# Patient Record
Sex: FEMALE | Race: WHITE | HISPANIC OR LATINO
[De-identification: names, ages, dates, MRNs, and addresses within clinical notes are randomized per-mention and may not be internally consistent; named-entity substitution may affect disease eponyms.]

---

## 2017-09-19 ENCOUNTER — APPOINTMENT (OUTPATIENT)
Dept: BARIATRICS | Facility: CLINIC | Age: 62
End: 2017-09-19
Payer: COMMERCIAL

## 2017-09-19 ENCOUNTER — APPOINTMENT (OUTPATIENT)
Dept: BARIATRICS/WEIGHT MGMT | Facility: CLINIC | Age: 62
End: 2017-09-19
Payer: COMMERCIAL

## 2017-09-19 ENCOUNTER — OUTPATIENT (OUTPATIENT)
Dept: OUTPATIENT SERVICES | Facility: HOSPITAL | Age: 62
LOS: 1 days | End: 2017-09-19
Payer: COMMERCIAL

## 2017-09-19 VITALS
SYSTOLIC BLOOD PRESSURE: 110 MMHG | HEIGHT: 64 IN | WEIGHT: 277 LBS | DIASTOLIC BLOOD PRESSURE: 60 MMHG | BODY MASS INDEX: 47.29 KG/M2

## 2017-09-19 VITALS — BODY MASS INDEX: 47.29 KG/M2 | WEIGHT: 277 LBS | HEIGHT: 64 IN

## 2017-09-19 DIAGNOSIS — Z98.84 BARIATRIC SURGERY STATUS: ICD-10-CM

## 2017-09-19 DIAGNOSIS — R13.10 DYSPHAGIA, UNSPECIFIED: ICD-10-CM

## 2017-09-19 DIAGNOSIS — E66.9 OBESITY, UNSPECIFIED: ICD-10-CM

## 2017-09-19 DIAGNOSIS — M25.562 PAIN IN RIGHT KNEE: ICD-10-CM

## 2017-09-19 DIAGNOSIS — M25.561 PAIN IN RIGHT KNEE: ICD-10-CM

## 2017-09-19 PROCEDURE — 74220 X-RAY XM ESOPHAGUS 1CNTRST: CPT

## 2017-09-19 PROCEDURE — 74220 X-RAY XM ESOPHAGUS 1CNTRST: CPT | Mod: 26

## 2017-09-19 PROCEDURE — 99214 OFFICE O/P EST MOD 30 MIN: CPT

## 2017-09-19 PROCEDURE — 97802 MEDICAL NUTRITION INDIV IN: CPT

## 2017-10-31 ENCOUNTER — APPOINTMENT (OUTPATIENT)
Dept: BARIATRICS/WEIGHT MGMT | Facility: CLINIC | Age: 62
End: 2017-10-31
Payer: COMMERCIAL

## 2017-10-31 VITALS — HEIGHT: 64 IN | WEIGHT: 270 LBS | BODY MASS INDEX: 46.1 KG/M2

## 2017-10-31 PROCEDURE — 97803 MED NUTRITION INDIV SUBSEQ: CPT

## 2018-01-25 ENCOUNTER — APPOINTMENT (OUTPATIENT)
Dept: BARIATRICS/WEIGHT MGMT | Facility: CLINIC | Age: 63
End: 2018-01-25
Payer: COMMERCIAL

## 2018-01-25 DIAGNOSIS — Z98.84 BARIATRIC SURGERY STATUS: ICD-10-CM

## 2018-01-25 PROCEDURE — 97803 MED NUTRITION INDIV SUBSEQ: CPT

## 2018-01-29 VITALS — HEIGHT: 64 IN | WEIGHT: 270 LBS | BODY MASS INDEX: 46.1 KG/M2

## 2018-03-22 ENCOUNTER — APPOINTMENT (OUTPATIENT)
Dept: BARIATRICS/WEIGHT MGMT | Facility: CLINIC | Age: 63
End: 2018-03-22

## 2020-05-27 ENCOUNTER — EMERGENCY (EMERGENCY)
Facility: HOSPITAL | Age: 65
LOS: 0 days | Discharge: HOME | End: 2020-05-27
Attending: EMERGENCY MEDICINE
Payer: COMMERCIAL

## 2020-05-27 VITALS
HEART RATE: 95 BPM | OXYGEN SATURATION: 98 % | DIASTOLIC BLOOD PRESSURE: 93 MMHG | SYSTOLIC BLOOD PRESSURE: 155 MMHG | TEMPERATURE: 99 F | RESPIRATION RATE: 18 BRPM

## 2020-05-27 VITALS — HEIGHT: 64 IN | WEIGHT: 283.96 LBS

## 2020-05-27 DIAGNOSIS — E66.9 OBESITY, UNSPECIFIED: ICD-10-CM

## 2020-05-27 DIAGNOSIS — I10 ESSENTIAL (PRIMARY) HYPERTENSION: ICD-10-CM

## 2020-05-27 DIAGNOSIS — I73.9 PERIPHERAL VASCULAR DISEASE, UNSPECIFIED: ICD-10-CM

## 2020-05-27 DIAGNOSIS — L53.9 ERYTHEMATOUS CONDITION, UNSPECIFIED: ICD-10-CM

## 2020-05-27 DIAGNOSIS — Z98.890 OTHER SPECIFIED POSTPROCEDURAL STATES: ICD-10-CM

## 2020-05-27 DIAGNOSIS — R60.0 LOCALIZED EDEMA: ICD-10-CM

## 2020-05-27 DIAGNOSIS — Z88.6 ALLERGY STATUS TO ANALGESIC AGENT: ICD-10-CM

## 2020-05-27 DIAGNOSIS — J45.909 UNSPECIFIED ASTHMA, UNCOMPLICATED: ICD-10-CM

## 2020-05-27 DIAGNOSIS — M79.661 PAIN IN RIGHT LOWER LEG: ICD-10-CM

## 2020-05-27 DIAGNOSIS — Z90.49 ACQUIRED ABSENCE OF OTHER SPECIFIED PARTS OF DIGESTIVE TRACT: ICD-10-CM

## 2020-05-27 DIAGNOSIS — L03.115 CELLULITIS OF RIGHT LOWER LIMB: ICD-10-CM

## 2020-05-27 DIAGNOSIS — G47.33 OBSTRUCTIVE SLEEP APNEA (ADULT) (PEDIATRIC): ICD-10-CM

## 2020-05-27 LAB
ANION GAP SERPL CALC-SCNC: 3 MMOL/L — LOW (ref 5–17)
APTT BLD: 35.7 SEC — SIGNIFICANT CHANGE UP (ref 27.5–36.3)
BASOPHILS # BLD AUTO: 0.06 K/UL — SIGNIFICANT CHANGE UP (ref 0–0.2)
BASOPHILS NFR BLD AUTO: 0.6 % — SIGNIFICANT CHANGE UP (ref 0–2)
BUN SERPL-MCNC: 10 MG/DL — SIGNIFICANT CHANGE UP (ref 7–23)
CALCIUM SERPL-MCNC: 9.3 MG/DL — SIGNIFICANT CHANGE UP (ref 8.5–10.1)
CHLORIDE SERPL-SCNC: 108 MMOL/L — SIGNIFICANT CHANGE UP (ref 96–108)
CO2 SERPL-SCNC: 29 MMOL/L — SIGNIFICANT CHANGE UP (ref 22–31)
CREAT SERPL-MCNC: 0.57 MG/DL — SIGNIFICANT CHANGE UP (ref 0.5–1.3)
EOSINOPHIL # BLD AUTO: 0.16 K/UL — SIGNIFICANT CHANGE UP (ref 0–0.5)
EOSINOPHIL NFR BLD AUTO: 1.6 % — SIGNIFICANT CHANGE UP (ref 0–6)
GLUCOSE SERPL-MCNC: 89 MG/DL — SIGNIFICANT CHANGE UP (ref 70–99)
HCT VFR BLD CALC: 42.2 % — SIGNIFICANT CHANGE UP (ref 34.5–45)
HGB BLD-MCNC: 13.6 G/DL — SIGNIFICANT CHANGE UP (ref 11.5–15.5)
IMM GRANULOCYTES NFR BLD AUTO: 0.3 % — SIGNIFICANT CHANGE UP (ref 0–1.5)
INR BLD: 1.01 RATIO — SIGNIFICANT CHANGE UP (ref 0.88–1.16)
LYMPHOCYTES # BLD AUTO: 2.58 K/UL — SIGNIFICANT CHANGE UP (ref 1–3.3)
LYMPHOCYTES # BLD AUTO: 25.3 % — SIGNIFICANT CHANGE UP (ref 13–44)
MCHC RBC-ENTMCNC: 30 PG — SIGNIFICANT CHANGE UP (ref 27–34)
MCHC RBC-ENTMCNC: 32.2 GM/DL — SIGNIFICANT CHANGE UP (ref 32–36)
MCV RBC AUTO: 93.2 FL — SIGNIFICANT CHANGE UP (ref 80–100)
MONOCYTES # BLD AUTO: 0.97 K/UL — HIGH (ref 0–0.9)
MONOCYTES NFR BLD AUTO: 9.5 % — SIGNIFICANT CHANGE UP (ref 2–14)
NEUTROPHILS # BLD AUTO: 6.4 K/UL — SIGNIFICANT CHANGE UP (ref 1.8–7.4)
NEUTROPHILS NFR BLD AUTO: 62.7 % — SIGNIFICANT CHANGE UP (ref 43–77)
PLATELET # BLD AUTO: 290 K/UL — SIGNIFICANT CHANGE UP (ref 150–400)
POTASSIUM SERPL-MCNC: 4.1 MMOL/L — SIGNIFICANT CHANGE UP (ref 3.5–5.3)
POTASSIUM SERPL-SCNC: 4.1 MMOL/L — SIGNIFICANT CHANGE UP (ref 3.5–5.3)
PROTHROM AB SERPL-ACNC: 11.2 SEC — SIGNIFICANT CHANGE UP (ref 10–12.9)
RBC # BLD: 4.53 M/UL — SIGNIFICANT CHANGE UP (ref 3.8–5.2)
RBC # FLD: 13.6 % — SIGNIFICANT CHANGE UP (ref 10.3–14.5)
SODIUM SERPL-SCNC: 140 MMOL/L — SIGNIFICANT CHANGE UP (ref 135–145)
WBC # BLD: 10.2 K/UL — SIGNIFICANT CHANGE UP (ref 3.8–10.5)
WBC # FLD AUTO: 10.2 K/UL — SIGNIFICANT CHANGE UP (ref 3.8–10.5)

## 2020-05-27 PROCEDURE — 36415 COLL VENOUS BLD VENIPUNCTURE: CPT

## 2020-05-27 PROCEDURE — 85730 THROMBOPLASTIN TIME PARTIAL: CPT

## 2020-05-27 PROCEDURE — 93970 EXTREMITY STUDY: CPT

## 2020-05-27 PROCEDURE — 99284 EMERGENCY DEPT VISIT MOD MDM: CPT | Mod: 25

## 2020-05-27 PROCEDURE — 85610 PROTHROMBIN TIME: CPT

## 2020-05-27 PROCEDURE — 85025 COMPLETE CBC W/AUTO DIFF WBC: CPT

## 2020-05-27 PROCEDURE — 80048 BASIC METABOLIC PNL TOTAL CA: CPT

## 2020-05-27 PROCEDURE — 93970 EXTREMITY STUDY: CPT | Mod: 26

## 2020-05-27 PROCEDURE — 99284 EMERGENCY DEPT VISIT MOD MDM: CPT

## 2020-05-27 RX ORDER — CEPHALEXIN 500 MG
1 CAPSULE ORAL
Qty: 40 | Refills: 0
Start: 2020-05-27 | End: 2020-06-05

## 2020-05-27 NOTE — ED STATDOCS - CLINICAL SUMMARY MEDICAL DECISION MAKING FREE TEXT BOX
66 y/o female with a PMHx of JOSE, HTN,  asthma, presents to the ED for right leg swelling, redness, and pain. Exam with area of erythema to right posterior leg, +mild TTP, no fluctuance, no crepitus, no streaking, compartments soft. Will obtain US to r/o DVT. If negative will teat for cellulitis. No signs of necrotizing cellulitis.

## 2020-05-27 NOTE — ED STATDOCS - OBJECTIVE STATEMENT
66 y/o female with a PMHx of JOSE, HTN, asthma, presents to the ED for right leg swelling, redness, and pain. Pt states she had these types of symptoms in the past and treated by PMD and it improves. Pt seen by PMD today and sent in to r/o DVT. Denies CP, SOB, fevers, spreading redness. No other complaints at this time. Allergies: Aspirin.

## 2020-05-27 NOTE — ED STATDOCS - PATIENT PORTAL LINK FT
You can access the FollowMyHealth Patient Portal offered by [facility] by registering at the following website: [email or web address]. By joining [employer]’s FollowMyHealth portal, you will also be able to view your health information using other applications (apps) compatible with our system.

## 2020-05-27 NOTE — ED STATDOCS - PMH
Asthma    Back pain    Circulation problem  discoloration bilateral lower legs   peripheral vascular disease  Obesity    Sleep apnea    Umbilical hernia  resolved 5/2012

## 2020-05-27 NOTE — ED ADULT TRIAGE NOTE - CHIEF COMPLAINT QUOTE
Pt. to the ED sent by PCP C/O Bilateral Leg Swelling and Inflammation ( R/O Cellulitis)- Hx. of HTN and Asthma- Pt. denies Fevers/Respiratory Symptoms and or Sick Contacts

## 2020-05-27 NOTE — ED STATDOCS - PROGRESS NOTE DETAILS
66 y/o F with PMh of venous insufficiency, JOSE, HTN, asthma presents with right LE pain & swelling. Evaluated by PCP today, advised to come to ED for evaluate for DVT vs. cellulitis. As per PCP, patient has had multiple instances of cellulitis over the past year, managed with PO antibiotics. Pt states symptoms are now worse than prior instances. Denies trauma, fever, chills, vomiting, CP, SOb, palpitations. PE: Well appearing, Obese. Cardiac: s1s2, RRR> Lungs: CTAB. Abdomen: NBS x4, soft, nontender. Skin: +erythema posterior right thigh which is TTP. No fluctuance, crepitus. MSK: Full ROM in LE joints, patient ambulatory. A/P; Concern for DVT vs. cellulitis. Plan for labs, US, reassess. - Giuliano Andino PA-C Labs and imaging unremarkable. Plan for dc home with keflex. PCP follow up advised. - Giuliano Andino PA-C

## 2020-05-27 NOTE — ED STATDOCS - NS ED ROS FT
Constitutional: No fever or chills  Eyes: No visual changes  HEENT: No throat pain  CV: No chest pain  Resp: No SOB no cough  GI: No abd pain, nausea or vomiting  : No dysuria  MSK: +right leg swelling, redness, and pain  Skin: No rash  Neuro: No headache

## 2020-05-27 NOTE — ED STATDOCS - NSFOLLOWUPINSTRUCTIONS_ED_ALL_ED_FT
TAKE ANTIBIOTICS TO COMPLETION. FOLLOW UP WITH PCP. RETURN TO ED IF SYMPTOMS WORSEN.     Cellulitis    WHAT YOU NEED TO KNOW:    Cellulitis is a skin infection caused by bacteria. Cellulitis may go away on its own or you may need treatment. Your healthcare provider may draw a [place] around the outside edges of your cellulitis. If your cellulitis spreads, your healthcare provider will see it outside of the [place]. Cellulitis          DISCHARGE INSTRUCTIONS:    Call 911 if:     You have sudden trouble breathing or chest pain.        Return to the emergency department if:     Your wound gets larger and more painful.       You feel a crackling under your skin when you touch it.      You have purple dots or bumps on your skin, or you see bleeding under your skin.      You have new swelling and pain in your legs.      The red, warm, swollen area gets larger.      You see red streaks coming from the infected area.    Contact your healthcare provider if:     You have a fever.      Your fever or pain does not go away or gets worse.      The area does not get smaller after 2 days of antibiotics.      Your skin is flaking or peeling off.      You have questions or concerns about your condition or care.    Medicines:     Antibiotics help treat the bacterial infection.       NSAIDs, such as ibuprofen, help decrease swelling, pain, and fever. NSAIDs can cause stomach bleeding or kidney problems in certain people. If you take blood thinner medicine, always ask if NSAIDs are safe for you. Always read the medicine label and follow directions. Do not give these medicines to children under 6 months of age without direction from your child's healthcare provider.      Acetaminophen decreases pain and fever. It is available without a doctor's order. Ask how much to take and how often to take it. Follow directions. Read the labels of all other medicines you are using to see if they also contain acetaminophen, or ask your doctor or pharmacist. Acetaminophen can cause liver damage if not taken correctly. Do not use more than 4 grams (4,000 milligrams) total of acetaminophen in one day.       Take your medicine as directed. Contact your healthcare provider if you think your medicine is not helping or if you have side effects. Tell him or her if you are allergic to any medicine. Keep a list of the medicines, vitamins, and herbs you take. Include the amounts, and when and why you take them. Bring the list or the pill bottles to follow-up visits. Carry your medicine list with you in case of an emergency.    Self-care:     Elevate the area above the level of your heart as often as you can. This will help decrease swelling and pain. Prop the area on pillows or blankets to keep it elevated comfortably.       Clean the area daily until the wound scabs over. Gently wash the area with soap and water. Pat dry. Use dressings as directed.       Place cool or warm, wet cloths on the area as directed. Use clean cloths and clean water. Leave it on the area until the cloth is room temperature. Pat the area dry with a clean, dry cloth. The cloths may help decrease pain.     Prevent cellulitis:     Do not scratch bug bites or areas of injury. You increase your risk for cellulitis by scratching these areas.       Do not share personal items, such as towels, clothing, and razors.       Clean exercise equipment with germ-killing  before and after you use it.      Wash your hands often. Use soap and water. Wash your hands after you use the bathroom, change a child's diapers, or sneeze. Wash your hands before you prepare or eat food. Use lotion to prevent dry, cracked skin. Handwashing           Wear pressure stockings as directed. You may be told to wear the stockings if you have peripheral edema. The stockings improve blood flow and decrease swelling.      Treat athlete’s foot. This can help prevent the spread of a bacterial skin infection.    Follow up with your healthcare provider within 3 days, or as directed: Your healthcare provider will check if your cellulitis is getting better. You may need different medicine. Write down your questions so you remember to ask them during your visits.

## 2020-05-27 NOTE — ED STATDOCS - PHYSICAL EXAMINATION
Constitutional: NAD AAOx3  Eyes: PERRLA EOMI  Head: Normocephalic atraumatic  Mouth: MMM  Cardiac: regular rate   Resp: Lungs CTAB  GI: Abd s/nt/nd  Neuro: CN2-12 intact  Skin: +area of erythema to right posterior leg, +mild TTP, no fluctuance, no crepitus, no streaking, compartments soft.

## 2020-05-27 NOTE — ED STATDOCS - NS_ ATTENDINGSCRIBEDETAILS _ED_A_ED_FT
I, Steven Ramirez MD,  performed the initial face to face bedside interview with this patient regarding history of present illness, review of symptoms and relevant past medical, social and family history.  I completed an independent physical examination.  I was the initial provider who evaluated this patient.  The history, relevant review of systems, past medical and surgical history, medical decision making, and physical examination was documented by the scribe in my presence and I attest to the accuracy of the documentation.

## 2020-05-27 NOTE — ED STATDOCS - PSH
Hernia  umbilical hernia repair 2012  S/P appendectomy  teenager  S/P   ,  S/P tonsillectomy  childhood  Vein symptom  right leg laser vein cleanse and bypass

## 2020-09-29 ENCOUNTER — RESULT REVIEW (OUTPATIENT)
Age: 65
End: 2020-09-29

## 2020-12-23 ENCOUNTER — OUTPATIENT (OUTPATIENT)
Dept: OUTPATIENT SERVICES | Facility: HOSPITAL | Age: 65
LOS: 1 days | End: 2020-12-23
Payer: COMMERCIAL

## 2020-12-23 DIAGNOSIS — Z20.828 CONTACT WITH AND (SUSPECTED) EXPOSURE TO OTHER VIRAL COMMUNICABLE DISEASES: ICD-10-CM

## 2020-12-23 LAB — SARS-COV-2 RNA SPEC QL NAA+PROBE: SIGNIFICANT CHANGE UP

## 2020-12-23 PROCEDURE — C9803: CPT

## 2020-12-23 PROCEDURE — U0003: CPT

## 2020-12-24 DIAGNOSIS — Z20.828 CONTACT WITH AND (SUSPECTED) EXPOSURE TO OTHER VIRAL COMMUNICABLE DISEASES: ICD-10-CM

## 2021-06-25 NOTE — ED ADULT NURSE NOTE - CCCP TRG CHIEF CMPLNT
Miguel Angel would like a call regarding Samantha. He states he is going back on the medication. He states pharmacy does not have it listed in the system. He states he will be admitting to the nursing home on Monday 6/28/2021. Patient is aware that  is out of the office today.   lower leg pain/injury

## 2021-11-03 ENCOUNTER — APPOINTMENT (OUTPATIENT)
Dept: FAMILY MEDICINE | Facility: CLINIC | Age: 66
End: 2021-11-03
Payer: COMMERCIAL

## 2021-11-03 ENCOUNTER — NON-APPOINTMENT (OUTPATIENT)
Age: 66
End: 2021-11-03

## 2021-11-03 VITALS
DIASTOLIC BLOOD PRESSURE: 81 MMHG | HEIGHT: 64 IN | BODY MASS INDEX: 48.49 KG/M2 | SYSTOLIC BLOOD PRESSURE: 147 MMHG | WEIGHT: 284 LBS | TEMPERATURE: 98.4 F | HEART RATE: 99 BPM | OXYGEN SATURATION: 98 %

## 2021-11-03 DIAGNOSIS — Z87.891 PERSONAL HISTORY OF NICOTINE DEPENDENCE: ICD-10-CM

## 2021-11-03 DIAGNOSIS — Z78.9 OTHER SPECIFIED HEALTH STATUS: ICD-10-CM

## 2021-11-03 DIAGNOSIS — Z82.49 FAMILY HISTORY OF ISCHEMIC HEART DISEASE AND OTHER DISEASES OF THE CIRCULATORY SYSTEM: ICD-10-CM

## 2021-11-03 DIAGNOSIS — Z23 ENCOUNTER FOR IMMUNIZATION: ICD-10-CM

## 2021-11-03 DIAGNOSIS — Z01.419 ENCOUNTER FOR GYNECOLOGICAL EXAMINATION (GENERAL) (ROUTINE) W/OUT ABNORMAL FINDINGS: ICD-10-CM

## 2021-11-03 DIAGNOSIS — B37.2 CANDIDIASIS OF SKIN AND NAIL: ICD-10-CM

## 2021-11-03 DIAGNOSIS — G47.33 OBSTRUCTIVE SLEEP APNEA (ADULT) (PEDIATRIC): ICD-10-CM

## 2021-11-03 PROCEDURE — G0008: CPT

## 2021-11-03 PROCEDURE — 90662 IIV NO PRSV INCREASED AG IM: CPT

## 2021-11-03 PROCEDURE — 99205 OFFICE O/P NEW HI 60 MIN: CPT | Mod: 25

## 2021-11-03 RX ORDER — KETOCONAZOLE 20 MG/G
2 CREAM TOPICAL TWICE DAILY
Qty: 1 | Refills: 2 | Status: ACTIVE | COMMUNITY
Start: 2021-11-03 | End: 1900-01-01

## 2021-11-03 RX ORDER — HYDROCHLOROTHIAZIDE 25 MG/1
25 TABLET ORAL
Refills: 0 | Status: ACTIVE | COMMUNITY

## 2021-11-03 RX ORDER — MELOXICAM 15 MG/1
15 TABLET ORAL
Refills: 0 | Status: ACTIVE | COMMUNITY

## 2021-11-03 RX ORDER — LOSARTAN POTASSIUM 100 MG/1
TABLET, FILM COATED ORAL
Refills: 0 | Status: DISCONTINUED | COMMUNITY
End: 2021-11-03

## 2021-11-03 NOTE — PHYSICAL EXAM
[Normal Appearance] : normal in appearance [No Masses] : no palpable masses [No Nipple Discharge] : no nipple discharge [No Axillary Lymphadenopathy] : no axillary lymphadenopathy [Soft] : abdomen soft [Non Tender] : non-tender [No HSM] : no HSM [Urethral Meatus] : normal urethra [Urinary Bladder Findings] : the bladder was normal on palpation [External Female Genitalia] : normal external genitalia [Vagina] : normal vaginal exam [Cervix] : normal cervix [Uterus] : uterus was normal size, without masses or tenderness [Uterine Adnexae] : normal adnexa [FreeTextEntry1] : Pt obese, making bimanual exam very difficult [de-identified] : macular intertriginous rash

## 2021-12-29 ENCOUNTER — APPOINTMENT (OUTPATIENT)
Dept: FAMILY MEDICINE | Facility: CLINIC | Age: 66
End: 2021-12-29

## 2022-01-01 ENCOUNTER — INPATIENT (INPATIENT)
Facility: HOSPITAL | Age: 67
LOS: 2 days | Discharge: HOME | DRG: 603 | End: 2022-01-04
Attending: FAMILY MEDICINE | Admitting: INTERNAL MEDICINE
Payer: COMMERCIAL

## 2022-01-01 VITALS — HEIGHT: 64 IN | WEIGHT: 285.06 LBS

## 2022-01-01 DIAGNOSIS — L03.116 CELLULITIS OF LEFT LOWER LIMB: ICD-10-CM

## 2022-01-01 DIAGNOSIS — Z98.84 BARIATRIC SURGERY STATUS: Chronic | ICD-10-CM

## 2022-01-01 LAB
ALBUMIN SERPL ELPH-MCNC: 3.5 G/DL — SIGNIFICANT CHANGE UP (ref 3.3–5)
ALP SERPL-CCNC: 111 U/L — SIGNIFICANT CHANGE UP (ref 40–120)
ALT FLD-CCNC: 21 U/L — SIGNIFICANT CHANGE UP (ref 12–78)
ANION GAP SERPL CALC-SCNC: 4 MMOL/L — LOW (ref 5–17)
AST SERPL-CCNC: 12 U/L — LOW (ref 15–37)
BASOPHILS # BLD AUTO: 0.05 K/UL — SIGNIFICANT CHANGE UP (ref 0–0.2)
BASOPHILS NFR BLD AUTO: 0.4 % — SIGNIFICANT CHANGE UP (ref 0–2)
BILIRUB SERPL-MCNC: 0.4 MG/DL — SIGNIFICANT CHANGE UP (ref 0.2–1.2)
BUN SERPL-MCNC: 8 MG/DL — SIGNIFICANT CHANGE UP (ref 7–23)
CALCIUM SERPL-MCNC: 9.1 MG/DL — SIGNIFICANT CHANGE UP (ref 8.5–10.1)
CHLORIDE SERPL-SCNC: 106 MMOL/L — SIGNIFICANT CHANGE UP (ref 96–108)
CO2 SERPL-SCNC: 28 MMOL/L — SIGNIFICANT CHANGE UP (ref 22–31)
CREAT SERPL-MCNC: 0.48 MG/DL — LOW (ref 0.5–1.3)
EOSINOPHIL # BLD AUTO: 0.23 K/UL — SIGNIFICANT CHANGE UP (ref 0–0.5)
EOSINOPHIL NFR BLD AUTO: 1.9 % — SIGNIFICANT CHANGE UP (ref 0–6)
GLUCOSE SERPL-MCNC: 89 MG/DL — SIGNIFICANT CHANGE UP (ref 70–99)
HCT VFR BLD CALC: 42.3 % — SIGNIFICANT CHANGE UP (ref 34.5–45)
HGB BLD-MCNC: 13.5 G/DL — SIGNIFICANT CHANGE UP (ref 11.5–15.5)
IMM GRANULOCYTES NFR BLD AUTO: 0.4 % — SIGNIFICANT CHANGE UP (ref 0–1.5)
LYMPHOCYTES # BLD AUTO: 25.1 % — SIGNIFICANT CHANGE UP (ref 13–44)
LYMPHOCYTES # BLD AUTO: 3.02 K/UL — SIGNIFICANT CHANGE UP (ref 1–3.3)
MCHC RBC-ENTMCNC: 29.9 PG — SIGNIFICANT CHANGE UP (ref 27–34)
MCHC RBC-ENTMCNC: 31.9 GM/DL — LOW (ref 32–36)
MCV RBC AUTO: 93.6 FL — SIGNIFICANT CHANGE UP (ref 80–100)
MONOCYTES # BLD AUTO: 0.94 K/UL — HIGH (ref 0–0.9)
MONOCYTES NFR BLD AUTO: 7.8 % — SIGNIFICANT CHANGE UP (ref 2–14)
NEUTROPHILS # BLD AUTO: 7.76 K/UL — HIGH (ref 1.8–7.4)
NEUTROPHILS NFR BLD AUTO: 64.4 % — SIGNIFICANT CHANGE UP (ref 43–77)
NT-PROBNP SERPL-SCNC: 61 PG/ML — SIGNIFICANT CHANGE UP (ref 0–125)
PLATELET # BLD AUTO: 291 K/UL — SIGNIFICANT CHANGE UP (ref 150–400)
POTASSIUM SERPL-MCNC: 3.9 MMOL/L — SIGNIFICANT CHANGE UP (ref 3.5–5.3)
POTASSIUM SERPL-SCNC: 3.9 MMOL/L — SIGNIFICANT CHANGE UP (ref 3.5–5.3)
PROT SERPL-MCNC: 8 GM/DL — SIGNIFICANT CHANGE UP (ref 6–8.3)
RAPID RVP RESULT: SIGNIFICANT CHANGE UP
RBC # BLD: 4.52 M/UL — SIGNIFICANT CHANGE UP (ref 3.8–5.2)
RBC # FLD: 13.4 % — SIGNIFICANT CHANGE UP (ref 10.3–14.5)
SARS-COV-2 RNA SPEC QL NAA+PROBE: SIGNIFICANT CHANGE UP
SODIUM SERPL-SCNC: 138 MMOL/L — SIGNIFICANT CHANGE UP (ref 135–145)
TROPONIN I, HIGH SENSITIVITY RESULT: 7.92 NG/L — SIGNIFICANT CHANGE UP
WBC # BLD: 12.05 K/UL — HIGH (ref 3.8–10.5)
WBC # FLD AUTO: 12.05 K/UL — HIGH (ref 3.8–10.5)

## 2022-01-01 PROCEDURE — 97116 GAIT TRAINING THERAPY: CPT | Mod: GP

## 2022-01-01 PROCEDURE — 83735 ASSAY OF MAGNESIUM: CPT

## 2022-01-01 PROCEDURE — 84100 ASSAY OF PHOSPHORUS: CPT

## 2022-01-01 PROCEDURE — 85652 RBC SED RATE AUTOMATED: CPT

## 2022-01-01 PROCEDURE — 0225U NFCT DS DNA&RNA 21 SARSCOV2: CPT

## 2022-01-01 PROCEDURE — 97163 PT EVAL HIGH COMPLEX 45 MIN: CPT | Mod: GP

## 2022-01-01 PROCEDURE — 93926 LOWER EXTREMITY STUDY: CPT | Mod: 26,LT

## 2022-01-01 PROCEDURE — 99223 1ST HOSP IP/OBS HIGH 75: CPT

## 2022-01-01 PROCEDURE — 85027 COMPLETE CBC AUTOMATED: CPT

## 2022-01-01 PROCEDURE — 80048 BASIC METABOLIC PNL TOTAL CA: CPT

## 2022-01-01 PROCEDURE — 87040 BLOOD CULTURE FOR BACTERIA: CPT

## 2022-01-01 PROCEDURE — 82306 VITAMIN D 25 HYDROXY: CPT

## 2022-01-01 PROCEDURE — 71045 X-RAY EXAM CHEST 1 VIEW: CPT | Mod: 26

## 2022-01-01 PROCEDURE — 86803 HEPATITIS C AB TEST: CPT

## 2022-01-01 PROCEDURE — 99285 EMERGENCY DEPT VISIT HI MDM: CPT

## 2022-01-01 PROCEDURE — 86140 C-REACTIVE PROTEIN: CPT

## 2022-01-01 PROCEDURE — 80061 LIPID PANEL: CPT

## 2022-01-01 PROCEDURE — 36415 COLL VENOUS BLD VENIPUNCTURE: CPT

## 2022-01-01 PROCEDURE — 93010 ELECTROCARDIOGRAM REPORT: CPT

## 2022-01-01 PROCEDURE — 93970 EXTREMITY STUDY: CPT | Mod: 26

## 2022-01-01 PROCEDURE — 93926 LOWER EXTREMITY STUDY: CPT | Mod: LT

## 2022-01-01 RX ORDER — VANCOMYCIN HCL 1 G
1000 VIAL (EA) INTRAVENOUS ONCE
Refills: 0 | Status: DISCONTINUED | OUTPATIENT
Start: 2022-01-01 | End: 2022-01-01

## 2022-01-01 RX ORDER — CEFAZOLIN SODIUM 1 G
1000 VIAL (EA) INJECTION ONCE
Refills: 0 | Status: DISCONTINUED | OUTPATIENT
Start: 2022-01-01 | End: 2022-01-01

## 2022-01-01 RX ORDER — VANCOMYCIN HCL 1 G
2000 VIAL (EA) INTRAVENOUS ONCE
Refills: 0 | Status: COMPLETED | OUTPATIENT
Start: 2022-01-01 | End: 2022-01-01

## 2022-01-01 RX ORDER — CEFAZOLIN SODIUM 1 G
1000 VIAL (EA) INJECTION ONCE
Refills: 0 | Status: COMPLETED | OUTPATIENT
Start: 2022-01-01 | End: 2022-01-01

## 2022-01-01 RX ORDER — ACETAMINOPHEN 500 MG
650 TABLET ORAL ONCE
Refills: 0 | Status: COMPLETED | OUTPATIENT
Start: 2022-01-01 | End: 2022-01-01

## 2022-01-01 RX ADMIN — Medication 650 MILLIGRAM(S): at 19:20

## 2022-01-01 RX ADMIN — Medication 250 MILLIGRAM(S): at 21:00

## 2022-01-01 RX ADMIN — Medication 650 MILLIGRAM(S): at 18:32

## 2022-01-01 RX ADMIN — Medication 1000 MILLIGRAM(S): at 20:02

## 2022-01-01 NOTE — PATIENT PROFILE ADULT - FALL HARM RISK - HARM RISK INTERVENTIONS

## 2022-01-01 NOTE — H&P ADULT - ASSESSMENT
Pt is admitted w/    left leg pain  Cellulitis of left leg  Obesity  - s/p 2gm Vanco in the ED and Ancef,  cont  - ID and podiatry consults  - f/u cx  - DVT proph: lovenox

## 2022-01-01 NOTE — ED PROVIDER NOTE - CLINICAL SUMMARY MEDICAL DECISION MAKING FREE TEXT BOX
pt with hx of sleep apnea, 3 day of changes of extremities, recurrent L leg cellulitis, here with swelling and pain to left leg for 3 weeks. Pt has been treated with unna but is not feeling any better, will rule out DVT, will rule out cellulitis. pt with hx of sleep apnea, 3 day of changes of extremities, recurrent L leg cellulitis, here with swelling and pain to left leg for 3 weeks. Pt has been treated with unna but is not feeling any better, will rule out DVT, will rule out cellulitis. labs, doppler, pt is also c/o sob which is also chronic.

## 2022-01-01 NOTE — H&P ADULT - NSICDXFAMILYHX_GEN_ALL_CORE_FT
FAMILY HISTORY:  Father  Still living? Unknown  FH: type 2 diabetes, Age at diagnosis: Age Unknown    Mother  Still living? Unknown  Family history of hypertension, Age at diagnosis: Age Unknown    Sibling  Still living? Unknown  Family history of hypertension, Age at diagnosis: Age Unknown  FH: type 2 diabetes, Age at diagnosis: Age Unknown

## 2022-01-01 NOTE — PATIENT PROFILE ADULT - VISION (WITH CORRECTIVE LENSES IF THE PATIENT USUALLY WEARS THEM):
wears all time/Normal vision: sees adequately in most situations; can see medication labels, newsprint

## 2022-01-01 NOTE — ED PROVIDER NOTE - OBJECTIVE STATEMENT
65 y/o F with a pmhx of asthma, back pain, chronic venous insuffiencey of LE, obesity, sleep apnea, umbilical hernia presents to the ED c/o LE pain x 3 weeks. Denies fever. Pt reports worsening pain with movement. Pt went to her PMD who dx pt with tendonitis. Pt reports SOB x 1 year and was put on a CPAP. non smoker.   PCP: Dr. Robert  Vascular: Dr. Obando

## 2022-01-01 NOTE — ED PROVIDER NOTE - CONSTITUTIONAL, MLM
Well appearing, obese, white female, awake, alert, oriented to person, place, time/situation and in no apparent distress. normal...

## 2022-01-01 NOTE — ED ADULT NURSE NOTE - NSIMPLEMENTINTERV_GEN_ALL_ED
Implemented All Universal Safety Interventions:  [place] to call system. Call bell, personal items and telephone within reach. Instruct patient to call for assistance. Room bathroom lighting operational. Non-slip footwear when patient is off stretcher. Physically safe environment: no spills, clutter or unnecessary equipment. Stretcher in lowest position, wheels locked, appropriate side rails in place.

## 2022-01-01 NOTE — ED STATDOCS - NSICDXPASTMEDICALHX_GEN_ALL_CORE_FT
PAST MEDICAL HISTORY:  Asthma     Back pain     Circulation problem discoloration bilateral lower legs   peripheral vascular disease    Obesity     Sleep apnea     Umbilical hernia resolved 5/2012

## 2022-01-01 NOTE — ED STATDOCS - NSICDXPASTSURGICALHX_GEN_ALL_CORE_FT
PAST SURGICAL HISTORY:  Hernia umbilical hernia repair 2012    S/P appendectomy teenager    S/P  ,    S/P tonsillectomy childhood    Vein symptom right leg laser vein cleanse and bypass

## 2022-01-01 NOTE — H&P ADULT - NSHPPHYSICALEXAM_GEN_ALL_CORE
ICU Vital Signs Last 24 Hrs  T(C): 36.9 (01 Jan 2022 15:00), Max: 36.9 (01 Jan 2022 15:00)  T(F): 98.4 (01 Jan 2022 15:00), Max: 98.4 (01 Jan 2022 15:00)  HR: 80 (01 Jan 2022 15:00) (80 - 80)  BP: 135/80 (01 Jan 2022 15:00) (135/80 - 135/80)    RR: 20 (01 Jan 2022 15:00) (20 - 20)  SpO2: 100% (01 Jan 2022 15:00) (100% - 100%) ICU Vital Signs Last 24 Hrs  T(C): 36.9 (01 Jan 2022 15:00), Max: 36.9 (01 Jan 2022 15:00)  T(F): 98.4 (01 Jan 2022 15:00), Max: 98.4 (01 Jan 2022 15:00)  HR: 80 (01 Jan 2022 15:00) (80 - 80)  BP: 135/80 (01 Jan 2022 15:00) (135/80 - 135/80)    RR: 20 (01 Jan 2022 15:00) (20 - 20)  SpO2: 100% (01 Jan 2022 15:00) (100% - 100%)  . Consitutional: Obese, well groomed female  · Pharynx	no redness  · Neck	detailed exam   · Neck Details	supple; no JVD  · Breasts	not examined  · Back	No deformity or limitation of movement   · Respiratory	detailed exam  · Respiratory Details	respirations non-labored; clear to auscultation bilaterally  · Cardiovascular	detailed exam  · Cardiovascular Details	no murmur   · Cardiovascular Details	positive S1; positive S2  · Gastrointestinal	detailed exam  · GI Normal	soft; nontender; bowel sounds normal  · Genitourinary	not examined   · Rectal	not examined   · Extremities	detailed exam   · Extremities Details	+1 pedal edema, venous stasis changes with hemosidderin depisition  · Neurological	detailed exam  · Neurological Details	alert and oriented x 3; responds to verbal commands; normal strength  · Skin	detailed exam  · Skin Comments	no acute rash  · Musculoskeletal	detailed exam  · Musculoskeletal Details	R : ROM intact; no calf tenderness; normal strength;  L pain in calf 8/10, erythema, mildly increased warmth compared to L leg  · Psychiatric	normal affect

## 2022-01-01 NOTE — H&P ADULT - NSHPSOCIALHISTORY_GEN_ALL_CORE
Pt denies tob/ETOH/drug use Pt lives with her .  She works as a  in an office.  She quit smoking at age 48 after 4 pack years.  She denies tob/ETOH/drug use

## 2022-01-01 NOTE — H&P ADULT - HISTORY OF PRESENT ILLNESS
Pt is a pleasant  65 y/o Female with a Pmhx of Asthma, Obesity, Sleep apnea on CPAP, Chronic Back pain, Chronic venous insuffiencey of LE,  Umbilical hernia who presented to [place] ED c/o LE pain x 3 weeks.     Denies fever. Pt reports worsening pain with movement. Pt went to her PMD who dx pt with tendonitis.    Pt is a pleasant  65 y/o Female with a Pmhx of Asthma, Obesity Lap Band Surgery 2013, Sleep apnea on CPAP, Chronic Back pain, Chronic venous insuffiencey of LE,  Umbilical hernia who presented to [place] ED c/o LE pain x 3 weeks.     Denies fever. Pt reports worsening pain with movement. Pt went to her PMD who dx pt with tendonitis.    Pt is a pleasant  67 y/o Female with a Pmhx of Asthma, Obesity Lap Band Surgery 2013, Sleep apnea on CPAP, Chronic Back pain, low Vit D,  Chronic venous insuffiencey of LE,  Umbilical hernia who presented to [place] ED c/o increasing  left LE pain x 3 weeks.  Pt reports at worst it was 10/10.    Pt went to her PMD who dx pt with tendonitis.   Her leg was wrapped by her podiatrist Dr. Reyes 2 weeks ago.  She took it off 12/30/21 due to pain.       She denies recent plane travel or hx of blood clots.    Pt denies fever/chills/n/v.  No cp/SOB ,  no resp or urinary complaints,  no abd complints.  Pt reports worsening pain with movement.

## 2022-01-01 NOTE — ED STATDOCS - PROGRESS NOTE DETAILS
Eden Reyna for attending Dr. Izaguirre: 65 y/o female with a PMHx of asthma, back pain, chronic venous insuffiency of LE, obesity, sleep apnea, umbilical hernia presents to the ED c/o LE pain and swelling x21 days. +SOB. No other complaints at this time. Will send pt to main ED for further evaluation.

## 2022-01-01 NOTE — ED PROVIDER NOTE - MUSCULOSKELETAL, MLM
Spine appears normal, range of motion is not limited. +L>R swelling, erythema L>R of LE and tenderness of LLE, pulses and swelling L>R, sensation normal

## 2022-01-02 LAB
ANION GAP SERPL CALC-SCNC: 2 MMOL/L — LOW (ref 5–17)
BUN SERPL-MCNC: 9 MG/DL — SIGNIFICANT CHANGE UP (ref 7–23)
CALCIUM SERPL-MCNC: 9 MG/DL — SIGNIFICANT CHANGE UP (ref 8.5–10.1)
CHLORIDE SERPL-SCNC: 108 MMOL/L — SIGNIFICANT CHANGE UP (ref 96–108)
CHOLEST SERPL-MCNC: 157 MG/DL — SIGNIFICANT CHANGE UP
CO2 SERPL-SCNC: 29 MMOL/L — SIGNIFICANT CHANGE UP (ref 22–31)
CREAT SERPL-MCNC: 0.52 MG/DL — SIGNIFICANT CHANGE UP (ref 0.5–1.3)
ERYTHROCYTE [SEDIMENTATION RATE] IN BLOOD: 23 MM/HR — HIGH (ref 0–20)
GLUCOSE SERPL-MCNC: 91 MG/DL — SIGNIFICANT CHANGE UP (ref 70–99)
HCT VFR BLD CALC: 40.7 % — SIGNIFICANT CHANGE UP (ref 34.5–45)
HCV AB S/CO SERPL IA: 0.18 S/CO — SIGNIFICANT CHANGE UP (ref 0–0.99)
HCV AB SERPL-IMP: SIGNIFICANT CHANGE UP
HDLC SERPL-MCNC: 67 MG/DL — SIGNIFICANT CHANGE UP
HGB BLD-MCNC: 12.9 G/DL — SIGNIFICANT CHANGE UP (ref 11.5–15.5)
LIPID PNL WITH DIRECT LDL SERPL: 76 MG/DL — SIGNIFICANT CHANGE UP
MCHC RBC-ENTMCNC: 29.7 PG — SIGNIFICANT CHANGE UP (ref 27–34)
MCHC RBC-ENTMCNC: 31.7 GM/DL — LOW (ref 32–36)
MCV RBC AUTO: 93.8 FL — SIGNIFICANT CHANGE UP (ref 80–100)
NON HDL CHOLESTEROL: 90 MG/DL — SIGNIFICANT CHANGE UP
PLATELET # BLD AUTO: 266 K/UL — SIGNIFICANT CHANGE UP (ref 150–400)
POTASSIUM SERPL-MCNC: 4.1 MMOL/L — SIGNIFICANT CHANGE UP (ref 3.5–5.3)
POTASSIUM SERPL-SCNC: 4.1 MMOL/L — SIGNIFICANT CHANGE UP (ref 3.5–5.3)
RBC # BLD: 4.34 M/UL — SIGNIFICANT CHANGE UP (ref 3.8–5.2)
RBC # FLD: 13.4 % — SIGNIFICANT CHANGE UP (ref 10.3–14.5)
SODIUM SERPL-SCNC: 139 MMOL/L — SIGNIFICANT CHANGE UP (ref 135–145)
TRIGL SERPL-MCNC: 70 MG/DL — SIGNIFICANT CHANGE UP
WBC # BLD: 11.25 K/UL — HIGH (ref 3.8–10.5)
WBC # FLD AUTO: 11.25 K/UL — HIGH (ref 3.8–10.5)

## 2022-01-02 PROCEDURE — 99497 ADVNCD CARE PLAN 30 MIN: CPT | Mod: 25

## 2022-01-02 PROCEDURE — 99232 SBSQ HOSP IP/OBS MODERATE 35: CPT

## 2022-01-02 RX ORDER — CEFAZOLIN SODIUM 1 G
2000 VIAL (EA) INJECTION EVERY 8 HOURS
Refills: 0 | Status: DISCONTINUED | OUTPATIENT
Start: 2022-01-02 | End: 2022-01-04

## 2022-01-02 RX ORDER — HYDROCHLOROTHIAZIDE 25 MG
25 TABLET ORAL DAILY
Refills: 0 | Status: DISCONTINUED | OUTPATIENT
Start: 2022-01-02 | End: 2022-01-04

## 2022-01-02 RX ORDER — CEFAZOLIN SODIUM 1 G
1000 VIAL (EA) INJECTION EVERY 8 HOURS
Refills: 0 | Status: DISCONTINUED | OUTPATIENT
Start: 2022-01-02 | End: 2022-01-02

## 2022-01-02 RX ORDER — ENOXAPARIN SODIUM 100 MG/ML
40 INJECTION SUBCUTANEOUS EVERY 12 HOURS
Refills: 0 | Status: DISCONTINUED | OUTPATIENT
Start: 2022-01-02 | End: 2022-01-04

## 2022-01-02 RX ORDER — CHOLECALCIFEROL (VITAMIN D3) 125 MCG
6000 CAPSULE ORAL DAILY
Refills: 0 | Status: DISCONTINUED | OUTPATIENT
Start: 2022-01-02 | End: 2022-01-04

## 2022-01-02 RX ORDER — CEFAZOLIN SODIUM 1 G
2000 VIAL (EA) INJECTION EVERY 8 HOURS
Refills: 0 | Status: DISCONTINUED | OUTPATIENT
Start: 2022-01-02 | End: 2022-01-02

## 2022-01-02 RX ORDER — CELECOXIB 200 MG/1
200 CAPSULE ORAL DAILY
Refills: 0 | Status: DISCONTINUED | OUTPATIENT
Start: 2022-01-02 | End: 2022-01-04

## 2022-01-02 RX ORDER — VANCOMYCIN HCL 1 G
2000 VIAL (EA) INTRAVENOUS EVERY 12 HOURS
Refills: 0 | Status: DISCONTINUED | OUTPATIENT
Start: 2022-01-02 | End: 2022-01-02

## 2022-01-02 RX ADMIN — Medication 100 MILLIGRAM(S): at 22:37

## 2022-01-02 RX ADMIN — CELECOXIB 200 MILLIGRAM(S): 200 CAPSULE ORAL at 09:24

## 2022-01-02 RX ADMIN — ENOXAPARIN SODIUM 40 MILLIGRAM(S): 100 INJECTION SUBCUTANEOUS at 22:37

## 2022-01-02 RX ADMIN — Medication 6000 UNIT(S): at 09:25

## 2022-01-02 RX ADMIN — Medication 1 TABLET(S): at 09:24

## 2022-01-02 RX ADMIN — Medication 25 MILLIGRAM(S): at 09:25

## 2022-01-02 RX ADMIN — Medication 100 MILLIGRAM(S): at 14:29

## 2022-01-02 RX ADMIN — Medication 1000 MILLIGRAM(S): at 06:36

## 2022-01-02 RX ADMIN — ENOXAPARIN SODIUM 40 MILLIGRAM(S): 100 INJECTION SUBCUTANEOUS at 08:37

## 2022-01-02 RX ADMIN — CELECOXIB 200 MILLIGRAM(S): 200 CAPSULE ORAL at 10:15

## 2022-01-02 NOTE — CONSULT NOTE ADULT - ASSESSMENT
65 y/o Female with a Pmhx of Asthma, Obesity Lap Band Surgery 2013, Sleep apnea on CPAP, Chronic Back pain, low Vit D,  Chronic venous insuffiencey of LE,  Umbilical hernia who presented to [place] ED c/o increasing  left LE pain x 3 weeks. Pt reports at worst it was 10/10. Pt went to her PMD who dx pt with tendonitis.   Her leg was wrapped by her podiatrist Dr. Reyes 2 weeks ago.  She took it off 12/30/21 due to pain. She denies recent plane travel or hx of blood clots. Pt denies fever/chills/n/v.  No cp/SOB, no resp or urinary complaints, no abd complaints. Pt reports worsening pain with movement. Here wbc ct 12, was given IV vancomycin/ancef.     1. LLE cellulitis. venous stasis. morbid obesity  - imaging reviewed  - stop vancomycin  - improving  - increase ancef 2gmq8h  - continue with abx coverage  - f/u cultures  - if improved by am, dc with po keflex [id number] 7 day course  - fu cbc  - supportive care    2. other issues - care per medicine

## 2022-01-03 LAB
HCT VFR BLD CALC: 43.7 % — SIGNIFICANT CHANGE UP (ref 34.5–45)
HGB BLD-MCNC: 13.9 G/DL — SIGNIFICANT CHANGE UP (ref 11.5–15.5)
MCHC RBC-ENTMCNC: 29.9 PG — SIGNIFICANT CHANGE UP (ref 27–34)
MCHC RBC-ENTMCNC: 31.8 GM/DL — LOW (ref 32–36)
MCV RBC AUTO: 94 FL — SIGNIFICANT CHANGE UP (ref 80–100)
PLATELET # BLD AUTO: 289 K/UL — SIGNIFICANT CHANGE UP (ref 150–400)
RBC # BLD: 4.65 M/UL — SIGNIFICANT CHANGE UP (ref 3.8–5.2)
RBC # FLD: 13.5 % — SIGNIFICANT CHANGE UP (ref 10.3–14.5)
WBC # BLD: 10.48 K/UL — SIGNIFICANT CHANGE UP (ref 3.8–10.5)
WBC # FLD AUTO: 10.48 K/UL — SIGNIFICANT CHANGE UP (ref 3.8–10.5)

## 2022-01-03 PROCEDURE — 99232 SBSQ HOSP IP/OBS MODERATE 35: CPT

## 2022-01-03 RX ORDER — ACETAMINOPHEN 500 MG
650 TABLET ORAL EVERY 6 HOURS
Refills: 0 | Status: DISCONTINUED | OUTPATIENT
Start: 2022-01-03 | End: 2022-01-04

## 2022-01-03 RX ORDER — FUROSEMIDE 40 MG
20 TABLET ORAL ONCE
Refills: 0 | Status: COMPLETED | OUTPATIENT
Start: 2022-01-03 | End: 2022-01-03

## 2022-01-03 RX ADMIN — Medication 20 MILLIGRAM(S): at 13:59

## 2022-01-03 RX ADMIN — ENOXAPARIN SODIUM 40 MILLIGRAM(S): 100 INJECTION SUBCUTANEOUS at 22:59

## 2022-01-03 RX ADMIN — CELECOXIB 200 MILLIGRAM(S): 200 CAPSULE ORAL at 09:03

## 2022-01-03 RX ADMIN — Medication 100 MILLIGRAM(S): at 14:00

## 2022-01-03 RX ADMIN — ENOXAPARIN SODIUM 40 MILLIGRAM(S): 100 INJECTION SUBCUTANEOUS at 09:04

## 2022-01-03 RX ADMIN — Medication 25 MILLIGRAM(S): at 09:03

## 2022-01-03 RX ADMIN — Medication 100 MILLIGRAM(S): at 06:13

## 2022-01-03 RX ADMIN — Medication 650 MILLIGRAM(S): at 12:52

## 2022-01-03 RX ADMIN — Medication 100 MILLIGRAM(S): at 22:58

## 2022-01-03 RX ADMIN — Medication 1 TABLET(S): at 09:03

## 2022-01-03 RX ADMIN — Medication 650 MILLIGRAM(S): at 12:22

## 2022-01-03 RX ADMIN — CELECOXIB 200 MILLIGRAM(S): 200 CAPSULE ORAL at 09:33

## 2022-01-03 RX ADMIN — Medication 6000 UNIT(S): at 09:04

## 2022-01-03 NOTE — DIETITIAN INITIAL EVALUATION ADULT. - PERTINENT MEDS FT
MEDICATIONS  (STANDING):  ceFAZolin   IVPB 2000 milliGRAM(s) IV Intermittent every 8 hours  celecoxib 200 milliGRAM(s) Oral daily  cholecalciferol 6000 Unit(s) Oral daily  enoxaparin Injectable 40 milliGRAM(s) SubCutaneous every 12 hours  hydrochlorothiazide 25 milliGRAM(s) Oral daily  multivitamin 1 Tablet(s) Oral daily    MEDICATIONS  (PRN):

## 2022-01-03 NOTE — PHYSICAL THERAPY INITIAL EVALUATION ADULT - LIVES WITH, PROFILE
Pt lives at home with  and son at home with [address] ranch home; bedroom is on ground floor; Pt also has 9 steps down to the basement

## 2022-01-03 NOTE — DIETITIAN INITIAL EVALUATION ADULT. - PERTINENT LABORATORY DATA
01-02    139  |  108  |  9   ----------------------------<  91  4.1   |  29  |  0.52    Ca    9.0      02 Jan 2022 06:30    TPro  8.0  /  Alb  3.5  /  TBili  0.4  /  DBili  x   /  AST  12<L>  /  ALT  21  /  AlkPhos  111  01-01

## 2022-01-03 NOTE — CONSULT NOTE ADULT - SUBJECTIVE AND OBJECTIVE BOX
Patient is a 66y old  Female who presents with a chief complaint of left leg pain  Cellulitis of left leg  Obesity (2022 12:33)    ________________________________  ATika SOLIMAN is a 66y year old Female with a past medical history of  Asthma, Obesity Lap Band Surgery , Sleep apnea on CPAP, Chronic Back pain, low Vit D,  Chronic venous insuffiencey of LE,  Umbilical hernia who presented to [place] ED c/o increasing  left LE pain x 3 weeks.  Pt reports at worst it was 10/10.    Pt went to her PMD who dx pt with tendonitis.   Her leg was wrapped by her podiatrist Dr. Reyes 2 weeks ago.  She took it off 21 due to pain.       She denies recent plane travel or hx of blood clots.    Pt denies fever/chills/n/v.  No cp/SOB ,  no resp or urinary complaints,  no abd complints.  Pt reports worsening pain with movement.      ________________________________  Review of systems: A 10 point review of system has been performed, and is negative except for what has been mentioned in the above history of present illness.     PAST MEDICAL & SURGICAL HISTORY:  Asthma    Circulation problem  discoloration bilateral lower legs   peripheral vascular disease    Umbilical hernia  resolved 2012    Obesity    Back pain    Sleep apnea    Hernia  umbilical hernia repair 2012    S/P   ,    S/P tonsillectomy  childhood    S/P appendectomy  teenager    Vein symptom  right leg laser vein cleanse and bypass     LAP-BAND surgery status      FAMILY HISTORY:  FH: type 2 diabetes (Father, Sibling)    Family history of hypertension (Sibling, Mother)       The patient denies any history of premature CAD or sudden cardiac death.    SOCIAL HISTORY: The patient denies any history of tobacco abuse, alcohol abuse or illicit drug use.    ALLERGIES:  aspirin (Unknown)    Home Medications:  Aqueous Vitamin D: 1 application orally once a week (2022 00:27)  hydroCHLOROthiazide 25 mg oral tablet: 1 tab(s) orally once a day (2022 00:27)  meloxicam 15 mg oral tablet: 1 tab(s) orally once a day (2022 00:27)    MEDICATIONS  (STANDING):  ceFAZolin   IVPB 2000 milliGRAM(s) IV Intermittent every 8 hours  celecoxib 200 milliGRAM(s) Oral daily  cholecalciferol 6000 Unit(s) Oral daily  enoxaparin Injectable 40 milliGRAM(s) SubCutaneous every 12 hours  hydrochlorothiazide 25 milliGRAM(s) Oral daily  multivitamin 1 Tablet(s) Oral daily    MEDICATIONS  (PRN):  acetaminophen     Tablet .. 650 milliGRAM(s) Oral every 6 hours PRN Temp greater or equal to 38C (100.4F), Mild Pain (1 - 3)    Vital Signs Last 24 Hrs  T(C): 37.2 (2022 17:10), Max: 37.3 (2022 08:20)  T(F): 99 (2022 17:10), Max: 99.1 (2022 08:20)  HR: 71 (2022 17:10) (71 - 87)  BP: 123/69 (2022 17:10) (123/69 - 147/82)  BP(mean): --  RR: 18 (2022 17:10) (17 - 19)  SpO2: 100% (2022 17:10) (98% - 100%)  I&O's Summary    2022 07:01  -  2022 07:00  --------------------------------------------------------  IN: 600 mL / OUT: 0 mL / NET: 600 mL    2022 07:01  -  2022 17:32  --------------------------------------------------------  IN: 410 mL / OUT: 875 mL / NET: -465 mL      ________________________________  GENERAL APPEARANCE:  No acute distress  HEAD: normocephalic, atraumatic  NECK: supple, no jugular venous distention, no carotid bruit    HEART: Regular rate and rhythm, S1, S2 normal, 1/6 murmur    CHEST:  No anterior chest wall tenderness    LUNGS:  Clear to auscultation, without any wheezing, rhonchi or rales    ABDOMEN: soft, nontender, nondistended, with positive bowel sounds appreciated  EXTREMITIES: BL LE edema  NEURO: Alert and oriented x3  PSYC:  Normal affect  SKIN:  Dry  ________________________________   ECG: Sinus Rhythm     LABS:                        13.9   10.48 )-----------( 289      ( 2022 10:18 )             43.7                 139  |  108  |  9   ----------------------------<  91  4.1   |  29  |  0.52    Ca    9.0      2022 06:30    TPro  8.0  /  Alb  3.5  /  TBili  0.4  /  DBili  x   /  AST  12<L>  /  ALT  21  /  AlkPhos  111        Lipid Panel  Chl 157  HDL 67  LDL --  Trg 70  LIVER FUNCTIONS - ( 2022 18:06 )  Alb: 3.5 g/dL / Pro: 8.0 gm/dL / ALK PHOS: 111 U/L / ALT: 21 U/L / AST: 12 U/L / GGT: x             Pro BNP  61  @ 18:06  D Dimer  --  @ 18:06               ________________________________    RADIOLOGY & ADDITIONAL STUDIES:   ________________________________    ASSESSMENT:  Cellulitis  Peripheral arterial disease-no evidence of limb ischemia  Obesity  Sleep apnea    Plan: Antibiotics per primary.  Regarding PAD, outpatient follow-up.  Full note to follow.    __________________________________________________________________________  Thank you for allowing me to participate in the care of your patient. Please contact me should any questions arise.    AVNI Tariq DO, [facility]  [phone]
Patient is a 66y old  Female who presents with a chief complaint of left leg pain  Cellulitis of left leg  Obesity (01 Jan 2022 20:32)    HPI:  Pt is a pleasant  65 y/o Female with a Pmhx of Asthma, Obesity Lap Band Surgery 2013, Sleep apnea on CPAP, Chronic Back pain, low Vit D,  Chronic venous insuffiencey of LE,  Umbilical hernia who presented to [place] ED c/o increasing  left LE pain x 3 weeks. Pt reports at worst it was 10/10. Pt went to her PMD who dx pt with tendonitis.   Her leg was wrapped by her podiatrist Dr. Reyes 2 weeks ago.  She took it off 12/30/21 due to pain. She denies recent plane travel or hx of blood clots. Pt denies fever/chills/n/v.  No cp/SOB, no resp or urinary complaints, no abd complaints. Pt reports worsening pain with movement. Here wbc ct 12, was given IV vancomycin/ancef.       PMH: as above  PSH: as above  Meds: per reconciliation sheet, noted below  MEDICATIONS  (STANDING):  ceFAZolin  Injectable. 2000 milliGRAM(s) IV Push every 8 hours  celecoxib 200 milliGRAM(s) Oral daily  cholecalciferol 6000 Unit(s) Oral daily  enoxaparin Injectable 40 milliGRAM(s) SubCutaneous every 12 hours  hydrochlorothiazide 25 milliGRAM(s) Oral daily  multivitamin 1 Tablet(s) Oral daily    MEDICATIONS  (PRN):    Allergies    aspirin (Unknown)    Intolerances      Social: no smoking, no alcohol, no illegal drugs; no recent travel, no exposure to TB  FAMILY HISTORY:  FH: type 2 diabetes (Father, Sibling)    Family history of hypertension (Sibling, Mother)       no history of premature cardiovascular disease in first degree relatives    ROS: the patient denies fever, no chills, no HA, no dizziness, no sore throat, no blurry vision, no CP, no palpitations, no SOB, no cough, no abdominal pain, no diarrhea, no N/V, no dysuria, no leg pain, no claudication, no rash, no joint aches, no rectal pain or bleeding, no night sweats    All other systems reviewed and are negative    Vital Signs Last 24 Hrs  T(C): 36.8 (02 Jan 2022 09:18), Max: 37.1 (02 Jan 2022 01:13)  T(F): 98.3 (02 Jan 2022 09:18), Max: 98.8 (02 Jan 2022 01:13)  HR: 90 (02 Jan 2022 09:18) (68 - 90)  BP: 136/80 (02 Jan 2022 09:18) (135/80 - 148/82)  BP(mean): --  RR: 20 (02 Jan 2022 09:18) (20 - 20)  SpO2: 99% (02 Jan 2022 09:18) (99% - 100%)  Daily Height in cm: 162.56 (01 Jan 2022 14:58)    Daily     PE:  Constitutional: NAD   HEENT: NC/AT, EOMI, PERRLA, conjunctivae clear; ears and nose atraumatic; pharynx benign  Neck: supple; thyroid not palpable  Back: no tenderness  Respiratory: respiratory effort normal; clear to auscultation  Cardiovascular: S1S2 regular, no murmurs  Abdomen: soft, not tender, not distended, positive BS; liver and spleen WNL  Genitourinary: no suprapubic tenderness  Lymphatic: no LN palpable  Musculoskeletal: no muscle tenderness, no joint swelling or tenderness  Extremities: no pedal edema, LLE resolving erythema, warmth   Neurological/ Psychiatric: AxOx3, Judgement and insight normal;  moving all extremities  Skin: no rashes; no palpable lesions    Labs: all available labs reviewed                        12.9 11.25 )-----------( 266      ( 02 Jan 2022 06:30 )             40.7     01-02    139  |  108  |  9   ----------------------------<  91  4.1   |  29  |  0.52    Ca    9.0      02 Jan 2022 06:30    TPro  8.0  /  Alb  3.5  /  TBili  0.4  /  DBili  x   /  AST  12<L>  /  ALT  21  /  AlkPhos  111  01-01     LIVER FUNCTIONS - ( 01 Jan 2022 18:06 )  Alb: 3.5 g/dL / Pro: 8.0 gm/dL / ALK PHOS: 111 U/L / ALT: 21 U/L / AST: 12 U/L / GGT: x               Radiology: all available radiological tests reviewed  ACC: [id number] EXAM:  US DPLX LWR EXT ARTS LTD LT                          PROCEDURE DATE:  01/01/2022          INTERPRETATION:  EXAM:US LOWER EXTREMITY ARTERIAL DUPLEX LIMITED LEFT.     CLINICAL INDICATION: Left leg pain.     TECHNIQUE: Scanning ofthe left lower extremity arteries with duplex   Doppler.     PRIOR EXAM: None.     FINDINGS:     CFA: Triphasic flow. Peak systolic velocity 141 cm/s.  Proximal SFA: Triphasic flow. Peak systolic velocity 129 cm/s.  Mid SFA: Triphasic flow. Peak systolic velocity 106 cm/s.  Distal SFA: Triphasic flow. Peak systolic velocity 117 cm/s.  Popliteal artery: Monophasic flow. Peak systolic velocity 77 cm/s.  Mid PTA: Monophasic flow. Peak systolic velocity 45 cm/s.  Peroneal artery: Monophasic flow. Peak systolic velocity 51 cm/s.  NICOLE: Monophasic flow. Peak systolic velocity 86 cm/s.  DPA: Monophasic flow. Peak systolic velocity 42 cm/s.      IMPRESSION:    Moderate degree of peripheral arterial disease involving the popliteal   artery and the calfarteries. No sonographic evidence of a significant   stenosis. If needed CT angiography can be obtained for further evaluation.      Advanced directives addressed: full resuscitation

## 2022-01-03 NOTE — PHYSICAL THERAPY INITIAL EVALUATION ADULT - ADDITIONAL COMMENTS
Pt was ambulating Independently with SAC in the community PTA; Pt admits to have fallen 2 times this past year due to impaired balance

## 2022-01-03 NOTE — DIETITIAN INITIAL EVALUATION ADULT. - OTHER INFO
Pt is a pleasant  65 y/o Female with a Pmhx of Asthma, Obesity Lap Band Surgery 2013, Sleep apnea on CPAP, Chronic Back pain, low Vit D,  Chronic venous insuffiencey of LE,  Umbilical hernia who presented to [place] ED c/o increasing  left LE pain x 3 weeks.  Pt reports at worst it was 10/10.    Pt went to her PMD who dx pt with tendonitis.   Her leg was wrapped by her podiatrist Dr. Reyes 2 weeks ago.  She took it off 12/30/21 due to pain.     Pt seen for assessment 2/2 to h/x of bariatric surgery. Pt had bariatric surgery back in 2013 and pt lost about 30lbs, but gained back some of weight. Pt was discussing with s/x about possible sleeve or bypass, but hasn't been able to receive surgery due to CPAP (per patient). Pt at this time is very unsure and RD provided support and encouraged pt to discuss with Md when she feels comfortable. Pt denies c/s difficulty and pt denies n/v/d/c. Pt overall reports no changes in intake or diet and doesn't feel she is a very big eater. RD discussed diet and weight loss and provided pt with contact information and resources. RD will continue to monitor and support patient as needed.

## 2022-01-04 ENCOUNTER — TRANSCRIPTION ENCOUNTER (OUTPATIENT)
Age: 67
End: 2022-01-04

## 2022-01-04 VITALS
DIASTOLIC BLOOD PRESSURE: 68 MMHG | HEART RATE: 98 BPM | RESPIRATION RATE: 20 BRPM | TEMPERATURE: 98 F | SYSTOLIC BLOOD PRESSURE: 120 MMHG | OXYGEN SATURATION: 100 %

## 2022-01-04 LAB
24R-OH-CALCIDIOL SERPL-MCNC: 12.4 NG/ML — LOW (ref 30–80)
ANION GAP SERPL CALC-SCNC: 5 MMOL/L — SIGNIFICANT CHANGE UP (ref 5–17)
BUN SERPL-MCNC: 13 MG/DL — SIGNIFICANT CHANGE UP (ref 7–23)
CALCIUM SERPL-MCNC: 9.6 MG/DL — SIGNIFICANT CHANGE UP (ref 8.5–10.1)
CHLORIDE SERPL-SCNC: 101 MMOL/L — SIGNIFICANT CHANGE UP (ref 96–108)
CO2 SERPL-SCNC: 31 MMOL/L — SIGNIFICANT CHANGE UP (ref 22–31)
CREAT SERPL-MCNC: 0.8 MG/DL — SIGNIFICANT CHANGE UP (ref 0.5–1.3)
GLUCOSE SERPL-MCNC: 122 MG/DL — HIGH (ref 70–99)
HCT VFR BLD CALC: 47.8 % — HIGH (ref 34.5–45)
HGB BLD-MCNC: 15.2 G/DL — SIGNIFICANT CHANGE UP (ref 11.5–15.5)
MAGNESIUM SERPL-MCNC: 2.1 MG/DL — SIGNIFICANT CHANGE UP (ref 1.6–2.6)
MCHC RBC-ENTMCNC: 29.8 PG — SIGNIFICANT CHANGE UP (ref 27–34)
MCHC RBC-ENTMCNC: 31.8 GM/DL — LOW (ref 32–36)
MCV RBC AUTO: 93.7 FL — SIGNIFICANT CHANGE UP (ref 80–100)
PHOSPHATE SERPL-MCNC: 3.4 MG/DL — SIGNIFICANT CHANGE UP (ref 2.5–4.5)
PLATELET # BLD AUTO: 323 K/UL — SIGNIFICANT CHANGE UP (ref 150–400)
POTASSIUM SERPL-MCNC: 3.4 MMOL/L — LOW (ref 3.5–5.3)
POTASSIUM SERPL-SCNC: 3.4 MMOL/L — LOW (ref 3.5–5.3)
RBC # BLD: 5.1 M/UL — SIGNIFICANT CHANGE UP (ref 3.8–5.2)
RBC # FLD: 13.3 % — SIGNIFICANT CHANGE UP (ref 10.3–14.5)
SODIUM SERPL-SCNC: 137 MMOL/L — SIGNIFICANT CHANGE UP (ref 135–145)
WBC # BLD: 10.34 K/UL — SIGNIFICANT CHANGE UP (ref 3.8–10.5)
WBC # FLD AUTO: 10.34 K/UL — SIGNIFICANT CHANGE UP (ref 3.8–10.5)

## 2022-01-04 PROCEDURE — 99239 HOSP IP/OBS DSCHRG MGMT >30: CPT

## 2022-01-04 RX ORDER — POTASSIUM CHLORIDE 20 MEQ
40 PACKET (EA) ORAL ONCE
Refills: 0 | Status: COMPLETED | OUTPATIENT
Start: 2022-01-04 | End: 2022-01-04

## 2022-01-04 RX ORDER — CEPHALEXIN 500 MG
1 CAPSULE ORAL
Qty: 16 | Refills: 0
Start: 2022-01-04 | End: 2022-01-07

## 2022-01-04 RX ORDER — LACTOBACILLUS ACIDOPHILUS 100MM CELL
1 CAPSULE ORAL
Qty: 14 | Refills: 0
Start: 2022-01-04 | End: 2022-01-10

## 2022-01-04 RX ADMIN — Medication 100 MILLIGRAM(S): at 06:29

## 2022-01-04 RX ADMIN — Medication 1 TABLET(S): at 09:11

## 2022-01-04 RX ADMIN — Medication 40 MILLIEQUIVALENT(S): at 12:18

## 2022-01-04 RX ADMIN — ENOXAPARIN SODIUM 40 MILLIGRAM(S): 100 INJECTION SUBCUTANEOUS at 09:12

## 2022-01-04 RX ADMIN — CELECOXIB 200 MILLIGRAM(S): 200 CAPSULE ORAL at 09:11

## 2022-01-04 RX ADMIN — Medication 6000 UNIT(S): at 09:12

## 2022-01-04 RX ADMIN — Medication 100 MILLIGRAM(S): at 13:11

## 2022-01-04 RX ADMIN — CELECOXIB 200 MILLIGRAM(S): 200 CAPSULE ORAL at 09:41

## 2022-01-04 RX ADMIN — Medication 25 MILLIGRAM(S): at 09:11

## 2022-01-04 NOTE — PROGRESS NOTE ADULT - REASON FOR ADMISSION
left leg pain  Cellulitis of left leg  Obesity

## 2022-01-04 NOTE — DISCHARGE NOTE NURSING/CASE MANAGEMENT/SOCIAL WORK - NSDCPEFALRISK_GEN_ALL_CORE
For information on Fall & Injury Prevention, visit: [email or web address] OR  [email or web address] OR  https://www.cdc.gov/steadi/patient.html

## 2022-01-04 NOTE — PROGRESS NOTE ADULT - SUBJECTIVE AND OBJECTIVE BOX
1/2: left leg cellulitis improving  no new complaints      PHYSICAL EXAM:    Daily Height in cm: 162.56 (01 Jan 2022 14:58)    Daily     Vital Signs Last 24 Hrs  T(C): 36.8 (02 Jan 2022 09:18), Max: 37.1 (02 Jan 2022 01:13)  T(F): 98.3 (02 Jan 2022 09:18), Max: 98.8 (02 Jan 2022 01:13)  HR: 90 (02 Jan 2022 09:18) (68 - 90)  BP: 136/80 (02 Jan 2022 09:18) (135/80 - 148/82)  BP(mean): --  RR: 20 (02 Jan 2022 09:18) (20 - 20)  SpO2: 99% (02 Jan 2022 09:18) (99% - 100%)    Constitutional: Weak and ill appearing  HEENT: Atraumatic, GUNNER,   Respiratory: Breath Sounds normal, no rhonchi/wheeze  Cardiovascular: N S1S2;   Gastrointestinal: Abdomen soft, non tender, Bowel Sounds present  Extremities: No edema, peripheral pulses present  Neurological: AAO x 3, no gross focal motor deficits  Skin: left leg cellulitic, b/l leg chronic venous statsis  Lymph Nodes: No lymphadenopathy noted  Back: No CVA tenderness   Musculoskeletal: non tender  Breasts: Deferred  Genitourinary: deferred  Rectal: Deferred    All Labs/EKG/Radiology/Meds reviewed by me                          12.9   11.25 )-----------( 266      ( 02 Jan 2022 06:30 )             40.7       CBC Full  -  ( 02 Jan 2022 06:30 )  WBC Count : 11.25 K/uL  RBC Count : 4.34 M/uL  Hemoglobin : 12.9 g/dL  Hematocrit : 40.7 %  Platelet Count - Automated : 266 K/uL  Mean Cell Volume : 93.8 fl  Mean Cell Hemoglobin : 29.7 pg  Mean Cell Hemoglobin Concentration : 31.7 gm/dL  Auto Neutrophil # : x  Auto Lymphocyte # : x  Auto Monocyte # : x  Auto Eosinophil # : x  Auto Basophil # : x  Auto Neutrophil % : x  Auto Lymphocyte % : x  Auto Monocyte % : x  Auto Eosinophil % : x  Auto Basophil % : x      01-02    139  |  108  |  9   ----------------------------<  91  4.1   |  29  |  0.52    Ca    9.0      02 Jan 2022 06:30    TPro  8.0  /  Alb  3.5  /  TBili  0.4  /  DBili  x   /  AST  12<L>  /  ALT  21  /  AlkPhos  111  01-01      LIVER FUNCTIONS - ( 01 Jan 2022 18:06 )  Alb: 3.5 g/dL / Pro: 8.0 gm/dL / ALK PHOS: 111 U/L / ALT: 21 U/L / AST: 12 U/L / GGT: x               < from: US Duplex Venous Lower Ext Complete, Bilateral (01.01.22 @ 17:00) >  No evidence of deep venous thrombosis in either lower extremity.    < end of copied text >  < from: US Duplex Arterial Lower Ext Ltd, Left (01.01.22 @ 19:06) >  Moderate degree of peripheral arterial disease involving the popliteal   artery and the calfarteries. No sonographic evidence of a significant   stenosis. If needed CT angiography can be obtained for further evaluation.    < end of copied text >                  MEDICATIONS  (STANDING):  ceFAZolin   IVPB 2000 milliGRAM(s) IV Intermittent every 8 hours  celecoxib 200 milliGRAM(s) Oral daily  cholecalciferol 6000 Unit(s) Oral daily  enoxaparin Injectable 40 milliGRAM(s) SubCutaneous every 12 hours  hydrochlorothiazide 25 milliGRAM(s) Oral daily  multivitamin 1 Tablet(s) Oral daily    MEDICATIONS  (PRN):    
HOSPITALIST PROGRESS NOTE:  SUBJECTIVE:  PCP:  Chief Complaint: Patient is a 66y old  Female who presents with a chief complaint of left leg pain  Cellulitis of left leg  Obesity (03 Jan 2022 10:59)      HPI:  Pt is a pleasant  67 y/o Female with a Pmhx of Asthma, Obesity Lap Band Surgery 2013, Sleep apnea on CPAP, Chronic Back pain, low Vit D,  Chronic venous insuffiencey of LE,  Umbilical hernia who presented to [place] ED c/o increasing  left LE pain x 3 weeks.  Pt reports at worst it was 10/10.    Pt went to her PMD who dx pt with tendonitis.   Her leg was wrapped by her podiatrist Dr. Reyes 2 weeks ago.  She took it off 12/30/21 due to pain.   She denies recent plane travel or hx of blood clots.    Pt denies fever/chills/n/v.  No cp/SOB ,  no resp or urinary complaints,  no abd complints.  Pt reports worsening pain with movement. (01 Jan 2022 20:32)    1/3: Less redness but does have swelling in legs B/L; +Pain in LLE 4/10      Allergies:  aspirin (Unknown)    REVIEW OF SYSTEMS:  See HPI. All other review of systems is negative unless indicated above.     OBJECTIVE  Physical Exam:  Vital Signs:    Vital Signs Last 24 Hrs  T(C): 37.3 (03 Jan 2022 08:20), Max: 37.3 (03 Jan 2022 08:20)  T(F): 99.1 (03 Jan 2022 08:20), Max: 99.1 (03 Jan 2022 08:20)  HR: 87 (03 Jan 2022 08:20) (73 - 87)  BP: 143/78 (03 Jan 2022 08:20) (130/70 - 147/82)  BP(mean): 86 (02 Jan 2022 16:33) (86 - 86)  RR: 17 (03 Jan 2022 08:20) (17 - 20)  SpO2: 98% (03 Jan 2022 08:20) (98% - 100%)  I&O's Summary    02 Jan 2022 07:01  -  03 Jan 2022 07:00  --------------------------------------------------------  IN: 600 mL / OUT: 0 mL / NET: 600 mL    03 Jan 2022 07:01  -  03 Jan 2022 12:34  --------------------------------------------------------  IN: 240 mL / OUT: 0 mL / NET: 240 mL      Constitutional: NAD, awake   Neurological: AAO x 3, no focal deficits  HEENT: PERRLA, EOMI, MMM  Neck: Soft and supple, No LAD, No JVD  Respiratory: Breath sounds are clear bilaterally, No wheezing, rales or rhonchi  Cardiovascular: S1 and S2, regular rate and rhythm; no Murmurs, gallops or rubs  Gastrointestinal: Bowel Sounds present, soft, nontender, nondistended, no guarding, no rebound tenderness  Back: No CVA tenderness   Extremities: + peripheral edema B/: Left le less redness   Vascular: 2+ peripheral pulses  Musculoskeletal: 5/5 strength b/l upper and lower extremities  Skin: No rashes  Breast: Deferred  Rectal: Deferred    MEDICATIONS  (STANDING):  ceFAZolin   IVPB 2000 milliGRAM(s) IV Intermittent every 8 hours  celecoxib 200 milliGRAM(s) Oral daily  cholecalciferol 6000 Unit(s) Oral daily  enoxaparin Injectable 40 milliGRAM(s) SubCutaneous every 12 hours  furosemide   Injectable 20 milliGRAM(s) IV Push once  hydrochlorothiazide 25 milliGRAM(s) Oral daily  multivitamin 1 Tablet(s) Oral daily      LABS: All Labs Reviewed:                        13.9   10.48 )-----------( 289      ( 03 Jan 2022 10:18 )             43.7     01-02    139  |  108  |  9   ----------------------------<  91  4.1   |  29  |  0.52    Ca    9.0      02 Jan 2022 06:30    TPro  8.0  /  Alb  3.5  /  TBili  0.4  /  DBili  x   /  AST  12<L>  /  ALT  21  /  AlkPhos  111  01-01      Blood Culture:   01-02 @ 06:30  Organism --  Gram Stain Blood -- Gram Stain --  Specimen Source .Blood None  Culture-Blood --        RADIOLOGY/EKG:    < from: Xray Chest 1 View- PORTABLE-Urgent (Xray Chest 1 View- PORTABLE-Urgent .) (01.01.22 @ 16:20) >    IMPRESSION:  Mild cardiomegaly and mild pulmonary venous congestion.    Recommend correlation with physical findings at the left base for further   evaluation of possible infiltrate/atelectasis/effusion    < end of copied text >  < from: US Duplex Arterial Lower Ext Ltd, Left (01.01.22 @ 19:06) >      IMPRESSION:    Moderate degree of peripheral arterial disease involving the popliteal   artery and the calfarteries. No sonographic evidence of a significant   stenosis. If needed CT angiography can be obtained for further evaluation.    < end of copied text >  < from: US Duplex Venous Lower Ext Complete, Bilateral (01.01.22 @ 17:00) >    IMPRESSION:  No evidence of deep venous thrombosis in either lower extremity.      < end of copied text >            
HOSPITALIST PROGRESS NOTE:  SUBJECTIVE:  PCP:  Chief Complaint: Patient is a 66y old  Female who presents with a chief complaint of left leg pain  Cellulitis of left leg  Obesity (03 Jan 2022 10:59)      HPI:  Pt is a pleasant  67 y/o Female with a Pmhx of Asthma, Obesity Lap Band Surgery 2013, Sleep apnea on CPAP, Chronic Back pain, low Vit D,  Chronic venous insuffiencey of LE,  Umbilical hernia who presented to [place] ED c/o increasing  left LE pain x 3 weeks.  Pt reports at worst it was 10/10.    Pt went to her PMD who dx pt with tendonitis.   Her leg was wrapped by her podiatrist Dr. Reyes 2 weeks ago.  She took it off 12/30/21 due to pain.   She denies recent plane travel or hx of blood clots.    Pt denies fever/chills/n/v.  No cp/SOB ,  no resp or urinary complaints,  no abd complints.  Pt reports worsening pain with movement. (01 Jan 2022 20:32)    1/3: Less redness but does have swelling in legs B/L; +Pain in LLE 4/10  1/4:  No overnight events; seent by PT; Less pain and redness of LLE      Allergies:  aspirin (Unknown)    REVIEW OF SYSTEMS:  See HPI. All other review of systems is negative unless indicated above.     OBJECTIVE  Physical Exam:  Vital Signs Last 24 Hrs  T(C): 37.6 (04 Jan 2022 08:28), Max: 37.6 (04 Jan 2022 08:28)  T(F): 99.6 (04 Jan 2022 08:28), Max: 99.6 (04 Jan 2022 08:28)  HR: 87 (04 Jan 2022 08:28) (71 - 88)  BP: 127/76 (04 Jan 2022 08:28) (123/69 - 134/75)  BP(mean): --  RR: 20 (04 Jan 2022 08:28) (18 - 20)  SpO2: 98% (04 Jan 2022 08:28) (97% - 100%)      Constitutional: NAD, awake   Neurological: AAO x 3, no focal deficits  HEENT: PERRLA, EOMI, MMM  Neck: Soft and supple, No LAD, No JVD  Respiratory: Breath sounds are clear bilaterally, No wheezing, rales or rhonchi  Cardiovascular: S1 and S2, regular rate and rhythm; no Murmurs, gallops or rubs  Gastrointestinal: Bowel Sounds present, soft, nontender, nondistended, no guarding, no rebound tenderness  Back: No CVA tenderness   Extremities: + peripheral edema B/L Left le less redness   Vascular: 2+ peripheral pulses  Musculoskeletal: 5/5 strength b/l upper and lower extremities  Skin: No rashes  Breast: Deferred  Rectal: Deferred    MEDICATIONS  (STANDING):  ceFAZolin   IVPB 2000 milliGRAM(s) IV Intermittent every 8 hours  celecoxib 200 milliGRAM(s) Oral daily  cholecalciferol 6000 Unit(s) Oral daily  enoxaparin Injectable 40 milliGRAM(s) SubCutaneous every 12 hours  furosemide   Injectable 20 milliGRAM(s) IV Push once  hydrochlorothiazide 25 milliGRAM(s) Oral daily  multivitamin 1 Tablet(s) Oral daily      LABS: All Labs Reviewed:                        13.9   10.48 )-----------( 289      ( 03 Jan 2022 10:18 )             43.7     01-02    139  |  108  |  9   ----------------------------<  91  4.1   |  29  |  0.52    Ca    9.0      02 Jan 2022 06:30    TPro  8.0  /  Alb  3.5  /  TBili  0.4  /  DBili  x   /  AST  12<L>  /  ALT  21  /  AlkPhos  111  01-01      Blood Culture:   01-02 @ 06:30  Organism --  Gram Stain Blood -- Gram Stain --  Specimen Source .Blood None  Culture-Blood --        RADIOLOGY/EKG:    < from: Xray Chest 1 View- PORTABLE-Urgent (Xray Chest 1 View- PORTABLE-Urgent .) (01.01.22 @ 16:20) >    IMPRESSION:  Mild cardiomegaly and mild pulmonary venous congestion.    Recommend correlation with physical findings at the left base for further   evaluation of possible infiltrate/atelectasis/effusion    < end of copied text >  < from: US Duplex Arterial Lower Ext Ltd, Left (01.01.22 @ 19:06) >      IMPRESSION:    Moderate degree of peripheral arterial disease involving the popliteal   artery and the calfarteries. No sonographic evidence of a significant   stenosis. If needed CT angiography can be obtained for further evaluation.    < end of copied text >  < from: US Duplex Venous Lower Ext Complete, Bilateral (01.01.22 @ 17:00) >    IMPRESSION:  No evidence of deep venous thrombosis in either lower extremity.      < end of copied text >

## 2022-01-04 NOTE — PROGRESS NOTE ADULT - ASSESSMENT
left leg pain and swelling 2ndry to Cellulitis - improving   PAD  Obesity  - Venous Doppler Neg for DVT  - Arterial Doppler Moderate degree of peripheral arterial disease involving the popliteal artery and the calfarteries. No sonographic evidence of a significant stenosis.  - s/p 2gm Vanco in the ED   -continue ancef  2 G q 8hrs and dc with po keflex [id number] X 4 more days   -ID consult appreciated   -cardio vascular consult with Dr. Silva requested for PAD  - f/u cx  -Tylenol   -PT evaluation  -IV lasix 20mg IV X 1 today    #DVT ppx: lovenox    Code status FULL CODE. 
left leg pain and swelling 2ndry to Cellulitis - improving   PAD  Obesity  - Venous Doppler Neg for DVT  - Arterial Doppler Moderate degree of peripheral arterial disease involving the popliteal artery and the calfarteries. No sonographic evidence of a significant stenosis.  - s/p 2gm Vanco in the ED   -continue ancef  2 G q 8hrs and dc with po keflex [id number] 7 day course  -ID consult appreciated   -cardio vascular consult with Dr. Silva requested for PAD  - f/u cx  -Tylenol   -PT evaluation  -IV lasix 20mg IV X 1 today    #DVT ppx: lovenox    Code status FULL CODE. 
Pt is admitted w/    left leg pain  Cellulitis of left leg  PAD  Obesity  - s/p 2gm Vanco in the ED and Ancef,    vanco stopped as per ID  ancef increased to 2 G q 8hrs  - ID and podiatry consults  cardio vascular consult with Dr. Silva requested for PAD  - f/u cx  - DVT proph: lovenox    poc discussed with pt, team, Dr. Chandni GALVAN and advance care planning meeting done with the patient. She wishes to be fully resuscitated and mechanically ventilated if need arises. There are no limitations of any sort in her medical care and management. She is FULL CODE. Additional time spent 17 min.

## 2022-01-04 NOTE — DISCHARGE NOTE NURSING/CASE MANAGEMENT/SOCIAL WORK - PATIENT PORTAL LINK FT
You can access the FollowMyHealth Patient Portal offered by [facility] by registering at the following website: [email or web address]. By joining [facility]’s FollowMyHealth portal, you will also be able to view your health information using other applications (apps) compatible with our system.

## 2022-01-04 NOTE — DISCHARGE NOTE PROVIDER - NSDCCPCAREPLAN_GEN_ALL_CORE_FT
PRINCIPAL DISCHARGE DIAGNOSIS  Diagnosis: Left leg cellulitis  Assessment and Plan of Treatment: left leg pain and swelling 2ndry to Cellulitis - improving   PAD  Obesity  - Venous Doppler Neg for DVT  - Arterial Doppler Moderate degree of peripheral arterial disease involving the popliteal artery and the calfarteries. No sonographic evidence of a significant stenosis.  -S/p ancef  2 G q 8hrs and dc with po keflex [id number] X 4 more days   -FU with PCP  -FUwith cardio vascular Dr. Silva

## 2022-01-04 NOTE — DISCHARGE NOTE PROVIDER - HOSPITAL COURSE
HOSPITALIST PROGRESS NOTE:  SUBJECTIVE:  PCP:  Chief Complaint: Patient is a 66y old  Female who presents with a chief complaint of left leg pain  Cellulitis of left leg  Obesity (03 Jan 2022 10:59)      HPI:  Pt is a pleasant  65 y/o Female with a Pmhx of Asthma, Obesity Lap Band Surgery 2013, Sleep apnea on CPAP, Chronic Back pain, low Vit D,  Chronic venous insuffiencey of LE,  Umbilical hernia who presented to [place] ED c/o increasing  left LE pain x 3 weeks.  Pt reports at worst it was 10/10.    Pt went to her PMD who dx pt with tendonitis.   Her leg was wrapped by her podiatrist Dr. Reyes 2 weeks ago.  She took it off 12/30/21 due to pain.   She denies recent plane travel or hx of blood clots.    Pt denies fever/chills/n/v.  No cp/SOB ,  no resp or urinary complaints,  no abd complints.  Pt reports worsening pain with movement. (01 Jan 2022 20:32)    1/3: Less redness but does have swelling in legs B/L; +Pain in LLE 4/10  1/4:  No overnight events; seent by PT; Less pain and redness of LLE      left leg pain and swelling 2ndry to Cellulitis - improving   PAD  Obesity  - Venous Doppler Neg for DVT  - Arterial Doppler Moderate degree of peripheral arterial disease involving the popliteal artery and the calfarteries. No sonographic evidence of a significant stenosis.  - s/p 2gm Vanco in the ED   -continue ancef  2 G q 8hrs and dc with po keflex [id number] X 4 more days   -ID consult appreciated   -cardio vascular consult with Dr. Silva requested for PAD  - f/u cx  -Tylenol   -PT evaluation  -IV lasix 20mg IV X 1 today    #DVT ppx: lovenox    Code status FULL CODE.

## 2022-01-04 NOTE — DISCHARGE NOTE PROVIDER - NSDCMRMEDTOKEN_GEN_ALL_CORE_FT
Aqueous Vitamin D: 1 application orally once a week  cephalexin 500 mg oral capsule: 1 cap(s) orally every 6 hours   hydroCHLOROthiazide 25 mg oral tablet: 1 tab(s) orally once a day  lactobacillus acidophilus oral capsule: 1 cap(s) orally 2 times a day   meloxicam 15 mg oral tablet: 1 tab(s) orally once a day

## 2022-01-04 NOTE — DISCHARGE NOTE PROVIDER - CARE PROVIDER_API CALL
Lizbeth Silva)  Cardiology; Interventional Cardiology  [address] [place] [address]  Phone: [phone]  Fax: [phone]  Follow Up Time:     Dar Robert)  Family Medicine  [address] [address]  Phone: [phone]  Fax: [phone]  Follow Up Time:

## 2022-01-05 ENCOUNTER — NON-APPOINTMENT (OUTPATIENT)
Age: 67
End: 2022-01-05

## 2022-01-05 NOTE — INPATIENT CERTIFICATION FOR MEDICARE PATIENTS - PHYSICIAN CONCUR
I concur with the Admission Order and I certify that services are provided in accordance with Section 42 CFR § 412.3
ADMIT

## 2022-01-10 DIAGNOSIS — I87.2 VENOUS INSUFFICIENCY (CHRONIC) (PERIPHERAL): ICD-10-CM

## 2022-01-10 DIAGNOSIS — M54.50 LOW BACK PAIN, UNSPECIFIED: ICD-10-CM

## 2022-01-10 DIAGNOSIS — Z87.891 PERSONAL HISTORY OF NICOTINE DEPENDENCE: ICD-10-CM

## 2022-01-10 DIAGNOSIS — G47.30 SLEEP APNEA, UNSPECIFIED: ICD-10-CM

## 2022-01-10 DIAGNOSIS — Z98.84 BARIATRIC SURGERY STATUS: ICD-10-CM

## 2022-01-10 DIAGNOSIS — E66.01 MORBID (SEVERE) OBESITY DUE TO EXCESS CALORIES: ICD-10-CM

## 2022-01-10 DIAGNOSIS — Z88.8 ALLERGY STATUS TO OTHER DRUGS, MEDICAMENTS AND BIOLOGICAL SUBSTANCES STATUS: ICD-10-CM

## 2022-01-10 DIAGNOSIS — J45.909 UNSPECIFIED ASTHMA, UNCOMPLICATED: ICD-10-CM

## 2022-01-10 DIAGNOSIS — I73.9 PERIPHERAL VASCULAR DISEASE, UNSPECIFIED: ICD-10-CM

## 2022-01-10 DIAGNOSIS — L03.116 CELLULITIS OF LEFT LOWER LIMB: ICD-10-CM

## 2022-01-10 DIAGNOSIS — G89.29 OTHER CHRONIC PAIN: ICD-10-CM

## 2022-01-11 ENCOUNTER — APPOINTMENT (OUTPATIENT)
Dept: FAMILY MEDICINE | Facility: CLINIC | Age: 67
End: 2022-01-11
Payer: COMMERCIAL

## 2022-01-11 VITALS
OXYGEN SATURATION: 99 % | WEIGHT: 275 LBS | HEIGHT: 64 IN | BODY MASS INDEX: 46.95 KG/M2 | HEART RATE: 87 BPM | SYSTOLIC BLOOD PRESSURE: 147 MMHG | DIASTOLIC BLOOD PRESSURE: 81 MMHG | TEMPERATURE: 98.7 F

## 2022-01-11 DIAGNOSIS — I87.2 VENOUS INSUFFICIENCY (CHRONIC) (PERIPHERAL): ICD-10-CM

## 2022-01-11 DIAGNOSIS — I73.9 PERIPHERAL VASCULAR DISEASE, UNSPECIFIED: ICD-10-CM

## 2022-01-11 DIAGNOSIS — J45.909 UNSPECIFIED ASTHMA, UNCOMPLICATED: ICD-10-CM

## 2022-01-11 DIAGNOSIS — E66.9 OBESITY, UNSPECIFIED: ICD-10-CM

## 2022-01-11 DIAGNOSIS — I10 ESSENTIAL (PRIMARY) HYPERTENSION: ICD-10-CM

## 2022-01-11 PROCEDURE — 99496 TRANSJ CARE MGMT HIGH F2F 7D: CPT

## 2022-01-11 NOTE — PLAN
[FreeTextEntry1] : Rx given for Jobst Stockings\par Leg elevation\par Follow up with vascular surgery vs possible lymphedema specialist\par \par F/u 1 month

## 2022-01-11 NOTE — PHYSICAL EXAM
[No Varicosities] : no varicosities [No Extremity Clubbing/Cyanosis] : no extremity clubbing/cyanosis [Normal] : no CVA or spinal tenderness [de-identified] : minimal edema [de-identified] : stasis dermatitis to both legs [59008 - High Complexity requires an extensive number of possible diagnoses and/or the management options, extensive complexity of the medical data (tests, etc.) to be reviewed, and a high risk of significant complications, morbidity, and/or mortality as w] : High Complexity

## 2022-01-11 NOTE — HISTORY OF PRESENT ILLNESS
[Post-hospitalization from ___ Hospital] : Post-hospitalization from [unfilled] Hospital [Admitted on: ___] : The patient was admitted on [unfilled] [Discharged on ___] : discharged on [unfilled] [Discharge Summary] : discharge summary [Discharge Med List] : discharge medication list [FreeTextEntry2] : Admitted for pain, redness and swelling to the leg. Diagnosed with Cellulitis. Given IV antibiotics. Then discharded to home on Keflex.  Since discharge has improved. Now with no more heat and redness, now it is more brownish discoloration

## 2022-02-08 ENCOUNTER — APPOINTMENT (OUTPATIENT)
Dept: FAMILY MEDICINE | Facility: CLINIC | Age: 67
End: 2022-02-08

## 2022-03-07 NOTE — ED STATDOCS - TEMPLATE, MLM
Anesthesia Post Evaluation    Patient: Jeff Guardado    Procedure(s) Performed: Procedure(s) (LRB):  REPAIR, HERNIA, PARASTOMAL, LAPAROSCOPIC, ERAS low (N/A)  LYSIS, ADHESIONS, LAPAROSCOPIC    Final Anesthesia Type: general      Patient location during evaluation: PACU  Patient participation: Yes- Able to Participate  Level of consciousness: awake and alert  Post-procedure vital signs: reviewed and stable  Pain management: adequate  Airway patency: patent    PONV status at discharge: No PONV  Anesthetic complications: no      Cardiovascular status: hemodynamically stable  Respiratory status: spontaneous ventilation  Follow-up not needed.          Vitals Value Taken Time   /91 03/07/22 1357   Temp 36.3 °C (97.3 °F) 03/07/22 1305   Pulse 90 03/07/22 1357   Resp 11 03/07/22 1357   SpO2 93 % 03/07/22 1357   Vitals shown include unvalidated device data.      No case tracking events are documented in the log.      Pain/Heriberto Score: Pain Rating Prior to Med Admin: 10 (3/7/2022  1:49 PM)  Heriberto Score: 10 (3/7/2022  1:15 PM)        
Abdominal pain

## 2022-10-13 ENCOUNTER — EMERGENCY (EMERGENCY)
Facility: HOSPITAL | Age: 67
LOS: 0 days | Discharge: HOME | End: 2022-10-14
Attending: EMERGENCY MEDICINE
Payer: COMMERCIAL

## 2022-10-13 VITALS — HEIGHT: 64 IN | WEIGHT: 283.07 LBS

## 2022-10-13 DIAGNOSIS — K80.50 CALCULUS OF BILE DUCT WITHOUT CHOLANGITIS OR CHOLECYSTITIS WITHOUT OBSTRUCTION: ICD-10-CM

## 2022-10-13 DIAGNOSIS — G47.30 SLEEP APNEA, UNSPECIFIED: ICD-10-CM

## 2022-10-13 DIAGNOSIS — Z88.6 ALLERGY STATUS TO ANALGESIC AGENT: ICD-10-CM

## 2022-10-13 DIAGNOSIS — R35.0 FREQUENCY OF MICTURITION: ICD-10-CM

## 2022-10-13 DIAGNOSIS — I87.2 VENOUS INSUFFICIENCY (CHRONIC) (PERIPHERAL): ICD-10-CM

## 2022-10-13 DIAGNOSIS — M47.9 SPONDYLOSIS, UNSPECIFIED: ICD-10-CM

## 2022-10-13 DIAGNOSIS — I10 ESSENTIAL (PRIMARY) HYPERTENSION: ICD-10-CM

## 2022-10-13 DIAGNOSIS — Q79.0 CONGENITAL DIAPHRAGMATIC HERNIA: ICD-10-CM

## 2022-10-13 DIAGNOSIS — Z98.84 BARIATRIC SURGERY STATUS: ICD-10-CM

## 2022-10-13 DIAGNOSIS — N39.0 URINARY TRACT INFECTION, SITE NOT SPECIFIED: ICD-10-CM

## 2022-10-13 DIAGNOSIS — I73.9 PERIPHERAL VASCULAR DISEASE, UNSPECIFIED: ICD-10-CM

## 2022-10-13 DIAGNOSIS — Z98.84 BARIATRIC SURGERY STATUS: Chronic | ICD-10-CM

## 2022-10-13 DIAGNOSIS — E66.9 OBESITY, UNSPECIFIED: ICD-10-CM

## 2022-10-13 DIAGNOSIS — M54.9 DORSALGIA, UNSPECIFIED: ICD-10-CM

## 2022-10-13 DIAGNOSIS — Z90.49 ACQUIRED ABSENCE OF OTHER SPECIFIED PARTS OF DIGESTIVE TRACT: ICD-10-CM

## 2022-10-13 DIAGNOSIS — Z20.822 CONTACT WITH AND (SUSPECTED) EXPOSURE TO COVID-19: ICD-10-CM

## 2022-10-13 DIAGNOSIS — G89.29 OTHER CHRONIC PAIN: ICD-10-CM

## 2022-10-13 DIAGNOSIS — Z90.89 ACQUIRED ABSENCE OF OTHER ORGANS: ICD-10-CM

## 2022-10-13 DIAGNOSIS — R10.11 RIGHT UPPER QUADRANT PAIN: ICD-10-CM

## 2022-10-13 DIAGNOSIS — J45.909 UNSPECIFIED ASTHMA, UNCOMPLICATED: ICD-10-CM

## 2022-10-13 DIAGNOSIS — Z98.890 OTHER SPECIFIED POSTPROCEDURAL STATES: ICD-10-CM

## 2022-10-13 DIAGNOSIS — Z99.89 DEPENDENCE ON OTHER ENABLING MACHINES AND DEVICES: ICD-10-CM

## 2022-10-13 DIAGNOSIS — R10.31 RIGHT LOWER QUADRANT PAIN: ICD-10-CM

## 2022-10-13 LAB
ALBUMIN SERPL ELPH-MCNC: 3.7 G/DL — SIGNIFICANT CHANGE UP (ref 3.3–5)
ALP SERPL-CCNC: 122 U/L — HIGH (ref 40–120)
ALT FLD-CCNC: 19 U/L — SIGNIFICANT CHANGE UP (ref 12–78)
ANION GAP SERPL CALC-SCNC: 4 MMOL/L — LOW (ref 5–17)
APPEARANCE UR: CLEAR — SIGNIFICANT CHANGE UP
AST SERPL-CCNC: 16 U/L — SIGNIFICANT CHANGE UP (ref 15–37)
BASOPHILS # BLD AUTO: 0.04 K/UL — SIGNIFICANT CHANGE UP (ref 0–0.2)
BASOPHILS NFR BLD AUTO: 0.4 % — SIGNIFICANT CHANGE UP (ref 0–2)
BILIRUB SERPL-MCNC: 0.3 MG/DL — SIGNIFICANT CHANGE UP (ref 0.2–1.2)
BILIRUB UR-MCNC: NEGATIVE — SIGNIFICANT CHANGE UP
BUN SERPL-MCNC: 8 MG/DL — SIGNIFICANT CHANGE UP (ref 7–23)
CALCIUM SERPL-MCNC: 9.5 MG/DL — SIGNIFICANT CHANGE UP (ref 8.5–10.1)
CHLORIDE SERPL-SCNC: 106 MMOL/L — SIGNIFICANT CHANGE UP (ref 96–108)
CO2 SERPL-SCNC: 29 MMOL/L — SIGNIFICANT CHANGE UP (ref 22–31)
COLOR SPEC: YELLOW — SIGNIFICANT CHANGE UP
CREAT SERPL-MCNC: 0.51 MG/DL — SIGNIFICANT CHANGE UP (ref 0.5–1.3)
DIFF PNL FLD: NEGATIVE — SIGNIFICANT CHANGE UP
EGFR: 102 ML/MIN/1.73M2 — SIGNIFICANT CHANGE UP
EOSINOPHIL # BLD AUTO: 0.21 K/UL — SIGNIFICANT CHANGE UP (ref 0–0.5)
EOSINOPHIL NFR BLD AUTO: 2.1 % — SIGNIFICANT CHANGE UP (ref 0–6)
GLUCOSE SERPL-MCNC: 98 MG/DL — SIGNIFICANT CHANGE UP (ref 70–99)
GLUCOSE UR QL: NEGATIVE — SIGNIFICANT CHANGE UP
HCT VFR BLD CALC: 41.3 % — SIGNIFICANT CHANGE UP (ref 34.5–45)
HGB BLD-MCNC: 13.6 G/DL — SIGNIFICANT CHANGE UP (ref 11.5–15.5)
IMM GRANULOCYTES NFR BLD AUTO: 0.4 % — SIGNIFICANT CHANGE UP (ref 0–0.9)
KETONES UR-MCNC: NEGATIVE — SIGNIFICANT CHANGE UP
LEUKOCYTE ESTERASE UR-ACNC: ABNORMAL
LIDOCAIN IGE QN: 123 U/L — SIGNIFICANT CHANGE UP (ref 73–393)
LYMPHOCYTES # BLD AUTO: 2.09 K/UL — SIGNIFICANT CHANGE UP (ref 1–3.3)
LYMPHOCYTES # BLD AUTO: 20.4 % — SIGNIFICANT CHANGE UP (ref 13–44)
MCHC RBC-ENTMCNC: 30.4 PG — SIGNIFICANT CHANGE UP (ref 27–34)
MCHC RBC-ENTMCNC: 32.9 GM/DL — SIGNIFICANT CHANGE UP (ref 32–36)
MCV RBC AUTO: 92.2 FL — SIGNIFICANT CHANGE UP (ref 80–100)
MONOCYTES # BLD AUTO: 0.86 K/UL — SIGNIFICANT CHANGE UP (ref 0–0.9)
MONOCYTES NFR BLD AUTO: 8.4 % — SIGNIFICANT CHANGE UP (ref 2–14)
NEUTROPHILS # BLD AUTO: 6.99 K/UL — SIGNIFICANT CHANGE UP (ref 1.8–7.4)
NEUTROPHILS NFR BLD AUTO: 68.3 % — SIGNIFICANT CHANGE UP (ref 43–77)
NITRITE UR-MCNC: NEGATIVE — SIGNIFICANT CHANGE UP
PH UR: 8 — SIGNIFICANT CHANGE UP (ref 5–8)
PLATELET # BLD AUTO: 300 K/UL — SIGNIFICANT CHANGE UP (ref 150–400)
POTASSIUM SERPL-MCNC: 4.3 MMOL/L — SIGNIFICANT CHANGE UP (ref 3.5–5.3)
POTASSIUM SERPL-SCNC: 4.3 MMOL/L — SIGNIFICANT CHANGE UP (ref 3.5–5.3)
PROT SERPL-MCNC: 8.2 GM/DL — SIGNIFICANT CHANGE UP (ref 6–8.3)
PROT UR-MCNC: NEGATIVE — SIGNIFICANT CHANGE UP
RBC # BLD: 4.48 M/UL — SIGNIFICANT CHANGE UP (ref 3.8–5.2)
RBC # FLD: 13.5 % — SIGNIFICANT CHANGE UP (ref 10.3–14.5)
SODIUM SERPL-SCNC: 139 MMOL/L — SIGNIFICANT CHANGE UP (ref 135–145)
SP GR SPEC: 1.01 — SIGNIFICANT CHANGE UP (ref 1.01–1.02)
UROBILINOGEN FLD QL: NEGATIVE — SIGNIFICANT CHANGE UP
WBC # BLD: 10.23 K/UL — SIGNIFICANT CHANGE UP (ref 3.8–10.5)
WBC # FLD AUTO: 10.23 K/UL — SIGNIFICANT CHANGE UP (ref 3.8–10.5)

## 2022-10-13 PROCEDURE — 36415 COLL VENOUS BLD VENIPUNCTURE: CPT

## 2022-10-13 PROCEDURE — 83690 ASSAY OF LIPASE: CPT

## 2022-10-13 PROCEDURE — G1004: CPT

## 2022-10-13 PROCEDURE — 74177 CT ABD & PELVIS W/CONTRAST: CPT | Mod: MG

## 2022-10-13 PROCEDURE — U0003: CPT

## 2022-10-13 PROCEDURE — U0005: CPT

## 2022-10-13 PROCEDURE — 74177 CT ABD & PELVIS W/CONTRAST: CPT | Mod: 26,MG

## 2022-10-13 PROCEDURE — 80053 COMPREHEN METABOLIC PANEL: CPT

## 2022-10-13 PROCEDURE — 99285 EMERGENCY DEPT VISIT HI MDM: CPT

## 2022-10-13 PROCEDURE — 76705 ECHO EXAM OF ABDOMEN: CPT

## 2022-10-13 PROCEDURE — 99284 EMERGENCY DEPT VISIT MOD MDM: CPT | Mod: 25

## 2022-10-13 PROCEDURE — 81001 URINALYSIS AUTO W/SCOPE: CPT

## 2022-10-13 PROCEDURE — 85025 COMPLETE CBC W/AUTO DIFF WBC: CPT

## 2022-10-13 PROCEDURE — 76705 ECHO EXAM OF ABDOMEN: CPT | Mod: 26

## 2022-10-13 PROCEDURE — 87086 URINE CULTURE/COLONY COUNT: CPT

## 2022-10-13 RX ORDER — CEFUROXIME AXETIL 250 MG
1 TABLET ORAL
Qty: 14 | Refills: 0
Start: 2022-10-13 | End: 2022-10-19

## 2022-10-13 RX ORDER — CEPHALEXIN 500 MG
500 CAPSULE ORAL ONCE
Refills: 0 | Status: COMPLETED | OUTPATIENT
Start: 2022-10-13 | End: 2022-10-13

## 2022-10-13 RX ORDER — SODIUM CHLORIDE 9 MG/ML
500 INJECTION INTRAMUSCULAR; INTRAVENOUS; SUBCUTANEOUS ONCE
Refills: 0 | Status: COMPLETED | OUTPATIENT
Start: 2022-10-13 | End: 2022-10-13

## 2022-10-13 RX ADMIN — Medication 500 MILLIGRAM(S): at 23:43

## 2022-10-13 RX ADMIN — SODIUM CHLORIDE 500 MILLILITER(S): 9 INJECTION INTRAMUSCULAR; INTRAVENOUS; SUBCUTANEOUS at 19:18

## 2022-10-13 NOTE — ED PROVIDER NOTE - OBJECTIVE STATEMENT
68 y/o Female with a Pmhx of Asthma, Obesity s/p Lap Band Surgery 2013, Sleep apnea on CPAP, Chronic Back pain, low Vit D,  Chronic venous insuffiencey of LE,  Umbilical hernia, HTN, PVD presents to the ED c/o sharp abd pain radiating to the right back for 4 days acute onset on Monday, progressively worsening since then. Pt last does of Tylenol was 4 am today. Denies SOB, nausea, vomiting. Recent constipation x 3 days, + small BM today. Pt with increased urine frequency. 66 y/o Female with a Pmhx of Asthma, Obesity s/p Lap Band Surgery 2013, Sleep apnea on CPAP, Chronic Back pain, low Vit D,  Chronic venous insufficiency of LEs,  Umbilical hernia, HTN, PVD presents via pvt car to the ED c/o sharp right upper abd pain radiating to the RLQ and right back for 4 days acute onset on Monday, 10/10, progressively worsening since then. Pt last dose of Tylenol was 4 am today. Denies SOB, nausea, vomiting. Recent constipation x 3 days, + small BM today. Pt with increased urine frequency.

## 2022-10-13 NOTE — ED PROVIDER NOTE - CARE PROVIDER_API CALL
Steven Zepeda)  Surgery  [address], [address]  [place] [address]  Phone: [phone]  Fax: [phone]  Follow Up Time:

## 2022-10-13 NOTE — ED PROVIDER NOTE - CARDIAC, MLM
Normal rate, regular rhythm.  Heart sounds S1, S2.  No murmurs, rubs or gallops. Normal rate, regular rhythm.  Heart sounds S1, S2.  No murmurs, rubs or gallops.  normal radial pulse.

## 2022-10-13 NOTE — ED ADULT TRIAGE NOTE - CHIEF COMPLAINT QUOTE
Pt a/ox3, reports abdominal pain. pt reports "I have bad abdominal pain that radiates around to my back for 4 days". last dose of Tylenol was 0400am today.

## 2022-10-13 NOTE — ED PROVIDER NOTE - PATIENT PORTAL LINK FT
You can access the FollowMyHealth Patient Portal offered by [facility] by registering at the following website: [email or web address]. By joining [facility]’s FollowMyHealth portal, you will also be able to view your health information using other applications (apps) compatible with our system.

## 2022-10-13 NOTE — ED PROVIDER NOTE - PROGRESS NOTE DETAILS
Eden Marquez   paging bariatric on call to discuss case LA Marquez MD:  Labs, u/s, CT reports d/w Dr. Zepeda.  Pt symptomatically improved but + mild R abd tender, much less than initially.  He offers admission for jn. sx, if pt declines & prefers outpt f/u, then f/u his office.  I d/w pt, who opts for outpt management.  Pt informed of all results as well, agrees with po Abx for ? UTI, outpt Sx. f/u. LA Marquez MD:  Labs, u/s, CT reports d/w Dr. Zepeda.  Pt symptomatically improved but + mild R abd tender, much less than initially.  He offers admission for jn. sx, if pt declines & prefers outpt f/u, then f/u his office.  I d/w pt, who opts for outpt management.  Pt informed of all results as well, agrees with po Abx for ? UTI, outpt Sx f/u.

## 2022-10-13 NOTE — ED PROVIDER NOTE - NSFOLLOWUPINSTRUCTIONS_ED_ALL_ED_FT
Take medication as prescribed.  Avoid greasy/fried/fatty foods.  Follow up Dr. Zepeda as listed below.  Continue your other medications as per routine.  Return to ED if much worsening pain without any relief, vomiting, fever, or other concern.  Follow up with your own regular doctor.        Biliary Colic, Adult       Biliary colic is severe pain caused by a problem with the gallbladder. The gallbladder is a small organ in the upper right part of the abdomen. The gallbladder stores a digestive fluid produced in the liver (bile) that helps the body break down fat. Bile and other digestive enzymes are carried from the liver to the small intestine through tube-like structures called bile ducts. The gallbladder and the bile ducts form the biliary tract.    Sometimes, hard deposits of digestive fluids (gallstones) form in the gallbladder and block the flow of bile from the gallbladder, causing biliary colic. This condition is also called a gallbladder attack. Gallstones can be as small as a grain of sand or as big as a golf ball. There could be just one gallstone in the gallbladder, or there could be many.      What are the causes?    This condition is usually caused by gallstones. Less often, a tumor could block the flow of bile from the gallbladder and trigger biliary colic.      What increases the risk?    The following factors may make you more likely to develop this condition:  •Being female.      •Having a family history of gallstones.      •Being obese.      •Losing weight suddenly or quickly.      •Eating a diet that is high in calories, low in fiber, and rich in refined carbohydrates, such as white bread and white rice.    •Having certain health conditions, such as:  •An intestinal disease that affects nutrient absorption, such as Crohn's disease.      •A metabolic condition, such as diabetes or metabolic syndrome. Metabolic syndrome occurs when someone has high blood pressure, high cholesterol, and diabetes.      •A blood condition, such as hemolytic anemia or sickle cell disease.          What are the signs or symptoms?    The main symptom of this condition is severe pain in the upper right side of the abdomen. You may feel this pain below the chest but above the hip. This pain often occurs at night or after eating a meal that is high in fat. This pain may get worse for up to an hour and last as long as 12 hours. In most cases, the pain fades (subsides) within 2 hours.    Other symptoms of this condition include:  •Nausea and vomiting.      •Pain under the right shoulder.        How is this diagnosed?    This condition is diagnosed based on your medical history, your symptoms, and a physical exam.    You may also have tests, including:  •Blood tests to rule out infection or inflammation of the bile ducts, gallbladder, pancreas, or liver.    •Imaging studies, such as:  •An ultrasound.      •A CT scan.      •An MRI.        In some cases, you may need to have an imaging study done using a small amount of radioactive material (nuclear medicine) to confirm the diagnosis.      How is this treated?    This condition may be treated with medicines to:  •Relieve your pain or nausea.      •Dissolve the gallstones. It may take months or years before the gallstones are completely gone.      If you have gallstones, or if you have a tumor in the gallbladder that is causing biliary colic, you may need surgery to remove the gallbladder (cholecystectomy).      Follow these instructions at home:    Eating and drinking     •Drink enough fluid to keep your urine pale yellow.    •Follow instructions from your health care provider about eating or drinking restrictions. These may include avoiding:  •Fatty, greasy, and fried foods.      •Any foods that make the pain worse.      •Overeating.      •Having a large meal after not eating for a while.        General instructions     •Take over-the-counter and prescription medicines only as told by your health care provider.      •Keep all follow-up visits as told by your health care provider. This is important.        How is this prevented?    Steps to prevent this condition include:  •Maintaining a healthy body weight.      •Getting regular exercise.      •Eating a healthy diet that is high in fiber and low in fat.      •Limiting how much sugar and refined carbohydrates you eat.        Contact a health care provider if:    •Your pain lasts more than 5 hours.      •You vomit.      •You have a fever and chills.      •Your pain gets worse.        Get help right away if:    •Your skin or the whites of your eyes look yellow (jaundice).      •Your have tea-colored urine and light-colored stools (feces).      •You are dizzy or you faint.        Summary    •Biliary colic is severe pain caused by a problem with the gallbladder. The gallbladder is a small organ in the upper right part of your abdomen.      •Treatment for this condition may include medicine to relieve your pain or nausea, or medicine to slowly dissolve the gallstones.      •If you have gallstones, or if you have a tumor in the gallbladder that is causing biliary colic, you may need surgery to remove the gallbladder (cholecystectomy).      This information is not intended to replace advice given to you by your health care provider. Make sure you discuss any questions you have with your health care provider.            Urinary Tract Infection, Adult       A urinary tract infection (UTI) is an infection of any part of the urinary tract. The urinary tract includes the kidneys, ureters, bladder, and urethra. These organs make, store, and get rid of urine in the body.    An upper UTI affects the ureters and kidneys. A lower UTI affects the bladder and urethra.      What are the causes?    Most urinary tract infections are caused by bacteria in your genital area around your urethra, where urine leaves your body. These bacteria grow and cause inflammation of your urinary tract.      What increases the risk?    You are more likely to develop this condition if:  •You have a urinary catheter that stays in place.      •You are not able to control when you urinate or have a bowel movement (incontinence).    •You are female and you:  •Use a spermicide or diaphragm for birth control.      •Have low estrogen levels.      •Are pregnant.        •You have certain genes that increase your risk.      •You are sexually active.      •You take antibiotic medicines.    •You have a condition that causes your flow of urine to slow down, such as:  •An enlarged prostate, if you are male.      •Blockage in your urethra.      •A kidney stone.      •A nerve condition that affects your bladder control (neurogenic bladder).      •Not getting enough to drink, or not urinating often.      •You have certain medical conditions, such as:  •Diabetes.      •A weak disease-fighting system (immunesystem).      •Sickle cell disease.      •Gout.      •Spinal cord injury.          What are the signs or symptoms?    Symptoms of this condition include:  •Needing to urinate right away (urgency).      •Frequent urination. This may include small amounts of urine each time you urinate.      •Pain or burning with urination.      •Blood in the urine.      •Urine that smells bad or unusual.      •Trouble urinating.      •Cloudy urine.      •Vaginal discharge, if you are female.      •Pain in the abdomen or the lower back.      You may also have:  •Vomiting or a decreased appetite.      •Confusion.      •Irritability or tiredness.      •A fever or chills.      •Diarrhea.      The first symptom in older adults may be confusion. In some cases, they may not have any symptoms until the infection has worsened.      How is this diagnosed?    This condition is diagnosed based on your medical history and a physical exam. You may also have other tests, including:  •Urine tests.      •Blood tests.      •Tests for STIs (sexually transmitted infections).      If you have had more than one UTI, a cystoscopy or imaging studies may be done to determine the cause of the infections.      How is this treated?    Treatment for this condition includes:  •Antibiotic medicine.      •Over-the-counter medicines to treat discomfort.      •Drinking enough water to stay hydrated.      If you have frequent infections or have other conditions such as a kidney stone, you may need to see a health care provider who specializes in the urinary tract (urologist).    In rare cases, urinary tract infections can cause sepsis. Sepsis is a life-threatening condition that occurs when the body responds to an infection. Sepsis is treated in the hospital with IV antibiotics, fluids, and other medicines.      Follow these instructions at home:     Medicines     •Take over-the-counter and prescription medicines only as told by your health care provider.      •If you were prescribed an antibiotic medicine, take it as told by your health care provider. Do not stop using the antibiotic even if you start to feel better.      General instructions   •Make sure you:  •Empty your bladder often and completely. Do not hold urine for long periods of time.      •Empty your bladder after sex.      •Wipe from front to back after urinating or having a bowel movement if you are female. Use each tissue only one time when you wipe.        •Drink enough fluid to keep your urine pale yellow.      •Keep all follow-up visits. This is important.        Contact a health care provider if:    •Your symptoms do not get better after 1–2 days.      •Your symptoms go away and then return.        Get help right away if:    •You have severe pain in your back or your lower abdomen.      •You have a fever or chills.      •You have nausea or vomiting.        Summary    •A urinary tract infection (UTI) is an infection of any part of the urinary tract, which includes the kidneys, ureters, bladder, and urethra.      •Most urinary tract infections are caused by bacteria in your genital area.      •Treatment for this condition often includes antibiotic medicines.      •If you were prescribed an antibiotic medicine, take it as told by your health care provider. Do not stop using the antibiotic even if you start to feel better.      •Keep all follow-up visits. This is important.      This information is not intended to replace advice given to you by your health care provider. Make sure you discuss any questions you have with your health care provider.

## 2022-10-13 NOTE — ED PROVIDER NOTE - NSICDXPASTSURGICALHX_GEN_ALL_CORE_FT
PAST SURGICAL HISTORY:  Hernia umbilical hernia repair 2012    LAP-BAND surgery status     S/P appendectomy teenager    S/P  ,    S/P tonsillectomy childhood    Vein symptom right leg laser vein cleanse and bypass

## 2022-10-13 NOTE — ED PROVIDER NOTE - CLINICAL SUMMARY MEDICAL DECISION MAKING FREE TEXT BOX
68 y/o Female with a Pmhx of Asthma, Obesity s/p Lap Band Surgery 2013, Sleep apnea on CPAP, Chronic Back pain, ,  Umbilical hernia, HTN, PVD presents to the ED c/o sharp right upper abd pain radiating to the RLQ and right back for 4 days acute onset on Monday, progressively worsening since then. Recent constipation x 3 days, + small BM today. Pt with increased urine frequency. Pt uncomfortable with diffuse right abd and suprapubic tenderness. Plan: urine with culture, labs including lipase, CT abd/pelvis, bariatric protocol, RUQ sono. 68 y/o Female with a Pmhx of Asthma, Obesity s/p Lap Band Surgery 2013, Sleep apnea on CPAP, Chronic Back pain, ,  Umbilical hernia, HTN, PVD presents to the ED c/o sharp right upper abd pain radiating to the RLQ and right back for 4 days acute onset on 10/10, progressively worsening since then. Recent constipation x 3 days, + small BM today. Pt with increased urine frequency. Pt uncomfortable with diffuse right abd and suprapubic tenderness.   Plan: urine with culture, labs including lipase, CT abd/pelvis bariatric protocol, RUQ sono.

## 2022-10-13 NOTE — ED PROVIDER NOTE - CONSTITUTIONAL, MLM
normal... obese older white female alert mild respiratory distress mild to moderate discomfort due to pain but non toxic obese older white female alert mild respiratory distress mild to moderate discomfort due to pain but non-toxic

## 2022-10-13 NOTE — ED PROVIDER NOTE - RESPIRATORY, MLM
Breath sounds clear and equal bilaterally. Breath sounds clear and equal bilaterally.  normal respirations.

## 2022-10-13 NOTE — ED PROVIDER NOTE - ENMT, MLM
Airway patent, Nasal mucosa clear. Mouth with normal mucosa. Throat has no vesicles, no oropharyngeal exudates and uvula is midline. oropharynx clear mucus membrane mildly dry Airway patent, Nasal mucosa clear. Throat has no vesicles, no oropharyngeal exudates and uvula is midline. oropharynx clear mucus membrane mildly dry

## 2022-10-13 NOTE — ED ADULT NURSE NOTE - OBJECTIVE STATEMENT
Patient c/o sudden onset right sided abdominal pain starting monday. Radiates to back. Has also had urinary frequency since monday, worsening. Denies N/V/D. Has history of lap band surgery in 2012.

## 2022-10-14 VITALS
DIASTOLIC BLOOD PRESSURE: 75 MMHG | OXYGEN SATURATION: 98 % | SYSTOLIC BLOOD PRESSURE: 145 MMHG | HEART RATE: 82 BPM | RESPIRATION RATE: 18 BRPM | TEMPERATURE: 99 F

## 2022-10-15 LAB
CULTURE RESULTS: SIGNIFICANT CHANGE UP
SPECIMEN SOURCE: SIGNIFICANT CHANGE UP

## 2022-10-17 ENCOUNTER — TRANSCRIPTION ENCOUNTER (OUTPATIENT)
Age: 67
End: 2022-10-17

## 2022-10-18 ENCOUNTER — INPATIENT (INPATIENT)
Facility: HOSPITAL | Age: 67
LOS: 0 days | Discharge: HOME | DRG: 418 | End: 2022-10-19
Attending: SPECIALIST | Admitting: SPECIALIST
Payer: COMMERCIAL

## 2022-10-18 ENCOUNTER — TRANSCRIPTION ENCOUNTER (OUTPATIENT)
Age: 67
End: 2022-10-18

## 2022-10-18 ENCOUNTER — RESULT REVIEW (OUTPATIENT)
Age: 67
End: 2022-10-18

## 2022-10-18 VITALS
SYSTOLIC BLOOD PRESSURE: 148 MMHG | OXYGEN SATURATION: 99 % | HEART RATE: 85 BPM | TEMPERATURE: 98 F | DIASTOLIC BLOOD PRESSURE: 77 MMHG | RESPIRATION RATE: 21 BRPM | WEIGHT: 292.99 LBS | HEIGHT: 64 IN

## 2022-10-18 DIAGNOSIS — Z98.84 BARIATRIC SURGERY STATUS: Chronic | ICD-10-CM

## 2022-10-18 DIAGNOSIS — K81.9 CHOLECYSTITIS, UNSPECIFIED: ICD-10-CM

## 2022-10-18 LAB
ALBUMIN SERPL ELPH-MCNC: 3.9 G/DL — SIGNIFICANT CHANGE UP (ref 3.3–5)
ALP SERPL-CCNC: 109 U/L — SIGNIFICANT CHANGE UP (ref 30–120)
ALT FLD-CCNC: 19 U/L DA — SIGNIFICANT CHANGE UP (ref 10–60)
ANION GAP SERPL CALC-SCNC: 8 MMOL/L — SIGNIFICANT CHANGE UP (ref 5–17)
APTT BLD: 35.4 SEC — SIGNIFICANT CHANGE UP (ref 27.5–35.5)
AST SERPL-CCNC: 40 U/L — SIGNIFICANT CHANGE UP (ref 10–40)
BASOPHILS # BLD AUTO: 0.05 K/UL — SIGNIFICANT CHANGE UP (ref 0–0.2)
BASOPHILS NFR BLD AUTO: 0.5 % — SIGNIFICANT CHANGE UP (ref 0–2)
BILIRUB SERPL-MCNC: 0.5 MG/DL — SIGNIFICANT CHANGE UP (ref 0.2–1.2)
BLD GP AB SCN SERPL QL: SIGNIFICANT CHANGE UP
BUN SERPL-MCNC: 8 MG/DL — SIGNIFICANT CHANGE UP (ref 7–23)
CALCIUM SERPL-MCNC: 9.2 MG/DL — SIGNIFICANT CHANGE UP (ref 8.4–10.5)
CHLORIDE SERPL-SCNC: 102 MMOL/L — SIGNIFICANT CHANGE UP (ref 96–108)
CO2 SERPL-SCNC: 28 MMOL/L — SIGNIFICANT CHANGE UP (ref 22–31)
CREAT SERPL-MCNC: 0.48 MG/DL — LOW (ref 0.5–1.3)
EGFR: 103 ML/MIN/1.73M2 — SIGNIFICANT CHANGE UP
EOSINOPHIL # BLD AUTO: 0.25 K/UL — SIGNIFICANT CHANGE UP (ref 0–0.5)
EOSINOPHIL NFR BLD AUTO: 2.7 % — SIGNIFICANT CHANGE UP (ref 0–6)
GLUCOSE SERPL-MCNC: 91 MG/DL — SIGNIFICANT CHANGE UP (ref 70–99)
HCT VFR BLD CALC: 40.4 % — SIGNIFICANT CHANGE UP (ref 34.5–45)
HGB BLD-MCNC: 13.1 G/DL — SIGNIFICANT CHANGE UP (ref 11.5–15.5)
IMM GRANULOCYTES NFR BLD AUTO: 0.4 % — SIGNIFICANT CHANGE UP (ref 0–0.9)
INR BLD: 1.07 RATIO — SIGNIFICANT CHANGE UP (ref 0.88–1.16)
LIDOCAIN IGE QN: 55 U/L — LOW (ref 73–393)
LYMPHOCYTES # BLD AUTO: 2.02 K/UL — SIGNIFICANT CHANGE UP (ref 1–3.3)
LYMPHOCYTES # BLD AUTO: 21.9 % — SIGNIFICANT CHANGE UP (ref 13–44)
MCHC RBC-ENTMCNC: 30 PG — SIGNIFICANT CHANGE UP (ref 27–34)
MCHC RBC-ENTMCNC: 32.4 GM/DL — SIGNIFICANT CHANGE UP (ref 32–36)
MCV RBC AUTO: 92.4 FL — SIGNIFICANT CHANGE UP (ref 80–100)
MONOCYTES # BLD AUTO: 0.86 K/UL — SIGNIFICANT CHANGE UP (ref 0–0.9)
MONOCYTES NFR BLD AUTO: 9.3 % — SIGNIFICANT CHANGE UP (ref 2–14)
NEUTROPHILS # BLD AUTO: 5.99 K/UL — SIGNIFICANT CHANGE UP (ref 1.8–7.4)
NEUTROPHILS NFR BLD AUTO: 65.2 % — SIGNIFICANT CHANGE UP (ref 43–77)
NRBC # BLD: 0 /100 WBCS — SIGNIFICANT CHANGE UP (ref 0–0)
PLATELET # BLD AUTO: 281 K/UL — SIGNIFICANT CHANGE UP (ref 150–400)
POTASSIUM SERPL-MCNC: 4.8 MMOL/L — SIGNIFICANT CHANGE UP (ref 3.5–5.3)
POTASSIUM SERPL-MCNC: 5.6 MMOL/L — HIGH (ref 3.5–5.3)
POTASSIUM SERPL-SCNC: 4.8 MMOL/L — SIGNIFICANT CHANGE UP (ref 3.5–5.3)
POTASSIUM SERPL-SCNC: 5.6 MMOL/L — HIGH (ref 3.5–5.3)
PROT SERPL-MCNC: 8.3 G/DL — SIGNIFICANT CHANGE UP (ref 6–8.3)
PROTHROM AB SERPL-ACNC: 12.3 SEC — SIGNIFICANT CHANGE UP (ref 10.5–13.4)
RBC # BLD: 4.37 M/UL — SIGNIFICANT CHANGE UP (ref 3.8–5.2)
RBC # FLD: 13.5 % — SIGNIFICANT CHANGE UP (ref 10.3–14.5)
SARS-COV-2 RNA SPEC QL NAA+PROBE: SIGNIFICANT CHANGE UP
SODIUM SERPL-SCNC: 138 MMOL/L — SIGNIFICANT CHANGE UP (ref 135–145)
WBC # BLD: 9.21 K/UL — SIGNIFICANT CHANGE UP (ref 3.8–10.5)
WBC # FLD AUTO: 9.21 K/UL — SIGNIFICANT CHANGE UP (ref 3.8–10.5)

## 2022-10-18 PROCEDURE — 71045 X-RAY EXAM CHEST 1 VIEW: CPT | Mod: 26

## 2022-10-18 PROCEDURE — 99223 1ST HOSP IP/OBS HIGH 75: CPT

## 2022-10-18 PROCEDURE — 76705 ECHO EXAM OF ABDOMEN: CPT | Mod: 26

## 2022-10-18 PROCEDURE — 88304 TISSUE EXAM BY PATHOLOGIST: CPT | Mod: 26

## 2022-10-18 PROCEDURE — 99285 EMERGENCY DEPT VISIT HI MDM: CPT

## 2022-10-18 PROCEDURE — 93010 ELECTROCARDIOGRAM REPORT: CPT

## 2022-10-18 PROCEDURE — 47562 LAPAROSCOPIC CHOLECYSTECTOMY: CPT | Mod: 80

## 2022-10-18 DEVICE — CLIP APPLIER COVIDIEN ENDOCLIP II 10MM MED/LG: Type: IMPLANTABLE DEVICE | Status: FUNCTIONAL

## 2022-10-18 DEVICE — CLIP APPLIER COVIDIEN ENDOCLIP 10MM LARGE: Type: IMPLANTABLE DEVICE | Status: FUNCTIONAL

## 2022-10-18 RX ORDER — HYDROMORPHONE HYDROCHLORIDE 2 MG/ML
0.25 INJECTION INTRAMUSCULAR; INTRAVENOUS; SUBCUTANEOUS
Refills: 0 | Status: DISCONTINUED | OUTPATIENT
Start: 2022-10-18 | End: 2022-10-19

## 2022-10-18 RX ORDER — MORPHINE SULFATE 50 MG/1
4 CAPSULE, EXTENDED RELEASE ORAL ONCE
Refills: 0 | Status: DISCONTINUED | OUTPATIENT
Start: 2022-10-18 | End: 2022-10-18

## 2022-10-18 RX ORDER — PIPERACILLIN AND TAZOBACTAM 4; .5 G/20ML; G/20ML
3.38 INJECTION, POWDER, LYOPHILIZED, FOR SOLUTION INTRAVENOUS ONCE
Refills: 0 | Status: COMPLETED | OUTPATIENT
Start: 2022-10-18 | End: 2022-10-18

## 2022-10-18 RX ORDER — ONDANSETRON 8 MG/1
4 TABLET, FILM COATED ORAL ONCE
Refills: 0 | Status: DISCONTINUED | OUTPATIENT
Start: 2022-10-18 | End: 2022-10-19

## 2022-10-18 RX ORDER — CEFAZOLIN SODIUM 1 G
3000 VIAL (EA) INJECTION ONCE
Refills: 0 | Status: COMPLETED | OUTPATIENT
Start: 2022-10-18 | End: 2022-10-18

## 2022-10-18 RX ORDER — HYDROMORPHONE HYDROCHLORIDE 2 MG/ML
0.5 INJECTION INTRAMUSCULAR; INTRAVENOUS; SUBCUTANEOUS
Refills: 0 | Status: DISCONTINUED | OUTPATIENT
Start: 2022-10-18 | End: 2022-10-19

## 2022-10-18 RX ORDER — OXYCODONE HYDROCHLORIDE 5 MG/1
5 TABLET ORAL EVERY 4 HOURS
Refills: 0 | Status: DISCONTINUED | OUTPATIENT
Start: 2022-10-18 | End: 2022-10-19

## 2022-10-18 RX ORDER — INFLUENZA VIRUS VACCINE 15; 15; 15; 15 UG/.5ML; UG/.5ML; UG/.5ML; UG/.5ML
0.7 SUSPENSION INTRAMUSCULAR ONCE
Refills: 0 | Status: DISCONTINUED | OUTPATIENT
Start: 2022-10-18 | End: 2022-10-19

## 2022-10-18 RX ORDER — SODIUM CHLORIDE 9 MG/ML
1000 INJECTION INTRAMUSCULAR; INTRAVENOUS; SUBCUTANEOUS
Refills: 0 | Status: DISCONTINUED | OUTPATIENT
Start: 2022-10-18 | End: 2022-10-19

## 2022-10-18 RX ORDER — ACETAMINOPHEN 500 MG
1000 TABLET ORAL EVERY 8 HOURS
Refills: 0 | Status: DISCONTINUED | OUTPATIENT
Start: 2022-10-18 | End: 2022-10-19

## 2022-10-18 RX ORDER — SODIUM CHLORIDE 9 MG/ML
1000 INJECTION, SOLUTION INTRAVENOUS
Refills: 0 | Status: DISCONTINUED | OUTPATIENT
Start: 2022-10-18 | End: 2022-10-19

## 2022-10-18 RX ORDER — CHLORHEXIDINE GLUCONATE 213 G/1000ML
1 SOLUTION TOPICAL ONCE
Refills: 0 | Status: COMPLETED | OUTPATIENT
Start: 2022-10-18 | End: 2022-10-18

## 2022-10-18 RX ORDER — MORPHINE SULFATE 50 MG/1
2 CAPSULE, EXTENDED RELEASE ORAL EVERY 4 HOURS
Refills: 0 | Status: DISCONTINUED | OUTPATIENT
Start: 2022-10-18 | End: 2022-10-19

## 2022-10-18 RX ORDER — SODIUM CHLORIDE 9 MG/ML
1000 INJECTION INTRAMUSCULAR; INTRAVENOUS; SUBCUTANEOUS ONCE
Refills: 0 | Status: COMPLETED | OUTPATIENT
Start: 2022-10-18 | End: 2022-10-18

## 2022-10-18 RX ADMIN — Medication 1000 MILLIGRAM(S): at 22:30

## 2022-10-18 RX ADMIN — SODIUM CHLORIDE 75 MILLILITER(S): 9 INJECTION, SOLUTION INTRAVENOUS at 18:15

## 2022-10-18 RX ADMIN — PIPERACILLIN AND TAZOBACTAM 200 GRAM(S): 4; .5 INJECTION, POWDER, LYOPHILIZED, FOR SOLUTION INTRAVENOUS at 06:03

## 2022-10-18 RX ADMIN — Medication 1000 MILLIGRAM(S): at 22:15

## 2022-10-18 RX ADMIN — SODIUM CHLORIDE 1000 MILLILITER(S): 9 INJECTION INTRAMUSCULAR; INTRAVENOUS; SUBCUTANEOUS at 01:42

## 2022-10-18 RX ADMIN — SODIUM CHLORIDE 75 MILLILITER(S): 9 INJECTION, SOLUTION INTRAVENOUS at 22:14

## 2022-10-18 RX ADMIN — PIPERACILLIN AND TAZOBACTAM 200 GRAM(S): 4; .5 INJECTION, POWDER, LYOPHILIZED, FOR SOLUTION INTRAVENOUS at 12:00

## 2022-10-18 RX ADMIN — MORPHINE SULFATE 4 MILLIGRAM(S): 50 CAPSULE, EXTENDED RELEASE ORAL at 01:30

## 2022-10-18 RX ADMIN — CHLORHEXIDINE GLUCONATE 1 APPLICATION(S): 213 SOLUTION TOPICAL at 14:47

## 2022-10-18 RX ADMIN — MORPHINE SULFATE 4 MILLIGRAM(S): 50 CAPSULE, EXTENDED RELEASE ORAL at 01:42

## 2022-10-18 NOTE — DISCHARGE NOTE PROVIDER - NSDCFUADDINST_GEN_ALL_CORE_FT
Follow all verbal and written instructions. Take medications as prescribed. DO NOT drive, operate machinery, and/or make important decisions while on prescription pain medication. DO NOT hesitate to call Doctor's office with questions or concerns. Certain prescription pain medication can cause constipation; take a stool softener such as Colace 100mg 3 x a day, to avoid the constipating effects of prescription pain medication.    For the next 24-48 hours while awake, alternate ice pack 15 minutes on & 15 minutes off    Call Dr. Elizondo's office - [address] [place] [address].  Phone: [phone]    and make an appointment to be seen within 7- 10 days.   Follow all verbal and written instructions. Take medications as prescribed. DO NOT drive, operate machinery, and/or make important decisions while on prescription pain medication. DO NOT hesitate to call Doctor's office with questions or concerns. Certain prescription pain medication can cause constipation; take a stool softener such as Colace 100mg 3 x a day, to avoid the constipating effects of prescription pain medication.  Regular ambulation  Deep breathing/incentive spirometer.       For the next 24-48 hours while awake, alternate ice pack 15 minutes on & 15 minutes off    Call Dr. Elizondo's office - [address] [place] [address] [address] [place] [address].  Phone: [phone]    and make an appointment to be seen within 7- 10 days.   Follow all verbal and written instructions. Take medications as prescribed. DO NOT drive, operate machinery, and/or make important decisions while on prescription pain medication. DO NOT hesitate to call Doctor's office with questions or concerns. Certain prescription pain medication can cause constipation; take a stool softener such as Colace 100mg 3 x a day, to avoid the constipating effects of prescription pain medication.  Regular ambulation  Deep breathing/incentive spirometer.   Empty and record JER output and bring with you to office.     For the next 24-48 hours while awake, alternate ice pack 15 minutes on & 15 minutes off    Call Dr. Elizondo's office - [address] [place] [address].  Phone: [phone]    and make an appointment to be seen within 7- 10 days.   REST! Apply ice packs to incision sites 20 mins on, 20 mins off every 4 hours for first 24 hours.  If taking narcotic pain medications, may take COLACE (OTC stool softener) as directed to prevent constipation.  Increase ambulation and use incentive spirometer as directed.

## 2022-10-18 NOTE — CONSULT NOTE ADULT - SUBJECTIVE AND OBJECTIVE BOX
History of Present Illness: The patient is a 67 year old female with a history of HTN, JOSE on CPAP, asthma, chronic venous insufficiency who presents with abdominal pain. She has been having right-sided abdominal pain radiating to right back for the past week. She was diagnosed with cholecystitis and plan was for outpatient cholecystectomy. However, pain worsened and she presented as inpatient for surgery. She denies chest pain. At time she has shortness of breath at night. She had an echo done this month reportedly normal.    Past Medical/Surgical History:  HTN, JOSE on CPAP, asthma, chronic venous insufficiency    Medications:  Home Medications:  Aqueous Vitamin D: 1 application orally once a week (02 Jan 2022 00:27)  hydroCHLOROthiazide 25 mg oral tablet: 1 tab(s) orally once a day (02 Jan 2022 00:27)  meloxicam 15 mg oral tablet: 1 tab(s) orally once a day (02 Jan 2022 00:27)    Family History: Non-contributory family history of premature cardiovascular atherosclerotic disease    Social History: No tobacco, alcohol or drug use    Review of Systems:  General: No fevers, chills, weight gain  Skin: No rashes, color changes  Cardiovascular: No chest pain, orthopnea  Respiratory: No shortness of breath, cough  Gastrointestinal: No nausea, abdominal pain  Genitourinary: No incontinence, pain with urination  Musculoskeletal: No pain, swelling, decreased range of motion  Neurological: No headache, weakness  Psychiatric: No depression, anxiety  Endocrine: No weight gain, increased thirst  All other systems are comprehensively negative.    Physical Exam:  Vitals:        Vital Signs Last 24 Hrs  T(C): 36.5 (18 Oct 2022 08:20), Max: 36.7 (18 Oct 2022 07:15)  T(F): 97.7 (18 Oct 2022 08:20), Max: 98 (18 Oct 2022 07:15)  HR: 85 (18 Oct 2022 08:20) (70 - 85)  BP: 148/77 (18 Oct 2022 08:20) (134/79 - 157/81)  BP(mean): 100 (18 Oct 2022 08:20) (100 - 100)  RR: 21 (18 Oct 2022 08:20) (18 - 21)  SpO2: 99% (18 Oct 2022 08:20) (97% - 99%)  Parameters below as of 18 Oct 2022 08:20  Patient On (Oxygen Delivery Method): room air  General: NAD  HEENT: MMM  Neck: No JVD, no carotid bruit  Lungs: CTAB  CV: RRR, nl S1/S2, no M/R/G  Abdomen: S/NT/ND, +BS  Extremities: 1+ LE edema, no cyanosis  Neuro: AAOx3, non-focal  Skin: Chronic skin changes    Labs:                        13.1   9.21  )-----------( 281      ( 18 Oct 2022 01:43 )             40.4     10-18    x   |  x   |  x   ----------------------------<  x   4.8   |  x   |  x     Ca    9.2      18 Oct 2022 01:43    TPro  8.3  /  Alb  3.9  /  TBili  0.5  /  DBili  x   /  AST  40  /  ALT  19  /  AlkPhos  109  10-18        PT/INR - ( 18 Oct 2022 05:29 )   PT: 12.3 sec;   INR: 1.07 ratio         PTT - ( 18 Oct 2022 05:29 )  PTT:35.4 sec    ECG: NSR, normal axis, no ST abnormality

## 2022-10-18 NOTE — CONSULT NOTE ADULT - ASSESSMENT
The patient is a 67 year old female with a history of HTN, JOSE on CPAP, asthma, chronic venous insufficiency who is admitted with cholecystitis.    Plan:  - ECG with no evidence of ischemia or infarction  - Recent echo reportedly normal as per patient  - Leg swelling - long-standing issue due to chronic venous insufficiency  - Resume HCTZ post-operatively  - There are no active cardiac issues. The patient is at intermediate risk for cardiac events for an intermediate risk surgery and is optimized to proceed from a cardiac standpoint.

## 2022-10-18 NOTE — ED PROVIDER NOTE - OBJECTIVE STATEMENT
Patient complains of right upper quadrant pain radiating to the back and lower abdomen for approximately 1 week.  Pain has been waxing and waning over that period of time.  Patient was seen at another emergency department 4 days ago and was found to have gallstones.  Patient was also treated for UTI at that time.  Patient went to see her surgeon today and was told she would need to have her gallbladder removed.  Pain became more severe tonight so patient came here.  No fever.  No nausea or vomiting.  No diarrhea or constipation.  No urinary symptoms.  Patient has history of gastric band and hernia repair.  Patient had an ultrasound and a CAT scan done 4 days ago.

## 2022-10-18 NOTE — DISCHARGE NOTE PROVIDER - CARE PROVIDERS DIRECT ADDRESSES
,DirectAddress_Unknown ,DirectAddress_Unknown,[email or web address]

## 2022-10-18 NOTE — H&P ADULT - NSHPSOCIALHISTORY_GEN_ALL_CORE
Never smoked Smoked cigarettes in the past - does not smoke now - never vaped  does not drink or take drugs

## 2022-10-18 NOTE — CONSULT NOTE ADULT - ASSESSMENT
66 yo Fi with hx of HTN, admitted to surgery for acute cholecystitis. Medical consulted for perioperative medical management        Acute cholecystitis  Patient is ASA class III . Ashton score > 1 percent for perioperative cardiac events.Patient is high risk for intermediate surgery  Recommend preoperative EKG and echo prior to any surgeries  Recommend cardiology consult prior to surgery    Hx of HTN:  c/w home meds    Hx of Sleep Apnea  Recommend Pulm consult    dvt ppx: as per surgery   68 yo Fi with hx of HTN, admitted to surgery for acute cholecystitis. Medical consulted for perioperative medical management        Acute cholecystitis  Patient is ASA class III . Ashton score  .5 percent for perioperative cardiac events. Patient is elevated risk for intermediate surgery  Recommend cardiology consult prior to surgery    Hx of HTN:  c/w home meds    Hx of Sleep Apnea  Recommend Pulm consult    dvt ppx: as per surgery

## 2022-10-18 NOTE — ED PROVIDER NOTE - CLINICAL SUMMARY MEDICAL DECISION MAKING FREE TEXT BOX
Patient with known gallstones having increased RUQ pain. Will get labs, treat pain, and repeat sono to r/o cholelithiasis. will reassess when results available

## 2022-10-18 NOTE — H&P ADULT - HISTORY OF PRESENT ILLNESS
Patient complains of right upper quadrant pain radiating to the back and lower abdomen for approximately 1 week.  Pain has been waxing and waning over that period of time.  Patient was seen at another emergency department 4 days ago and was found to have gallstones.  Patient was also treated for UTI at that time.  Patient went to see her surgeon today and was told she would need to have her gallbladder removed.  Pain became more severe tonight so patient came here.  No fever.  No nausea or vomiting.  No diarrhea or constipation.  No urinary symptoms.  Patient has history of gastric band and hernia repair.  Patient had an ultrasound and a CAT scan done 4 days ago. Patient complains of right upper quadrant pain radiating to the back and lower abdomen for approximately 1 week.    Pain has been waxing and waning over that period of time.  Patient was seen at another emergency department 4 days ago and was found to have gallstones.  Patient was also treated for UTI at that time.    Patient went to see her surgeon today and was told she would need to have her gallbladder removed.  Pain became more severe tonight so patient came here.   No fever.  No nausea or vomiting.  No diarrhea or constipation.  No urinary symptoms.  Patient has history of gastric band and hernia repair.    Patient had an ultrasound and a CAT scan done 4 days ago. Pt is a 66 y/o F who presented to  ED with abdominal pain. PMHx/PSHx of Asthma, Obesity s/p Lap Band Surgery 2013, Sleep apnea on CPAP, Chronic Back pain, low Vit D,  Chronic venous insuffiencey of LE.   Patient complains of right upper quadrant pain radiating to the back and lower abdomen for approximately 1 week.    Pain has been waxing and waning over that period of time.  Patient was seen at another emergency department 4 days ago and was found to have gallstones.  Patient was also treated for UTI at that time.    Patient went to see her surgeon today and was told she would need to have her gallbladder removed.  Pain became more severe tonight so patient came here.   No fever.  No nausea or vomiting.  No diarrhea or constipation.  No urinary symptoms.  Patient has history of gastric band and hernia repair.    Patient had an ultrasound and a CAT scan done 4 days ago. Pt is a 68 y/o F who presented to  ED with abdominal pain. PMHx/PSHx of Asthma, Obesity s/p Lap Band Surgery 2013, Sleep apnea on CPAP, Chronic Back pain, low Vit D,  Chronic venous insuffiencey of LE.   Patient complains of right upper quadrant pain radiating to the back and lower abdomen for approximately 1 week.      Pain has been waxing and waning over that period of time.  Patient was seen at another emergency department 4 days ago and was found to have gallstones.  Patient was also treated for UTI at that time.    Patient went to see her surgeon today and was told she would need to have her gallbladder removed.  Pain became more severe tonight so patient came here.     No fever.  No nausea or vomiting.  No diarrhea or constipation.  No urinary symptoms.  Patient has history of gastric band and hernia repair.    Patient had an ultrasound and a CAT scan done 4 days ago - impression -> cholelithiasis. Pt is a 68 y/o F who presented to  ED with abdominal pain. PMHx/PSHx of Asthma, Obesity s/p Lap Band Surgery 2013, Sleep apnea on CPAP, Chronic Back pain, low Vit D,  Chronic venous insuffiencey of LE.   Patient complains of right upper quadrant pain radiating to the back and lower abdomen for approximately 1 week.      Pain has been waxing and waning over that period of time.  Patient was seen at another emergency department 4 days ago and was found to have gallstones.  Patient was also treated for UTI at that time.    Patient went to see her surgeon today and was told she would need to have her gallbladder removed.  Pain became more severe tonight so patient came here.     No fever.  No nausea or vomiting.  No diarrhea or constipation.  No urinary symptoms.  Patient has history of gastric band and hernia repair.    Patient had an ultrasound and a CAT scan done 4 days ago - impression -> cholelithiasis/acute cholecystitis. Pt is a 66 y/o F who presented to  ED with abdominal pain. PMHx/PSHx of Asthma, Obesity s/p Lap Band Surgery 2013, Sleep apnea on CPAP, Chronic Back pain, low Vit D,  Chronic venous insuffiencey of LE.   Patient complains of right upper quadrant pain radiating to the back and lower abdomen for approximately 1 week.      Pain has been waxing and waning over that period of time.  Patient was seen at another emergency department 4 days ago and was found to have gallstones.  Patient was also treated for UTI at that time.    Patient went to see her surgeon today and was told she would need to have her gallbladder removed.  Pain became more severe tonight so patient came here.     No fever.  No nausea or vomiting.  No diarrhea or constipation.  No urinary symptoms.  Patient has history of gastric band and hernia repair.    Patient had an ultrasound and a CAT scan done 4 days ago - impression -> cholelithiasis. Today's US -> cholelithiasis/acute cholecystitis.

## 2022-10-18 NOTE — CONSULT NOTE ADULT - SUBJECTIVE AND OBJECTIVE BOX
66yo F with hx of HTN sleep apnea complains of right upper quadrant pain radiating to the back and lower abdomen for approximately 1 week.  Pain has been waxing and waning over that period of time.  Patient was seen at another emergency department 4 days ago and was found to have gallstones.  Patient was also treated for UTI at that time.  Patient went to see her surgeon today and was told she would need to have her gallbladder removed.  Pain became more severe tonight so patient came here.  No fever.  No nausea or vomiting.  No diarrhea or constipation.  No urinary symptoms.  Patient has history of gastric band and hernia repair.  Patient had an ultrasound and a CAT scan done 4 days ago.Patient Patient admitted to surgery for acute cholecystitis. Medical consulted for perioperative medical management      ALLERGIES  aspirin (Unknown)    PAST MEDICAL AND SURGICAL  Cholecystitis    FH: type 2 diabetes (Father, Sibling)    Family history of hypertension (Sibling, Mother)    Handoff    MEWS Score    Asthma    Circulation problem    Umbilical hernia    Obesity    Back pain    Sleep apnea    Cholecystitis    Hernia    S/P     S/P tonsillectomy    S/P appendectomy    Vein symptom    LAP-BAND surgery status    BACK PAIN    4    [email or web address]        FAMILY HISTORY  FH: type 2 diabetes (Father, Sibling)    Family history of hypertension (Sibling, Mother)        SOCIAL HX: denies smoking alcohol recreational drugs    Home meds:  Aqueous Vitamin D  hydroCHLOROthiazide 25 mg oral tablet  meloxicam 15 mg oral tablet      Review of Systems:  General:denies fever chills, headache, weakness  HEENT: denies blurry vision,diffculty swallowing, difficulty hearing, tinnitus  Cardiovascular: denies chest pain  ,palpitations  Pulmonary:denies shortness of breath, cough, wheezing, hemoptysis  Gastrointestinal: denies abdominal pain, constipation, diarrhea,nausea , vomiting, hematochezia  : denies hematuria, dysuria, or incontinence  Neurological: denies weakness, numbness , tingling, dizziness, tremors  MSK: denies muscle pain, difficulty ambulating, swelling, back pain  skin: denies skin rash, itching, burning, or  skin lesions  Psychiatrical: denies mood disturbances, anxierty, feeling depressed, depression , or difficulty sleeping    Objective:  Vitals  T(C): 36.7 (10-18-22 @ 07:15), Max: 36.7 (10-18-22 @ 07:15)  HR: 84 (10-18-22 @ 07:15) (70 - 85)  BP: 134/79 (10-18-22 @ 07:15) (134/79 - 157/81)  RR: 18 (10-18-22 @ 07:15) (18 - 21)  SpO2: 99% (10-18-22 @ 07:15) (97% - 99%)    Physical Exam:  General: comfortable, no acute distress, well nourished  HEENT: Atraumatic, no LAD, trachea midline, PERRLA  Cardiovascular: normal s1s2, no murmurs, gallops or fricition rubs  Pulmonary: clear to ausculation Bilaterally, no wheezing , rhonchi  Gastrointestinal: soft non tender non distended, no masses felt, no organomegally  Muscloskeletal: no lower extremity edema, intact bilateral lower extremity pulses  Neurological: CN II-12 intact. No focal weakness  Psychiatrical: normal mood, cooperative  SKIN: no rash, lesions or ulcers    Labs:                          13.1   9.21  )-----------( 281      ( 18 Oct 2022 01:43 )             40.4     10-18    x   |  x   |  x   ----------------------------<  x   4.8   |  x   |  x     Ca    9.2      18 Oct 2022 01:43    TPro  8.3  /  Alb  3.9  /  TBili  0.5  /  DBili  x   /  AST  40  /  ALT  19  /  AlkPhos  109  10-18    LIVER FUNCTIONS - ( 18 Oct 2022 01:43 )  Alb: 3.9 g/dL / Pro: 8.3 g/dL / ALK PHOS: 109 U/L / ALT: 19 U/L DA / AST: 40 U/L / GGT: x           PT/INR - ( 18 Oct 2022 05:29 )   PT: 12.3 sec;   INR: 1.07 ratio         PTT - ( 18 Oct 2022 05:29 )  PTT:35.4 sec      Active Medications  MEDICATIONS  (STANDING):  sodium chloride 0.9%. 1000 milliLiter(s) (125 mL/Hr) IV Continuous <Continuous>    MEDICATIONS  (PRN):  morphine  - Injectable 2 milliGRAM(s) IV Push every 4 hours PRN Moderate Pain (4 - 6)     68yo F with hx of HTN sleep apnea complains of right upper quadrant pain radiating to the back and lower abdomen for approximately 1 week.  Pain has been waxing and waning over that period of time.  Patient was seen at another emergency department 4 days ago and was found to have gallstones.  Patient was also treated for UTI at that time.  Patient went to see her surgeon today and was told she would need to have her gallbladder removed.  Pain became more severe tonight so patient came here.  No fever.  No nausea or vomiting.  No diarrhea or constipation.  No urinary symptoms.  Patient has history of gastric band and hernia repair.  Patient had an ultrasound and a CAT scan done 4 days ago.Patient Patient admitted to surgery for acute cholecystitis. Medical consulted for perioperative medical management    Cherelle reports she has "poor circulation of her right leg:. Her cardiologist, Dr. Silva scheduled her for an outpatient stress test on the     ALLERGIES  aspirin (Unknown)    PAST MEDICAL AND SURGICAL  Cholecystitis    FH: type 2 diabetes (Father, Sibling)    Family history of hypertension (Sibling, Mother)    Handoff    MEWS Score    Asthma    Circulation problem    Umbilical hernia    Obesity    Back pain    Sleep apnea    Cholecystitis    Hernia    S/P     S/P tonsillectomy    S/P appendectomy    Vein symptom    LAP-BAND surgery status    BACK PAIN    4    [email or web address]        FAMILY HISTORY  FH: type 2 diabetes (Father, Sibling)    Family history of hypertension (Sibling, Mother)        SOCIAL HX: denies smoking alcohol recreational drugs    Home meds:  Aqueous Vitamin D  hydroCHLOROthiazide 25 mg oral tablet  meloxicam 15 mg oral tablet      Review of Systems:  General:denies fever chills, headache, weakness  HEENT: denies blurry vision,diffculty swallowing, difficulty hearing, tinnitus  Cardiovascular: denies chest pain  ,palpitations  Pulmonary:denies shortness of breath, cough, wheezing, hemoptysis  Gastrointestinal: denies abdominal pain, constipation, diarrhea,nausea , vomiting, hematochezia  : denies hematuria, dysuria, or incontinence  Neurological: denies weakness, numbness , tingling, dizziness, tremors  MSK: denies muscle pain, difficulty ambulating, swelling, back pain  skin: denies skin rash, itching, burning, or  skin lesions  Psychiatrical: denies mood disturbances, anxierty, feeling depressed, depression , or difficulty sleeping    Objective:  Vitals  T(C): 36.7 (10-18-22 @ 07:15), Max: 36.7 (10-18-22 @ 07:15)  HR: 84 (10-18-22 @ 07:15) (70 - 85)  BP: 134/79 (10-18-22 @ 07:15) (134/79 - 157/81)  RR: 18 (10-18-22 @ 07:15) (18 - 21)  SpO2: 99% (10-18-22 @ 07:15) (97% - 99%)    Physical Exam:  General: comfortable, no acute distress, well nourished  HEENT: Atraumatic, no LAD, trachea midline, PERRLA  Cardiovascular: normal s1s2, no murmurs, gallops or fricition rubs  Pulmonary: clear to ausculation Bilaterally, no wheezing , rhonchi  Gastrointestinal: soft non tender non distended, no masses felt, no organomegally  Muscloskeletal: no lower extremity edema, intact bilateral lower extremity pulses  Neurological: CN II-12 intact. No focal weakness  Psychiatrical: normal mood, cooperative  SKIN: no rash, lesions or ulcers    Labs:                          13.1   9.21  )-----------( 281      ( 18 Oct 2022 01:43 )             40.4     10-18    x   |  x   |  x   ----------------------------<  x   4.8   |  x   |  x     Ca    9.2      18 Oct 2022 01:43    TPro  8.3  /  Alb  3.9  /  TBili  0.5  /  DBili  x   /  AST  40  /  ALT  19  /  AlkPhos  109  10-18    LIVER FUNCTIONS - ( 18 Oct 2022 01:43 )  Alb: 3.9 g/dL / Pro: 8.3 g/dL / ALK PHOS: 109 U/L / ALT: 19 U/L DA / AST: 40 U/L / GGT: x           PT/INR - ( 18 Oct 2022 05:29 )   PT: 12.3 sec;   INR: 1.07 ratio         PTT - ( 18 Oct 2022 05:29 )  PTT:35.4 sec      Active Medications  MEDICATIONS  (STANDING):  sodium chloride 0.9%. 1000 milliLiter(s) (125 mL/Hr) IV Continuous <Continuous>    MEDICATIONS  (PRN):  morphine  - Injectable 2 milliGRAM(s) IV Push every 4 hours PRN Moderate Pain (4 - 6)

## 2022-10-18 NOTE — DISCHARGE NOTE PROVIDER - HOSPITAL COURSE
The patient is a 68 y/o female who presented with abdominal pain on 10/18/22. Hx of previous surgery Lap Band and Port placement.   After admission and receiving preoperative parenteral prophylactic antibiotics, the patient underwent an uneventful and uncomplicated Laparoscopic Cholecystectomy by Dr KENDALL Elizondo.   After completion of surgery and emergence from anesthesia, the patient was transferred to the PACU for routine hemodynamic, airway, and pain monitoring.  After a stable PACU course, the patient was transferred to the surgical unit for acute post-operative care. The patient had a stable and uncomplicated hospital course with no medical complications.      Pre-operative medications were continued during the post-op course, and adjusted if medically needed.  A medical consultation from the Hospitalist service was available for post-operative medical co-management.    Lab work ordering/results was followed by the Medical/Surgical team.  The patient had a clean appearing surgical incision with no sign of surgical site infections.  Discharge instructions were delineated in the Discharge Plan and reviewed with the patient.  All medications were listed in the medication reconciliation document, and morales points were reviewed with the patient.  The patient was deemed stable for discharge today (date).  Upon discharge, the patient will following up with Dr KENDALL Elizondo whose office number may be found on the discharge instructions.   The patient is a 68 y/o female who presented with abdominal pain on 10/18/22.  PMHx of Asthma, Obesity s/p Lap Band Surgery 2013, Sleep apnea on CPAP, Chronic Back pain, low Vit D,  Chronic venous insuffiencey of LE.   After admission and receiving preoperative parenteral prophylactic antibiotics, the patient underwent an uneventful and uncomplicated Laparoscopic Cholecystectomy by Dr KENDALL Elizondo.   After completion of surgery and emergence from anesthesia, the patient was transferred to the PACU for routine hemodynamic, airway, and pain monitoring.  After a stable PACU course, the patient was transferred to the surgical unit for acute post-operative care. The patient had a stable and uncomplicated hospital course with no medical complications.      Pre-operative medications were continued during the post-op course, and adjusted if medically needed.  A medical consultation from the Hospitalist service was available for post-operative medical co-management.    Lab work ordering/results was followed by the Medical/Surgical team.  The patient had a clean appearing surgical incision with no sign of surgical site infections.  Discharge instructions were delineated in the Discharge Plan and reviewed with the patient.  All medications were listed in the medication reconciliation document, and morales points were reviewed with the patient.  The patient was deemed stable for discharge today (date).  Upon discharge, the patient will following up with Dr KENDALL Elizondo whose office number may be found on the discharge instructions.    67 year old female presented to [facility] with complaint of abdominal pain.  Abdominal ultrasound revealed cholelithiasis with evidence of acute cholecystitis.  Patient underwent laparoscopic cholecystectomy with JER drain placement.  The patient stayed overnight for monitoring and pain control.  Patient progressed well clinically, tolerating regular diet, utilizing incentive spirometer, voiding independently and ambulating.  Vital signs are encouraging and patient is cleared for discharge.

## 2022-10-18 NOTE — DISCHARGE NOTE PROVIDER - NSDCFUADDAPPT_GEN_ALL_CORE_FT
Please call to schedule appointment for thursday October 20, 2022 with Dr. Elizondo  Please call to schedule follow up for your lap band with Dr. khan in 2-3 weeks. All fluid removed from band prior to surgery.  Please call to schedule an appointment for Thursday, October 20, 2022 with Dr. Elizondo    Please call to schedule a follow up appointment for your lap band with Dr. khan in 2-3 weeks. All fluid removed from band prior to surgery.

## 2022-10-18 NOTE — DISCHARGE NOTE PROVIDER - PROVIDER TOKENS
PROVIDER:[TOKEN:[[id number]]] PROVIDER:[TOKEN:[[id number]]],PROVIDER:[TOKEN:[[id number]]]

## 2022-10-18 NOTE — PATIENT PROFILE ADULT - FALL HARM RISK - UNIVERSAL INTERVENTIONS
Bed in lowest position, wheels locked, appropriate side rails in place/Call bell, personal items and telephone in reach/Instruct patient to call for assistance before getting out of bed or chair/Non-slip footwear when patient is out of bed/[place] to call system/Physically safe environment - no spills, clutter or unnecessary equipment/Purposeful Proactive Rounding/Room/bathroom lighting operational, light cord in reach

## 2022-10-18 NOTE — DISCHARGE NOTE PROVIDER - CARE PROVIDER_API CALL
Steven Elizondo)  Surgery  [address] [place] [address], [address]  [place] [address]  Phone: [phone]  Fax: [phone]  Follow Up Time:    Steven Elizondo)  Surgery  [address] [facility], [address]  [place] [address]  Phone: [phone]  Fax: [phone]  Follow Up Time:     Wang Willis)  Surgery  [address] [place] [id number]  Phone: [phone]  Fax: [phone]  Follow Up Time:

## 2022-10-18 NOTE — ED ADULT NURSE NOTE - COMPLICATIONS
Surgery/procedure time and date: 7/7/2020 @ 1200 (estimated)                             Arrival time: 1000  (usually at least 1 hour prior to surgery/procedure)    1. Stop ALL solid food, gum, candy (including vitamins) 8 hours before surgery/procedure time.  2. Stop all CLOUDY liquids: coffee with creamer, formula, tube feeds, cloudy juices, non-human milk and breast milk with additives, 6 hours prior to surgery/procedure time.  3. Stop plain breast milk 4 hours prior to surgery/procedure time.  4. The patient should be ENCOURAGED to drink carbohydrate-rich clear liquids (sports drinks, clear juices) until 2 hours prior to surgery/procedure time.  5. CLEAR liquids include only water, black coffee NO creamer, clear oral rehydration drinks, clear sports drinks or clear fruit juices (no orange juice, no pulpy juices, no apple cider). Advise patients if they can read newsprint through the liquid, it qualifies as clear liquid.   6. IF IN DOUBT, drink water instead.   7. NOTHING TO EAT OR DRINK 2 hours before to surgery/procedure time. If you are told to take medication on the morning of surgery, it may be taken with a sip of water.     The team will call the week before (or the day before) surgery to tell you the arrival time.    The anesthesia preop center will call to ask you some questions before surgery.     Please stop aspirin 2 weeks before surgery, plavix 1 week before surgery, and other blood thinners 5 days before surgery, if indicated. Sometimes the anesthesia team or your doctors will want you stay on at least one blood thinner - they will let you know.     For your day of surgery, please [facility] to [facility] on the 2nd floor of the main hospital - follow the signs.    
None

## 2022-10-18 NOTE — ED ADULT NURSE REASSESSMENT NOTE - NS ED NURSE REASSESS COMMENT FT1
Pt awaiting surgical admission at this time - aware of the Plan Of care - all of the pts clothes and belongings were given to her son

## 2022-10-18 NOTE — H&P ADULT - ASSESSMENT
Patient had an ultrasound and a CAT scan done 4 days ago.  Acute cholecystitis to OR Dr. KENDALL Elizondo  NPO  Pain Meds  IV ABX  IVF  Drain fluid from lap band prior to surgery  Dr. Willis consulted

## 2022-10-18 NOTE — DISCHARGE NOTE PROVIDER - NSDCMRMEDTOKEN_GEN_ALL_CORE_FT
Aqueous Vitamin D: 1 application orally once a week  cefuroxime 500 mg oral tablet: 1 tab(s) orally 2 times a day   cephalexin 500 mg oral capsule: 1 cap(s) orally every 6 hours   hydroCHLOROthiazide 25 mg oral tablet: 1 tab(s) orally once a day  lactobacillus acidophilus oral capsule: 1 cap(s) orally 2 times a day   meloxicam 15 mg oral tablet: 1 tab(s) orally once a day   Aqueous Vitamin D: 1 application orally once a week  hydroCHLOROthiazide 25 mg oral tablet: 1 tab(s) orally once a day  meloxicam 15 mg oral tablet: 1 tab(s) orally once a day  Percocet 5 mg-325 mg oral tablet: 1 tab(s) orally every 6 hours, As Needed  Please follow the instructions on the prescription- sent in by Dr. Elizondo

## 2022-10-19 ENCOUNTER — NON-APPOINTMENT (OUTPATIENT)
Age: 67
End: 2022-10-19

## 2022-10-19 ENCOUNTER — TRANSCRIPTION ENCOUNTER (OUTPATIENT)
Age: 67
End: 2022-10-19

## 2022-10-19 VITALS
DIASTOLIC BLOOD PRESSURE: 66 MMHG | RESPIRATION RATE: 18 BRPM | TEMPERATURE: 99 F | HEART RATE: 68 BPM | OXYGEN SATURATION: 96 % | SYSTOLIC BLOOD PRESSURE: 109 MMHG

## 2022-10-19 LAB
ALBUMIN SERPL ELPH-MCNC: 3.3 G/DL — SIGNIFICANT CHANGE UP (ref 3.3–5)
ALP SERPL-CCNC: 102 U/L — SIGNIFICANT CHANGE UP (ref 30–120)
ALT FLD-CCNC: 62 U/L DA — HIGH (ref 10–60)
ANION GAP SERPL CALC-SCNC: 7 MMOL/L — SIGNIFICANT CHANGE UP (ref 5–17)
AST SERPL-CCNC: 58 U/L — HIGH (ref 10–40)
BILIRUB SERPL-MCNC: 0.3 MG/DL — SIGNIFICANT CHANGE UP (ref 0.2–1.2)
BUN SERPL-MCNC: 6 MG/DL — LOW (ref 7–23)
CALCIUM SERPL-MCNC: 8.9 MG/DL — SIGNIFICANT CHANGE UP (ref 8.4–10.5)
CHLORIDE SERPL-SCNC: 105 MMOL/L — SIGNIFICANT CHANGE UP (ref 96–108)
CO2 SERPL-SCNC: 29 MMOL/L — SIGNIFICANT CHANGE UP (ref 22–31)
CREAT SERPL-MCNC: 0.56 MG/DL — SIGNIFICANT CHANGE UP (ref 0.5–1.3)
EGFR: 99 ML/MIN/1.73M2 — SIGNIFICANT CHANGE UP
GLUCOSE SERPL-MCNC: 97 MG/DL — SIGNIFICANT CHANGE UP (ref 70–99)
HCT VFR BLD CALC: 39.6 % — SIGNIFICANT CHANGE UP (ref 34.5–45)
HGB BLD-MCNC: 12.5 G/DL — SIGNIFICANT CHANGE UP (ref 11.5–15.5)
MCHC RBC-ENTMCNC: 29.8 PG — SIGNIFICANT CHANGE UP (ref 27–34)
MCHC RBC-ENTMCNC: 31.6 GM/DL — LOW (ref 32–36)
MCV RBC AUTO: 94.5 FL — SIGNIFICANT CHANGE UP (ref 80–100)
NRBC # BLD: 0 /100 WBCS — SIGNIFICANT CHANGE UP (ref 0–0)
PLATELET # BLD AUTO: 269 K/UL — SIGNIFICANT CHANGE UP (ref 150–400)
POTASSIUM SERPL-MCNC: 4.4 MMOL/L — SIGNIFICANT CHANGE UP (ref 3.5–5.3)
POTASSIUM SERPL-SCNC: 4.4 MMOL/L — SIGNIFICANT CHANGE UP (ref 3.5–5.3)
PROT SERPL-MCNC: 7.2 G/DL — SIGNIFICANT CHANGE UP (ref 6–8.3)
RBC # BLD: 4.19 M/UL — SIGNIFICANT CHANGE UP (ref 3.8–5.2)
RBC # FLD: 13.7 % — SIGNIFICANT CHANGE UP (ref 10.3–14.5)
SODIUM SERPL-SCNC: 141 MMOL/L — SIGNIFICANT CHANGE UP (ref 135–145)
WBC # BLD: 12.24 K/UL — HIGH (ref 3.8–10.5)
WBC # FLD AUTO: 12.24 K/UL — HIGH (ref 3.8–10.5)

## 2022-10-19 PROCEDURE — 85025 COMPLETE CBC W/AUTO DIFF WBC: CPT

## 2022-10-19 PROCEDURE — 80053 COMPREHEN METABOLIC PANEL: CPT

## 2022-10-19 PROCEDURE — 86901 BLOOD TYPING SEROLOGIC RH(D): CPT

## 2022-10-19 PROCEDURE — 71045 X-RAY EXAM CHEST 1 VIEW: CPT

## 2022-10-19 PROCEDURE — 87635 SARS-COV-2 COVID-19 AMP PRB: CPT

## 2022-10-19 PROCEDURE — 99285 EMERGENCY DEPT VISIT HI MDM: CPT | Mod: 25

## 2022-10-19 PROCEDURE — 93005 ELECTROCARDIOGRAM TRACING: CPT

## 2022-10-19 PROCEDURE — 85730 THROMBOPLASTIN TIME PARTIAL: CPT

## 2022-10-19 PROCEDURE — C1889: CPT

## 2022-10-19 PROCEDURE — 76705 ECHO EXAM OF ABDOMEN: CPT

## 2022-10-19 PROCEDURE — C9399: CPT

## 2022-10-19 PROCEDURE — 99233 SBSQ HOSP IP/OBS HIGH 50: CPT

## 2022-10-19 PROCEDURE — 96374 THER/PROPH/DIAG INJ IV PUSH: CPT

## 2022-10-19 PROCEDURE — 85610 PROTHROMBIN TIME: CPT

## 2022-10-19 PROCEDURE — 88304 TISSUE EXAM BY PATHOLOGIST: CPT

## 2022-10-19 PROCEDURE — 85027 COMPLETE CBC AUTOMATED: CPT

## 2022-10-19 PROCEDURE — 84132 ASSAY OF SERUM POTASSIUM: CPT

## 2022-10-19 PROCEDURE — 86850 RBC ANTIBODY SCREEN: CPT

## 2022-10-19 PROCEDURE — 86900 BLOOD TYPING SEROLOGIC ABO: CPT

## 2022-10-19 PROCEDURE — 83690 ASSAY OF LIPASE: CPT

## 2022-10-19 PROCEDURE — 36415 COLL VENOUS BLD VENIPUNCTURE: CPT

## 2022-10-19 RX ORDER — ENOXAPARIN SODIUM 100 MG/ML
40 INJECTION SUBCUTANEOUS ONCE
Refills: 0 | Status: COMPLETED | OUTPATIENT
Start: 2022-10-19 | End: 2022-10-19

## 2022-10-19 RX ADMIN — Medication 1000 MILLIGRAM(S): at 06:07

## 2022-10-19 RX ADMIN — MORPHINE SULFATE 2 MILLIGRAM(S): 50 CAPSULE, EXTENDED RELEASE ORAL at 00:32

## 2022-10-19 RX ADMIN — Medication 1000 MILLIGRAM(S): at 06:13

## 2022-10-19 RX ADMIN — ENOXAPARIN SODIUM 40 MILLIGRAM(S): 100 INJECTION SUBCUTANEOUS at 09:34

## 2022-10-19 RX ADMIN — MORPHINE SULFATE 2 MILLIGRAM(S): 50 CAPSULE, EXTENDED RELEASE ORAL at 04:15

## 2022-10-19 RX ADMIN — MORPHINE SULFATE 2 MILLIGRAM(S): 50 CAPSULE, EXTENDED RELEASE ORAL at 00:14

## 2022-10-19 RX ADMIN — SODIUM CHLORIDE 125 MILLILITER(S): 9 INJECTION INTRAMUSCULAR; INTRAVENOUS; SUBCUTANEOUS at 06:11

## 2022-10-19 NOTE — PROGRESS NOTE ADULT - ASSESSMENT
The patient is a 67 year old female with a history of HTN, JOSE on CPAP, asthma, chronic venous insufficiency who is admitted with cholecystitis.    Plan:  - ECG with no evidence of ischemia or infarction  - Recent echo reportedly normal as per patient  - Leg swelling - long-standing issue due to chronic venous insufficiency  - Resume HCTZ and valsartan on discharge  - Surgery follow-up

## 2022-10-19 NOTE — DISCHARGE NOTE NURSING/CASE MANAGEMENT/SOCIAL WORK - NSDCFUADDAPPT_GEN_ALL_CORE_FT
Please call to schedule an appointment for Thursday, October 20, 2022 with Dr. Elizondo    Please call to schedule a follow up appointment for your lap band with Dr. khan in 2-3 weeks. All fluid removed from band prior to surgery.

## 2022-10-19 NOTE — PROGRESS NOTE ADULT - ASSESSMENT
68 yo Fi with hx of HTN, admitted to surgery for acute cholecystitis. Medical consulted for perioperative medical management    Acute cholecystitis  Patient is ASA class III . Ashton score  .5 percent for perioperative cardiac events. Patient is elevated risk for intermediate surgery  cleared by cardio  s/p [place], POD #1.  post op orders per surgical team  PT eval prn    Hx of HTN:  c/w home meds    Hx of Sleep Apnea  Recommend Pulm consult    dvt ppx: as per surgery

## 2022-10-19 NOTE — DISCHARGE NOTE NURSING/CASE MANAGEMENT/SOCIAL WORK - NSDCPEFALRISK_GEN_ALL_CORE
For information on Fall & Injury Prevention, visit: [email or web address] OR  [email or web address] OR  https://www.cdc.gov/steadi/patient.html

## 2022-10-19 NOTE — DISCHARGE NOTE NURSING/CASE MANAGEMENT/SOCIAL WORK - PATIENT PORTAL LINK FT
You can access the FollowMyHealth Patient Portal offered by [facility] by registering at the following website: [email or web address]. By joining [facility] [facility] [facility]’s FollowMyHealth portal, you will also be able to view your health information using other applications (apps) compatible with our system.

## 2022-10-19 NOTE — PROGRESS NOTE ADULT - SUBJECTIVE AND OBJECTIVE BOX
Chief Complaint: Cholecystitis    Interval Events: Underwent surgery yesterday.    Review of Systems:  General: No fevers, chills, weight gain  Skin: No rashes, color changes  Cardiovascular: No chest pain, orthopnea  Respiratory: No shortness of breath, cough  Gastrointestinal: No nausea, abdominal pain  Genitourinary: No incontinence, pain with urination  Musculoskeletal: No pain, swelling, decreased range of motion  Neurological: No headache, weakness  Psychiatric: No depression, anxiety  Endocrine: No weight gain, increased thirst  All other systems are comprehensively negative.    Physical Exam:  Vitals:        Vital Signs Last 24 Hrs  T(C): 37 (19 Oct 2022 07:37), Max: 37 (19 Oct 2022 07:37)  T(F): 98.6 (19 Oct 2022 07:37), Max: 98.6 (19 Oct 2022 07:37)  HR: 68 (19 Oct 2022 07:37) (68 - 91)  BP: 109/66 (19 Oct 2022 07:37) (109/66 - 163/88)  BP(mean): --  RR: 18 (19 Oct 2022 07:37) (12 - 22)  SpO2: 96% (19 Oct 2022 07:37) (95% - 100%)  Parameters below as of 19 Oct 2022 07:37  Patient On (Oxygen Delivery Method): nasal cannula  O2 Flow (L/min): 2  General: NAD  HEENT: MMM  Neck: No JVD, no carotid bruit  Lungs: CTAB  CV: RRR, nl S1/S2, no M/R/G  Abdomen: S/NT/ND, +BS  Extremities: No LE edema, no cyanosis  Neuro: AAOx3, non-focal  Skin: No rash    Labs:                        12.5   12.24 )-----------( 269      ( 19 Oct 2022 08:26 )             39.6     10-19    141  |  105  |  6<L>  ----------------------------<  97  4.4   |  29  |  0.56    Ca    8.9      19 Oct 2022 08:26    TPro  7.2  /  Alb  3.3  /  TBili  0.3  /  DBili  x   /  AST  58<H>  /  ALT  62<H>  /  AlkPhos  102  10-19        PT/INR - ( 18 Oct 2022 05:29 )   PT: 12.3 sec;   INR: 1.07 ratio         PTT - ( 18 Oct 2022 05:29 )  PTT:35.4 sec  
SURGERY    67 year old female POD #1 s/p laparoscopic cholecystectomy    SUBJECTIVE:   Patient seen and examined this morning.  Tolerating regular breakfast.  Denies N/V.  Ambulating, utilizing incentive spirometer, voiding independently.    OBJECTIVE:   T(F): 98.6 (10-19-22 @ 07:37), Max: 98.6 (10-19-22 @ 07:37)  HR: 68 (10-19-22 @ 07:37) (68 - 91)  BP: 109/66 (10-19-22 @ 07:37) (109/66 - 163/88)  RR: 18 (10-19-22 @ 07:37) (12 - 22)  SpO2: 96% (10-19-22 @ 07:37) (95% - 100%)      I&O's Detail    18 Oct 2022 07:01  -  19 Oct 2022 07:00  --------------------------------------------------------  IN:    Lactated Ringers: 1225 mL    sodium chloride 0.9%: 500 mL  Total IN: 1725 mL    OUT:    Blood Loss (mL): 20 mL    Bulb (mL): 5 mL    Voided (mL): 1925 mL  Total OUT: 1950 mL    Total NET: -225 mL          Physical exam:  General: A+O x 3 in NAD  Chest: Equal chest expansion  Heart: Pulse regular  Abdomen: soft, obese, appropriate incisional tenderness, JER in place draining SS, dressing changed  Incision: clean, dry and intact, no erythema  Extremities: no edema noted, warm,  no calf tenderness     MEDICATIONS  (STANDING):  acetaminophen     Tablet .. 1000 milliGRAM(s) Oral every 8 hours  influenza  Vaccine (HIGH DOSE) 0.7 milliLiter(s) IntraMuscular once  sodium chloride 0.9%. 1000 milliLiter(s) (125 mL/Hr) IV Continuous <Continuous>    MEDICATIONS  (PRN):  morphine  - Injectable 2 milliGRAM(s) IV Push every 4 hours PRN Moderate Pain (4 - 6)  oxyCODONE    IR 5 milliGRAM(s) Oral every 4 hours PRN Moderate Pain (4 - 6)      LABS:                        12.5   12.24 )-----------( 269      ( 19 Oct 2022 08:26 )             39.6     10-18    x   |  x   |  x   ----------------------------<  x   4.8   |  x   |  x     Ca    9.2      18 Oct 2022 01:43    TPro  8.3  /  Alb  3.9  /  TBili  0.5  /  DBili  x   /  AST  40  /  ALT  19  /  AlkPhos  109  10-18    PT/INR - ( 18 Oct 2022 05:29 )   PT: 12.3 sec;   INR: 1.07 ratio         PTT - ( 18 Oct 2022 05:29 )  PTT:35.4 sec      Assessment  67 year old female POD #1 s/p laparoscopic cholecystectomy, progressing well clinically, tolerating regular diet  discharge planning  Case and plan D/W Dr. Elizondo    
Patient is a 67y old  Female who presents with a chief complaint of Abdominal pain (19 Oct 2022 09:00)      INTERVAL HPI/OVERNIGHT EVENTS:    no overnight events  tolerating regular diet  ambulating    MEDICATIONS  (STANDING):  acetaminophen     Tablet .. 1000 milliGRAM(s) Oral every 8 hours  influenza  Vaccine (HIGH DOSE) 0.7 milliLiter(s) IntraMuscular once  sodium chloride 0.9%. 1000 milliLiter(s) (125 mL/Hr) IV Continuous <Continuous>    MEDICATIONS  (PRN):  morphine  - Injectable 2 milliGRAM(s) IV Push every 4 hours PRN Moderate Pain (4 - 6)  oxyCODONE    IR 5 milliGRAM(s) Oral every 4 hours PRN Moderate Pain (4 - 6)      Allergies    aspirin (Unknown)    Intolerances        REVIEW OF SYSTEMS:  feeling better after procedure      Vital Signs Last 24 Hrs  T(C): 37 (19 Oct 2022 07:37), Max: 37 (19 Oct 2022 07:37)  T(F): 98.6 (19 Oct 2022 07:37), Max: 98.6 (19 Oct 2022 07:37)  HR: 68 (19 Oct 2022 07:37) (68 - 91)  BP: 109/66 (19 Oct 2022 07:37) (109/66 - 163/88)  BP(mean): --  RR: 18 (19 Oct 2022 07:37) (12 - 22)  SpO2: 96% (19 Oct 2022 07:37) (95% - 100%)    Parameters below as of 19 Oct 2022 07:37  Patient On (Oxygen Delivery Method): nasal cannula  O2 Flow (L/min): 2      PHYSICAL EXAM:  GENERAL: NAD, well-groomed, well-developed  HEAD:  Atraumatic, Normocephalic  EYES: EOMI, PERRLA, conjunctiva and sclera clear  ENMT: Moist mucous membranes, No lesions; No tonsillar erythema, exudates, or enlargement  NECK: Supple, No JVD, Normal thyroid  NERVOUS SYSTEM:  Alert & Oriented X3, no focal deficit  CHEST/LUNG: Clear to auscultation bilaterally; No rales, rhonchi, wheezing, or rubs  HEART: Regular rate and rhythm; No murmurs, rubs, or gallops  ABDOMEN: Soft, Nontender, Nondistended; Bowel sounds present  EXTREMITIES:  2+ Peripheral Pulses, No clubbing, cyanosis, or edema  LYMPH: No lymphadenopathy noted  SKIN: No rashes or lesions, incision intact    LABS:                        12.5   12.24 )-----------( 269      ( 19 Oct 2022 08:26 )             39.6     19 Oct 2022 08:26    141    |  105    |  6      ----------------------------<  97     4.4     |  29     |  0.56     Ca    8.9        19 Oct 2022 08:26    TPro  7.2    /  Alb  3.3    /  TBili  0.3    /  DBili  x      /  AST  58     /  ALT  62     /  AlkPhos  102    19 Oct 2022 08:26    PT/INR - ( 18 Oct 2022 05:29 )   PT: 12.3 sec;   INR: 1.07 ratio         PTT - ( 18 Oct 2022 05:29 )  PTT:35.4 sec    CAPILLARY BLOOD GLUCOSE          RADIOLOGY & ADDITIONAL TESTS:

## 2022-11-02 ENCOUNTER — INPATIENT (INPATIENT)
Facility: HOSPITAL | Age: 67
LOS: 3 days | Discharge: HOME | DRG: 391 | End: 2022-11-06
Attending: INTERNAL MEDICINE | Admitting: INTERNAL MEDICINE
Payer: COMMERCIAL

## 2022-11-02 VITALS — WEIGHT: 274.03 LBS | HEIGHT: 64 IN

## 2022-11-02 DIAGNOSIS — Z98.84 BARIATRIC SURGERY STATUS: Chronic | ICD-10-CM

## 2022-11-02 PROCEDURE — 99285 EMERGENCY DEPT VISIT HI MDM: CPT

## 2022-11-02 RX ORDER — SODIUM CHLORIDE 9 MG/ML
1000 INJECTION INTRAMUSCULAR; INTRAVENOUS; SUBCUTANEOUS ONCE
Refills: 0 | Status: COMPLETED | OUTPATIENT
Start: 2022-11-02 | End: 2022-11-02

## 2022-11-02 NOTE — ED ADULT TRIAGE NOTE - CHIEF COMPLAINT QUOTE
pt c/o severe abdominal pian, unable to urinate, constipation x 1.5 weeks. s/p Gallbladder surgery 10/18. denies cp/sob/n/v/d/f.

## 2022-11-03 DIAGNOSIS — K52.9 NONINFECTIVE GASTROENTERITIS AND COLITIS, UNSPECIFIED: ICD-10-CM

## 2022-11-03 LAB
ALBUMIN SERPL ELPH-MCNC: 4 G/DL — SIGNIFICANT CHANGE UP (ref 3.3–5)
ALP SERPL-CCNC: 116 U/L — SIGNIFICANT CHANGE UP (ref 40–120)
ALT FLD-CCNC: 31 U/L — SIGNIFICANT CHANGE UP (ref 12–78)
ANION GAP SERPL CALC-SCNC: 4 MMOL/L — LOW (ref 5–17)
APPEARANCE UR: CLEAR — SIGNIFICANT CHANGE UP
AST SERPL-CCNC: 36 U/L — SIGNIFICANT CHANGE UP (ref 15–37)
BASOPHILS # BLD AUTO: 0.04 K/UL — SIGNIFICANT CHANGE UP (ref 0–0.2)
BASOPHILS NFR BLD AUTO: 0.3 % — SIGNIFICANT CHANGE UP (ref 0–2)
BILIRUB SERPL-MCNC: 0.7 MG/DL — SIGNIFICANT CHANGE UP (ref 0.2–1.2)
BILIRUB UR-MCNC: NEGATIVE — SIGNIFICANT CHANGE UP
BUN SERPL-MCNC: 7 MG/DL — SIGNIFICANT CHANGE UP (ref 7–23)
CALCIUM SERPL-MCNC: 10.1 MG/DL — SIGNIFICANT CHANGE UP (ref 8.5–10.1)
CHLORIDE SERPL-SCNC: 100 MMOL/L — SIGNIFICANT CHANGE UP (ref 96–108)
CO2 SERPL-SCNC: 29 MMOL/L — SIGNIFICANT CHANGE UP (ref 22–31)
COLOR SPEC: YELLOW — SIGNIFICANT CHANGE UP
CREAT SERPL-MCNC: 0.56 MG/DL — SIGNIFICANT CHANGE UP (ref 0.5–1.3)
DIFF PNL FLD: ABNORMAL
EGFR: 99 ML/MIN/1.73M2 — SIGNIFICANT CHANGE UP
EOSINOPHIL # BLD AUTO: 0.06 K/UL — SIGNIFICANT CHANGE UP (ref 0–0.5)
EOSINOPHIL NFR BLD AUTO: 0.4 % — SIGNIFICANT CHANGE UP (ref 0–6)
FLUAV AG NPH QL: SIGNIFICANT CHANGE UP
FLUBV AG NPH QL: SIGNIFICANT CHANGE UP
GLUCOSE SERPL-MCNC: 122 MG/DL — HIGH (ref 70–99)
GLUCOSE UR QL: NEGATIVE — SIGNIFICANT CHANGE UP
HCT VFR BLD CALC: 43.3 % — SIGNIFICANT CHANGE UP (ref 34.5–45)
HGB BLD-MCNC: 14.3 G/DL — SIGNIFICANT CHANGE UP (ref 11.5–15.5)
IMM GRANULOCYTES NFR BLD AUTO: 0.2 % — SIGNIFICANT CHANGE UP (ref 0–0.9)
KETONES UR-MCNC: NEGATIVE — SIGNIFICANT CHANGE UP
LACTATE SERPL-SCNC: 1.1 MMOL/L — SIGNIFICANT CHANGE UP (ref 0.7–2)
LEUKOCYTE ESTERASE UR-ACNC: NEGATIVE — SIGNIFICANT CHANGE UP
LIDOCAIN IGE QN: 49 U/L — LOW (ref 73–393)
LYMPHOCYTES # BLD AUTO: 1.58 K/UL — SIGNIFICANT CHANGE UP (ref 1–3.3)
LYMPHOCYTES # BLD AUTO: 11.5 % — LOW (ref 13–44)
MCHC RBC-ENTMCNC: 29.7 PG — SIGNIFICANT CHANGE UP (ref 27–34)
MCHC RBC-ENTMCNC: 33 GM/DL — SIGNIFICANT CHANGE UP (ref 32–36)
MCV RBC AUTO: 90 FL — SIGNIFICANT CHANGE UP (ref 80–100)
MONOCYTES # BLD AUTO: 0.87 K/UL — SIGNIFICANT CHANGE UP (ref 0–0.9)
MONOCYTES NFR BLD AUTO: 6.3 % — SIGNIFICANT CHANGE UP (ref 2–14)
NEUTROPHILS # BLD AUTO: 11.18 K/UL — HIGH (ref 1.8–7.4)
NEUTROPHILS NFR BLD AUTO: 81.3 % — HIGH (ref 43–77)
NITRITE UR-MCNC: NEGATIVE — SIGNIFICANT CHANGE UP
PH UR: 7 — SIGNIFICANT CHANGE UP (ref 5–8)
PLATELET # BLD AUTO: 372 K/UL — SIGNIFICANT CHANGE UP (ref 150–400)
POTASSIUM SERPL-MCNC: 5 MMOL/L — SIGNIFICANT CHANGE UP (ref 3.5–5.3)
POTASSIUM SERPL-SCNC: 5 MMOL/L — SIGNIFICANT CHANGE UP (ref 3.5–5.3)
PROT SERPL-MCNC: 8.9 GM/DL — HIGH (ref 6–8.3)
PROT UR-MCNC: NEGATIVE — SIGNIFICANT CHANGE UP
RBC # BLD: 4.81 M/UL — SIGNIFICANT CHANGE UP (ref 3.8–5.2)
RBC # FLD: 13.6 % — SIGNIFICANT CHANGE UP (ref 10.3–14.5)
RSV RNA NPH QL NAA+NON-PROBE: SIGNIFICANT CHANGE UP
SARS-COV-2 RNA SPEC QL NAA+PROBE: SIGNIFICANT CHANGE UP
SODIUM SERPL-SCNC: 133 MMOL/L — LOW (ref 135–145)
SP GR SPEC: 1.01 — SIGNIFICANT CHANGE UP (ref 1.01–1.02)
UROBILINOGEN FLD QL: NEGATIVE — SIGNIFICANT CHANGE UP
WBC # BLD: 13.76 K/UL — HIGH (ref 3.8–10.5)
WBC # FLD AUTO: 13.76 K/UL — HIGH (ref 3.8–10.5)

## 2022-11-03 PROCEDURE — 99223 1ST HOSP IP/OBS HIGH 75: CPT

## 2022-11-03 PROCEDURE — 99232 SBSQ HOSP IP/OBS MODERATE 35: CPT

## 2022-11-03 PROCEDURE — 97163 PT EVAL HIGH COMPLEX 45 MIN: CPT | Mod: GP

## 2022-11-03 PROCEDURE — 90662 IIV NO PRSV INCREASED AG IM: CPT

## 2022-11-03 PROCEDURE — 36415 COLL VENOUS BLD VENIPUNCTURE: CPT

## 2022-11-03 PROCEDURE — 85027 COMPLETE CBC AUTOMATED: CPT

## 2022-11-03 PROCEDURE — 74177 CT ABD & PELVIS W/CONTRAST: CPT | Mod: 26,MA

## 2022-11-03 PROCEDURE — 80048 BASIC METABOLIC PNL TOTAL CA: CPT

## 2022-11-03 PROCEDURE — 93010 ELECTROCARDIOGRAM REPORT: CPT

## 2022-11-03 PROCEDURE — 97116 GAIT TRAINING THERAPY: CPT | Mod: GP

## 2022-11-03 RX ORDER — INFLUENZA VIRUS VACCINE 15; 15; 15; 15 UG/.5ML; UG/.5ML; UG/.5ML; UG/.5ML
0.7 SUSPENSION INTRAMUSCULAR ONCE
Refills: 0 | Status: COMPLETED | OUTPATIENT
Start: 2022-11-03 | End: 2022-11-06

## 2022-11-03 RX ORDER — MELOXICAM 15 MG/1
1 TABLET ORAL
Qty: 0 | Refills: 0 | DISCHARGE

## 2022-11-03 RX ORDER — ENOXAPARIN SODIUM 100 MG/ML
40 INJECTION SUBCUTANEOUS EVERY 12 HOURS
Refills: 0 | Status: DISCONTINUED | OUTPATIENT
Start: 2022-11-03 | End: 2022-11-06

## 2022-11-03 RX ORDER — OXYCODONE AND ACETAMINOPHEN 5; 325 MG/1; MG/1
1 TABLET ORAL
Qty: 0 | Refills: 0 | DISCHARGE

## 2022-11-03 RX ORDER — CIPROFLOXACIN LACTATE 400MG/40ML
400 VIAL (ML) INTRAVENOUS ONCE
Refills: 0 | Status: COMPLETED | OUTPATIENT
Start: 2022-11-03 | End: 2022-11-03

## 2022-11-03 RX ORDER — ACETAMINOPHEN 500 MG
650 TABLET ORAL EVERY 6 HOURS
Refills: 0 | Status: DISCONTINUED | OUTPATIENT
Start: 2022-11-03 | End: 2022-11-06

## 2022-11-03 RX ORDER — METRONIDAZOLE 500 MG
500 TABLET ORAL ONCE
Refills: 0 | Status: COMPLETED | OUTPATIENT
Start: 2022-11-03 | End: 2022-11-03

## 2022-11-03 RX ORDER — TRIAMTERENE/HYDROCHLOROTHIAZID 75 MG-50MG
1 TABLET ORAL
Qty: 0 | Refills: 0 | DISCHARGE

## 2022-11-03 RX ORDER — ONDANSETRON 8 MG/1
4 TABLET, FILM COATED ORAL EVERY 8 HOURS
Refills: 0 | Status: DISCONTINUED | OUTPATIENT
Start: 2022-11-03 | End: 2022-11-06

## 2022-11-03 RX ORDER — SOD SULF/SODIUM/NAHCO3/KCL/PEG
4000 SOLUTION, RECONSTITUTED, ORAL ORAL ONCE
Refills: 0 | Status: COMPLETED | OUTPATIENT
Start: 2022-11-03 | End: 2022-11-03

## 2022-11-03 RX ORDER — PSYLLIUM SEED (WITH DEXTROSE)
3.4 POWDER (GRAM) ORAL
Qty: 0 | Refills: 0 | DISCHARGE

## 2022-11-03 RX ORDER — PIPERACILLIN AND TAZOBACTAM 4; .5 G/20ML; G/20ML
3.38 INJECTION, POWDER, LYOPHILIZED, FOR SOLUTION INTRAVENOUS EVERY 8 HOURS
Refills: 0 | Status: DISCONTINUED | OUTPATIENT
Start: 2022-11-03 | End: 2022-11-06

## 2022-11-03 RX ORDER — CHOLECALCIFEROL (VITAMIN D3) 125 MCG
1 CAPSULE ORAL
Qty: 0 | Refills: 0 | DISCHARGE

## 2022-11-03 RX ORDER — LANOLIN ALCOHOL/MO/W.PET/CERES
3 CREAM (GRAM) TOPICAL AT BEDTIME
Refills: 0 | Status: DISCONTINUED | OUTPATIENT
Start: 2022-11-03 | End: 2022-11-06

## 2022-11-03 RX ORDER — TRIAMTERENE/HYDROCHLOROTHIAZID 75 MG-50MG
1 TABLET ORAL DAILY
Refills: 0 | Status: DISCONTINUED | OUTPATIENT
Start: 2022-11-03 | End: 2022-11-06

## 2022-11-03 RX ADMIN — ONDANSETRON 4 MILLIGRAM(S): 8 TABLET, FILM COATED ORAL at 18:03

## 2022-11-03 RX ADMIN — PIPERACILLIN AND TAZOBACTAM 25 GRAM(S): 4; .5 INJECTION, POWDER, LYOPHILIZED, FOR SOLUTION INTRAVENOUS at 15:03

## 2022-11-03 RX ADMIN — Medication 650 MILLIGRAM(S): at 18:59

## 2022-11-03 RX ADMIN — Medication 200 MILLIGRAM(S): at 06:20

## 2022-11-03 RX ADMIN — SODIUM CHLORIDE 1000 MILLILITER(S): 9 INJECTION INTRAMUSCULAR; INTRAVENOUS; SUBCUTANEOUS at 01:25

## 2022-11-03 RX ADMIN — Medication 4000 MILLILITER(S): at 13:09

## 2022-11-03 RX ADMIN — Medication 650 MILLIGRAM(S): at 18:16

## 2022-11-03 RX ADMIN — PIPERACILLIN AND TAZOBACTAM 25 GRAM(S): 4; .5 INJECTION, POWDER, LYOPHILIZED, FOR SOLUTION INTRAVENOUS at 22:53

## 2022-11-03 RX ADMIN — Medication 10 MILLIGRAM(S): at 22:37

## 2022-11-03 RX ADMIN — Medication 100 MILLIGRAM(S): at 08:18

## 2022-11-03 RX ADMIN — ENOXAPARIN SODIUM 40 MILLIGRAM(S): 100 INJECTION SUBCUTANEOUS at 12:54

## 2022-11-03 RX ADMIN — Medication 1 TABLET(S): at 12:54

## 2022-11-03 RX ADMIN — ENOXAPARIN SODIUM 40 MILLIGRAM(S): 100 INJECTION SUBCUTANEOUS at 22:53

## 2022-11-03 NOTE — ED PROVIDER NOTE - PROGRESS NOTE DETAILS
Daja [place]: Patient evaluated by surgery.  No surgical intervention and.  Feel this is all due to constipation and recommending enema.  No hard stool on digital rectal exam for manual disimpaction.  Will admit and give enema.  Signout given to Dr. Cristobal.  JOIE

## 2022-11-03 NOTE — H&P ADULT - NSHPLABSRESULTS_GEN_ALL_CORE
14.3   13.76 )-----------( 372      ( 03 Nov 2022 01:13 )             43.3   11-03    133<L>  |  100  |  7   ----------------------------<  122<H>  5.0   |  29  |  0.56    Ca    10.1      03 Nov 2022 01:13    TPro  8.9<H>  /  Alb  4.0  /  TBili  0.7  /  DBili  x   /  AST  36  /  ALT  31  /  AlkPhos  116  11-03      CT Abdomen and Pelvis w/ IV Cont (11.03.22 @ 03:16) >    BOWEL: The rectum is distended with stool measuring 6.8 cm. There is mild   associated wall thickening. Fluid is seen distending the remainder of the   colon. Appendix is not visualized. There are no pericecal inflammatory   changes. Gastric lap band is noted. The port is seen within the left   anterior abdominal wall.  PERITONEUM: No ascites.  VESSELS: Within normal limits.  RETROPERITONEUM/LYMPH NODES: No lymphadenopathy.  ABDOMINAL WALL: Postsurgical changes. Skin thickening and subcutaneous   fat stranding seen along the anterior abdominal pannus. There is a small   fat-containing ventral hernia in the supraumbilical region, just right of   midline. Presumed injection granulomata are seen along the posterior   subcutaneous fat.  BONES: Degenerative changes.    IMPRESSION:  Question of fecal impaction and associated stercoral proctitis. There is   distention of the remainder of the colon, suspicious for colonic ileus.    Interval cholecystectomy changes. Small amount of fluid seen within the   gallbladder fossa. While this can be a normal finding postoperatively,   possibility of small bilateral are abscess not excluded. Correlate   clinically.

## 2022-11-03 NOTE — CONSULT NOTE ADULT - ASSESSMENT
67-year-old female who recently underwent lap cholecystectomy 2 weeks ago, presented with fecal impaction with possible sterco proctitis. Admit using Oxycodone for pain control.    Recommendations:

## 2022-11-03 NOTE — ED ADULT NURSE REASSESSMENT NOTE - NS ED NURSE REASSESS COMMENT FT1
Pt resting comfortably in bed, c/o abdominal pain- Dr. Farnsworth made aware she states she will try to disimpact patient at this time, vital signs stable, will reassess.

## 2022-11-03 NOTE — H&P ADULT - ASSESSMENT
* Well severe constipation after narcs and sedentary life style due to morbid obesity  manual disimpaction unsuccessful- stool sliding back up in the rectum  consult surgery to see if it safe to start colon prep  Zosyn  prn analgesia with IV Tylenol  no narc if possible    * Small amount of fluid seen within the gallbladder fossa. While this can be a normal finding postoperatively, possibility of small bilateral are abscess not excluded  surgery to comment    * HTN resume Dyazide    * Well severe constipation after narcs and sedentary life style due to morbid obesity  manual disimpaction unsuccessful- stool sliding back up in the rectum  consult surgery to see if it safe to start colon prep  Zosyn  prn analgesia with IV Tylenol  no narc if possible    * Small amount of fluid seen within the gallbladder fossa. While this can be a normal finding postoperatively, possibility of small bilateral are abscess not excluded  surgery to comment    * HTN resume Dyazide     Crusted Zoster

## 2022-11-03 NOTE — H&P ADULT - HISTORY OF PRESENT ILLNESS
67-year-old female status post cholecystectomy 2 weeks ago at outside hospital presents with abdominal pain and urinary retention.  Patient states she has not had a bowel movement since surgery.  Developed urinary retention yesterday.  Does feel her abdomen is distended.  Does admit to using pain medication postoperatively, oxycodone.   Manual disimpaction attempted in ED: large amount of soft stool in the vault but sliding up the rectum minimal amount extracted. Pt very uncomfortable, unable to concentrate on discussion. Will reeval shortly. Plan for [facility]

## 2022-11-03 NOTE — ED PROVIDER NOTE - CLINICAL SUMMARY MEDICAL DECISION MAKING FREE TEXT BOX
67-year-old female with constipation and recent surgery.  Rule out SBO.  Urinary retention as well Morse to be inserted check UA U culture

## 2022-11-03 NOTE — ED PROVIDER NOTE - OBJECTIVE STATEMENT
67-year-old female status post cholecystectomy 2 weeks ago at outside hospital presents with abdominal pain and urinary retention.  Patient states she has not had a bowel movement since surgery.  Developed urinary retention yesterday.  Does feel her abdomen is distended.  Does admit to using pain medication postoperatively, oxycodone.  No fevers.

## 2022-11-03 NOTE — CONSULT NOTE ADULT - TIME BILLING
Patient seen and examined, chart including vitals, labs, and imaging all reviewed.  Ms. Rothman recently underwent cholecystectomy and states that she has not had a bowel movment since surgery.  On exam her abdomen is soft, mildly distended, mildly tender in lower abdomen without rebound/guarding.  She is hemodynamically stable.  Labs with mild leukocytosis, hyponatremia noted, no lactic acidosis.  CT shows stool burden in distal colon with fluid-dilated loops of colon proximally.      -- Disimpaction previously attempted but minimal yield  -- Advise scheduled enemas and Dulcolax suppositories  -- Minimize narcotics  -- Encourage ambulation  -- No plans for operative intervention at this time  -- Monitor abdominal exam

## 2022-11-03 NOTE — H&P ADULT - NSHPPHYSICALEXAM_GEN_ALL_CORE
Vital Signs Last 24 Hrs  T(C): 37.4 (03 Nov 2022 06:26), Max: 37.4 (03 Nov 2022 06:26)  T(F): 99.4 (03 Nov 2022 06:26), Max: 99.4 (03 Nov 2022 06:26)  HR: 71 (03 Nov 2022 06:26) (71 - 102)  BP: 134/74 (03 Nov 2022 06:26) (124/64 - 136/87)  BP(mean): 101 (03 Nov 2022 00:13) (101 - 101)  RR: 15 (03 Nov 2022 06:26) (15 - 18)  SpO2: 100% (03 Nov 2022 06:26) (96% - 100%)    Parameters below as of 03 Nov 2022 06:26  Patient On (Oxygen Delivery Method): room air    PHYSICAL EXAM:      Constitutional: uncomfortable  Eyes: perrl, no conjunctival changes  ENMT: no exudates, moist oral muc, uvula midline  Neck: no JVD, no LAD  Back: no cva tenderness  Respiratory: CTA, no exp wheezes  Cardiovascular: S1S2 reg, no murmur gallop or rub  Gastrointestinal: abd soft but dustended, mild diffuse tenderness, decreased  BS  Genitourinary: Morse  Extremities: FROM, no joint effusions, no edema, no clubbing , no cyanosis  Vascular: pedal pulses + bilateral, warm extremities  Neurological: non focal, mot str 5/5/ all extr  Skin: no rashes  Lymph Nodes: no LAD  Rectal: large amount relatively soft stool in the vault Vital Signs Last 24 Hrs  T(C): 37.4 (03 Nov 2022 06:26), Max: 37.4 (03 Nov 2022 06:26)  T(F): 99.4 (03 Nov 2022 06:26), Max: 99.4 (03 Nov 2022 06:26)  HR: 71 (03 Nov 2022 06:26) (71 - 102)  BP: 134/74 (03 Nov 2022 06:26) (124/64 - 136/87)  BP(mean): 101 (03 Nov 2022 00:13) (101 - 101)  RR: 15 (03 Nov 2022 06:26) (15 - 18)  SpO2: 100% (03 Nov 2022 06:26) (96% - 100%)    Parameters below as of 03 Nov 2022 06:26  Patient On (Oxygen Delivery Method): room air    PHYSICAL EXAM:      Constitutional: uncomfortable  Eyes: perrl, no conjunctival changes  ENMT: no exudates, moist oral muc, uvula midline  Neck: no JVD, no LAD  Back: no cva tenderness  Respiratory: CTA, no exp wheezes  Cardiovascular: S1S2 reg, no murmur gallop or rub  Gastrointestinal: abd soft but dustended, mild diffuse tenderness, decreased  BS  Genitourinary: Morse  Extremities: FROM, no joint effusions, no edema, no clubbing , no cyanosis  Vascular: pedal pulses + bilateral, warm extremities  Neurological: non focal, mot str 5/5/ all extr  Skin: right T11 dermatome rash HZV  Lymph Nodes: no LAD  Rectal: large amount relatively soft stool in the vault

## 2022-11-03 NOTE — PATIENT PROFILE ADULT - NSPROSPHOSPCHAPLAINYN_GEN_A_NUR
"Discharge Summary    Shahnaz Pugh MRN# [id number]   YOB: 1952 Age: 65 year old     Date of Admission: 10/25/2017    Date of Discharge: 10/26/2017    Reason for Admission: Shahnaz Pugh is an 65 year old female who was admitted to the hospital following surgery.    Preoperative Diagnosis: left hip djd    Postoperative Diagnosis: left hip djd    Procedure Completed:  Left total hip arthroplasty    Hospital Course:  Ms. Pugh was admitted and underwent the above procedure. The patient tolerated the procedure well. There were no complications. Following surgery she was admitted to the floor.  During her stay she did not require any blood transfusions. Her pain was controlled with oral pain medication.  During her stay she progressed well in physical therapy and all the therapy goals were met.     Discharge Physical Exam:  /67 (BP Location: Right arm)  Pulse 81  Temp 98  F (36.7  C) (Oral)  Resp 16  Ht 1.6 m (5' 3\")  Wt 66.2 kg (146 lb)  SpO2 97%  BMI 25.86 kg/m2  Neurovascularly intact, distal pulses present bilaterally.  Calves are negative bilaterally, both soft and nontender.    Assessment: Ms. Pugh is stable and doing well status post left total hip arthroplasty.    Plan: We will discharge her home on analgesics and deep venous thrombosis prophylaxis.  She will follow-up with me approximately 2 weeks from surgery.  She may call [phone] to schedule an appointment.    Meds:   Shahnaz Pugh   Home Medication Instructions [id number]    Printed on:10/30/17 0556   Medication Information                      Acetaminophen (TYLENOL PO)  Take 1,000 mg by mouth 2 times daily as needed for mild pain (Takes 2 x 500 mg = 1000 mg dose)             albuterol (PROAIR HFA, PROVENTIL HFA, VENTOLIN HFA) 108 (90 BASE) MCG/ACT inhaler  Inhale 2 puffs into the lungs every 6 hours as needed              BIOTIN PO  Take 5,000 mcg by mouth daily              ciclesonide (ALVESCO) 80 " MCG/ACT inhaler  Inhale 1 puff into the lungs 2 times daily             cyclobenzaprine (FLEXERIL) 5 MG tablet  Take 1 tablet (5 mg) by mouth 3 times daily as needed for muscle spasms             HYDROmorphone (DILAUDID) 2 MG tablet  Take 1-2 tablets (2-4 mg) by mouth every 3 hours as needed for moderate to severe pain             Ibuprofen (ADVIL PO)  Take 800 mg by mouth daily as needed for moderate pain             loratadine (CLARITIN) 10 MG tablet  Take 10 mg by mouth every morning              Misc Natural Products (TART CHERRY ADVANCED PO)  Take 1 tablet by mouth every morning              Montelukast Sodium (SINGULAIR PO)  Take 10 mg by mouth At Bedtime             Multiple Vitamins-Minerals (AIRBORNE GUMMIES PO)  Take 1 chew tab by mouth 3 times daily             multivitamin, therapeutic with minerals (MULTI-VITAMIN) TABS  Take 1 tablet by mouth daily              Omega-3 Fatty Acids (OMEGA-3 FISH OIL PO)  Take 1 g by mouth every morning              rivaroxaban ANTICOAGULANT (XARELTO) 10 MG TABS tablet  Take 1 tablet (10 mg) by mouth daily (with dinner)                no

## 2022-11-03 NOTE — ED PROVIDER NOTE - PHYSICAL EXAMINATION
***GEN - NAD; well appearing; A+O x3 ***HEAD - NC/AT ***EYES/NOSE - PERRL, EOMI, mucous membranes moist, no discharge ***THROAT: Oral cavity and pharynx normal. No inflammation, swelling, exudate, or lesions.  ***NECK: Neck supple  ***PULMONARY - CTA b/l, symmetric breath sounds. ***CARDIAC -s1s2, RRR, no M,G,R  ***ABDOMEN - +BS, +distention and ttp over middle abdominal area,, soft, no guarding, no rebound, no masses  ***RECTAL: soft, brown stool in rectal vault.   ***BACK - no CVA tenderness, Normal  spine ***EXTREMITIES - symmetric pulses, 2+ dp, capillary refill < 2 seconds, no clubbing, no cyanosis, no edema ***SKIN - no rash or bruising. healing surgical wounds over abdomen.   ***NEUROLOGIC - alert, CN 2-12 intact

## 2022-11-03 NOTE — CONSULT NOTE ADULT - SUBJECTIVE AND OBJECTIVE BOX
67-year-old female with PMHx of MO, Asthma, Sleep apnea, also s/p gastric band presented with abdominal pain, bloating and , started immediately post operation(lap cholecystectomy) on 10/18/22. Developed urinary retention yesterday. She was taking Oxycodone for pain management post op. Abdomen mildly tender to touch in all four quadrants. CT scan was pertinent for fecal impaction with associated stercoral proctitis.    Vitals:  T(C): 37.1 (11-03 @ 09:00), Max: 37.4 (11-03 @ 06:26)  HR: 80 (11-03 @ 09:00) (71 - 102)  BP: 130/74 (11-03 @ 09:00) (124/64 - 136/87)  RR: 16 (11-03 @ 09:00) (15 - 18)  SpO2: 99% (11-03 @ 09:00) (96% - 100%)      Physical Exam:  General: AAOx3, Well developed, NAD  Chest: Normal respiratory effort  Heart: RRR  Abdomen: distended, mildly tender to touch in all four quadrants.   Neuro/Psych: No localized deficits. Normal speech, normal tone  Skin: Normal, no rashes, no lesions noted.   Extremities: Warm, well perfused, no edema, Pulses intact    11-03 @ 01:13                    14.3  CBC: 13.76>)-------(<372                     43.3                 133 | 100 | 7    CMP:  ----------------------< 122               5.0 | 29 | 0.56                      Ca:10.1  Phos:-  Mg:-               0.7|      |36        LFTs:  ------|116|-----             -|      |-      Current Inpatient Medications:  acetaminophen     Tablet .. 650 milliGRAM(s) Oral every 6 hours PRN  aluminum hydroxide/magnesium hydroxide/simethicone Suspension 30 milliLiter(s) Oral every 4 hours PRN  enoxaparin Injectable 40 milliGRAM(s) SubCutaneous every 12 hours  melatonin 3 milliGRAM(s) Oral at bedtime PRN  ondansetron Injectable 4 milliGRAM(s) IV Push every 8 hours PRN  piperacillin/tazobactam IVPB.. 3.375 Gram(s) IV Intermittent every 8 hours  polyethylene glycol/electrolyte Solution. 4000 milliLiter(s) Oral once  triamterene 37.5 mG/hydrochlorothiazide 25 mG Tablet 1 Tablet(s) Oral daily

## 2022-11-03 NOTE — PATIENT PROFILE ADULT - FALL HARM RISK - HARM RISK INTERVENTIONS
Assistance with ambulation/Assistance OOB with selected safe patient handling equipment/Communicate Risk of Fall with Harm to all staff/Discuss with provider need for PT consult/Monitor gait and stability/Provide patient with walking aids - walker, cane, crutches/Reinforce activity limits and safety measures with patient and family/Review medications for side effects contributing to fall risk/Sit up slowly, dangle for a short time, stand at bedside before walking/Tailored Fall Risk Interventions/Toileting schedule using arm’s reach rule for commode and bathroom/Use of alarms - bed, chair and/or voice tab/Visual Cue: Yellow wristband and red socks/Bed in lowest position, wheels locked, appropriate side rails in place/Call bell, personal items and telephone in reach/Instruct patient to call for assistance before getting out of bed or chair/Non-slip footwear when patient is out of bed/[place] to call system/Physically safe environment - no spills, clutter or unnecessary equipment/Purposeful Proactive Rounding/Room/bathroom lighting operational, light cord in reach

## 2022-11-04 LAB
ANION GAP SERPL CALC-SCNC: 7 MMOL/L — SIGNIFICANT CHANGE UP (ref 5–17)
BUN SERPL-MCNC: 7 MG/DL — SIGNIFICANT CHANGE UP (ref 7–23)
CALCIUM SERPL-MCNC: 9.3 MG/DL — SIGNIFICANT CHANGE UP (ref 8.5–10.1)
CHLORIDE SERPL-SCNC: 102 MMOL/L — SIGNIFICANT CHANGE UP (ref 96–108)
CO2 SERPL-SCNC: 27 MMOL/L — SIGNIFICANT CHANGE UP (ref 22–31)
CREAT SERPL-MCNC: 0.48 MG/DL — LOW (ref 0.5–1.3)
CULTURE RESULTS: NO GROWTH — SIGNIFICANT CHANGE UP
EGFR: 103 ML/MIN/1.73M2 — SIGNIFICANT CHANGE UP
GLUCOSE SERPL-MCNC: 85 MG/DL — SIGNIFICANT CHANGE UP (ref 70–99)
HCT VFR BLD CALC: 40.7 % — SIGNIFICANT CHANGE UP (ref 34.5–45)
HGB BLD-MCNC: 13.3 G/DL — SIGNIFICANT CHANGE UP (ref 11.5–15.5)
MCHC RBC-ENTMCNC: 30.2 PG — SIGNIFICANT CHANGE UP (ref 27–34)
MCHC RBC-ENTMCNC: 32.7 GM/DL — SIGNIFICANT CHANGE UP (ref 32–36)
MCV RBC AUTO: 92.3 FL — SIGNIFICANT CHANGE UP (ref 80–100)
PLATELET # BLD AUTO: 334 K/UL — SIGNIFICANT CHANGE UP (ref 150–400)
POTASSIUM SERPL-MCNC: 3.5 MMOL/L — SIGNIFICANT CHANGE UP (ref 3.5–5.3)
POTASSIUM SERPL-SCNC: 3.5 MMOL/L — SIGNIFICANT CHANGE UP (ref 3.5–5.3)
RBC # BLD: 4.41 M/UL — SIGNIFICANT CHANGE UP (ref 3.8–5.2)
RBC # FLD: 13.5 % — SIGNIFICANT CHANGE UP (ref 10.3–14.5)
SODIUM SERPL-SCNC: 136 MMOL/L — SIGNIFICANT CHANGE UP (ref 135–145)
SPECIMEN SOURCE: SIGNIFICANT CHANGE UP
WBC # BLD: 13.05 K/UL — HIGH (ref 3.8–10.5)
WBC # FLD AUTO: 13.05 K/UL — HIGH (ref 3.8–10.5)

## 2022-11-04 PROCEDURE — 99232 SBSQ HOSP IP/OBS MODERATE 35: CPT

## 2022-11-04 PROCEDURE — 99233 SBSQ HOSP IP/OBS HIGH 50: CPT

## 2022-11-04 RX ADMIN — Medication 650 MILLIGRAM(S): at 18:43

## 2022-11-04 RX ADMIN — Medication 1 TABLET(S): at 10:39

## 2022-11-04 RX ADMIN — PIPERACILLIN AND TAZOBACTAM 25 GRAM(S): 4; .5 INJECTION, POWDER, LYOPHILIZED, FOR SOLUTION INTRAVENOUS at 05:49

## 2022-11-04 RX ADMIN — ENOXAPARIN SODIUM 40 MILLIGRAM(S): 100 INJECTION SUBCUTANEOUS at 10:38

## 2022-11-04 RX ADMIN — PIPERACILLIN AND TAZOBACTAM 25 GRAM(S): 4; .5 INJECTION, POWDER, LYOPHILIZED, FOR SOLUTION INTRAVENOUS at 14:47

## 2022-11-04 RX ADMIN — PIPERACILLIN AND TAZOBACTAM 25 GRAM(S): 4; .5 INJECTION, POWDER, LYOPHILIZED, FOR SOLUTION INTRAVENOUS at 22:04

## 2022-11-04 RX ADMIN — ENOXAPARIN SODIUM 40 MILLIGRAM(S): 100 INJECTION SUBCUTANEOUS at 22:40

## 2022-11-04 RX ADMIN — Medication 10 MILLIGRAM(S): at 22:03

## 2022-11-04 NOTE — DIETITIAN INITIAL EVALUATION ADULT - ADD RECOMMEND
1) Continue regular diet as tolerated  2) Add ensure + HP shake BID   3) Monitor bowel movements, if no BM for >3 days, consider implementing  bowel regimen.  4) MVI w/ minerals daily to ensure 100% RDA met  5) Consider adding thiamine 100 mg daily 2/2 poor PO intake/ malnutrition  6) Encourage protein-rich foods, maximize food preferences  7) Meal encouragement; tray set-up to increase po intake  RD will continue to monitor PO intake, labs, hydration, and wt prn.

## 2022-11-04 NOTE — DIETITIAN INITIAL EVALUATION ADULT - PERTINENT LABORATORY DATA
11-04    136  |  102  |  7   ----------------------------<  85  3.5   |  27  |  0.48<L>    Ca    9.3      04 Nov 2022 05:37    TPro  8.9<H>  /  Alb  4.0  /  TBili  0.7  /  DBili  x   /  AST  36  /  ALT  31  /  AlkPhos  116  11-03

## 2022-11-04 NOTE — DIETITIAN INITIAL EVALUATION ADULT - ORAL INTAKE PTA/DIET HISTORY
Pt lives at home w/ family; reports decreased appetite and consuming frequent small meals x 2 weeks 2/2 severe constipation, bloating, early satiety. Reports consuming a lot of soft foods at home (apple sauce, peaches, cream soups).

## 2022-11-04 NOTE — PROGRESS NOTE ADULT - TIME BILLING
Seen and examined.  Just had more BMs.  AFVSS  NAD  soft, mild tenderness, ND  A/P -   Encouraged pt to use commode/toilet to facilitate BMs, but patient declined.  Continue bowel regimen.  Reconsult as needed.

## 2022-11-04 NOTE — DIETITIAN INITIAL EVALUATION ADULT - OTHER INFO
67-year-old female status post cholecystectomy 2 weeks ago at outside hospital presents with abdominal pain and urinary retention.  Patient states she has not had a bowel movement since surgery.  Developed urinary retention yesterday.   Admit for stercoral proctitis     S/p cholecystectomy on 10/18/22. CT scan on 11/3/22 reveals fecal impaction with associated stercoral proctitis. Pt reports continued decreased appetite, consuming small meals and meeting <50% of ENN x 1 day (since admit). Reports UBW of 286# x 2 weeks ago; unable to obtain bed scale wt 2/2 no scale on ED bed. Admit wt of 269# doc'd on 11/4/22. Unintentional wt loss of 17# / 6.3% x  1 mo; severe & clinically significant. NFPE reveals moderate muscle wasting. Will trial ensure + HP shake BID - pt is receptive. See below for other recommendations.

## 2022-11-04 NOTE — PROGRESS NOTE ADULT - ASSESSMENT
67-year-old female who recently underwent lap cholecystectomy 2 weeks ago, presented with fecal impaction with possible sterocoral colitis and proctitis. Admit using Oxycodone for pain control.      Plan:    - Continue enemas and Dulcolax suppositories.  - Minimize narcotics.  - Encourage ambulation.  - Continue management per primary team.  - no plan for operative management.  - Colorectal surgery will sign off this patient, please reconsult again if needed.      Plan was discussed with Dr. Perez

## 2022-11-04 NOTE — DIETITIAN INITIAL EVALUATION ADULT - PERTINENT MEDS FT
MEDICATIONS  (STANDING):  bisacodyl Suppository 10 milliGRAM(s) Rectal at bedtime  enoxaparin Injectable 40 milliGRAM(s) SubCutaneous every 12 hours  influenza  Vaccine (HIGH DOSE) 0.7 milliLiter(s) IntraMuscular once  piperacillin/tazobactam IVPB.. 3.375 Gram(s) IV Intermittent every 8 hours  triamterene 37.5 mG/hydrochlorothiazide 25 mG Tablet 1 Tablet(s) Oral daily    MEDICATIONS  (PRN):  acetaminophen     Tablet .. 650 milliGRAM(s) Oral every 6 hours PRN Temp greater or equal to 38C (100.4F), Mild Pain (1 - 3)  aluminum hydroxide/magnesium hydroxide/simethicone Suspension 30 milliLiter(s) Oral every 4 hours PRN Dyspepsia  melatonin 3 milliGRAM(s) Oral at bedtime PRN Insomnia  ondansetron Injectable 4 milliGRAM(s) IV Push every 8 hours PRN Nausea and/or Vomiting

## 2022-11-04 NOTE — DIETITIAN INITIAL EVALUATION ADULT - NSFNSGIIOFT_GEN_A_CORE
I&O's Detail    03 Nov 2022 07:01  -  04 Nov 2022 07:00  --------------------------------------------------------  IN:    IV PiggyBack: 100 mL  Total IN: 100 mL    OUT:    Indwelling Catheter - Urethral (mL): 1950 mL  Total OUT: 1950 mL    Total NET: -1850 mL      04 Nov 2022 07:01  -  04 Nov 2022 13:46  --------------------------------------------------------  IN:  Total IN: 0 mL    OUT:    Indwelling Catheter - Urethral (mL): 1100 mL  Total OUT: 1100 mL    Total NET: -1100 mL

## 2022-11-05 ENCOUNTER — TRANSCRIPTION ENCOUNTER (OUTPATIENT)
Age: 67
End: 2022-11-05

## 2022-11-05 PROCEDURE — 99232 SBSQ HOSP IP/OBS MODERATE 35: CPT

## 2022-11-05 RX ADMIN — Medication 650 MILLIGRAM(S): at 09:45

## 2022-11-05 RX ADMIN — PIPERACILLIN AND TAZOBACTAM 25 GRAM(S): 4; .5 INJECTION, POWDER, LYOPHILIZED, FOR SOLUTION INTRAVENOUS at 13:08

## 2022-11-05 RX ADMIN — Medication 650 MILLIGRAM(S): at 08:53

## 2022-11-05 RX ADMIN — Medication 1 TABLET(S): at 09:41

## 2022-11-05 RX ADMIN — ENOXAPARIN SODIUM 40 MILLIGRAM(S): 100 INJECTION SUBCUTANEOUS at 21:53

## 2022-11-05 RX ADMIN — PIPERACILLIN AND TAZOBACTAM 25 GRAM(S): 4; .5 INJECTION, POWDER, LYOPHILIZED, FOR SOLUTION INTRAVENOUS at 05:41

## 2022-11-05 RX ADMIN — ENOXAPARIN SODIUM 40 MILLIGRAM(S): 100 INJECTION SUBCUTANEOUS at 09:41

## 2022-11-05 RX ADMIN — PIPERACILLIN AND TAZOBACTAM 25 GRAM(S): 4; .5 INJECTION, POWDER, LYOPHILIZED, FOR SOLUTION INTRAVENOUS at 21:53

## 2022-11-05 NOTE — PROGRESS NOTE ADULT - SUBJECTIVE AND OBJECTIVE BOX
67-year-old female status post cholecystectomy 2 weeks ago at outside hospital presents with abdominal pain and urinary retention.  Patient states she has not had a bowel movement since surgery.  Developed urinary retention yesterday.  Does feel her abdomen is distended.  Does admit to using pain medication postoperatively, oxycodone.   She now has multiple BM    Vital Signs Last 24 Hrs  T(C): 36.2 (05 Nov 2022 08:51), Max: 37.7 (04 Nov 2022 17:15)  T(F): 97.2 (05 Nov 2022 08:51), Max: 99.9 (04 Nov 2022 17:15)  HR: 91 (05 Nov 2022 08:51) (90 - 92)  BP: 114/57 (05 Nov 2022 08:51) (114/57 - 131/75)  BP(mean): --  RR: 19 (05 Nov 2022 08:51) (19 - 20)  SpO2: 97% (05 Nov 2022 08:51) (96% - 98%)    Parameters below as of 05 Nov 2022 08:51  Patient On (Oxygen Delivery Method): room air        PHYSICAL EXAM:      Constitutional: NAD  Eyes: perrl, no conjunctival changes  ENMT: no exudates, moist oral muc, uvula midline  Neck: no JVD, no LAD  Back: no cva tenderness  Respiratory: CTA, no exp wheezes  Cardiovascular: S1S2 reg, no murmur gallop or rub  Gastrointestinal: abd soft, NT/ND + BS  Genitourinary: voiding  Extremities: FROM, no joint effusions, no edema, no clubbing , no cyanosis  Vascular: pedal pulses + bilateral, warm extremities  Neurological: non focal, mot str 5/5/ all extr  Skin: no rashes  Lymph Nodes: no LAD                          13.3   13.05 )-----------( 334      ( 04 Nov 2022 05:37 )             40.7         11-04    136  |  102  |  7   ----------------------------<  85  3.5   |  27  |  0.48<L>    Ca    9.3      04 Nov 2022 05:37    TPro  8.9<H>  /  Alb  4.0  /  TBili  0.7  /  DBili  x   /  AST  36  /  ALT  31  /  AlkPhos  116  11-03    < from: CT Abdomen and Pelvis w/ IV Cont (11.03.22 @ 03:16) >  Question of fecal impaction and associated stercoral proctitis. There is   distention of the remainder of the colon, suspicious forcolonic ileus.    Interval cholecystectomy changes. Small amount of fluid seen within the   gallbladder fossa. While this can be a normal finding postoperatively,   possibility of small bilateral are abscess not excluded. Correlate   clinically.            * Resolvedsevere constipation after narcs and sedentary life style due to morbid obesity  resolved  Zosyn  prn analgesia with IV Tylenol  no narc if possible  dc Morse    * Small amount of fluid seen within the gallbladder fossa. While this can be a normal finding postoperatively, possibility of small bilateral are abscess not excluded  surgery was consulted not symptomatic    * HTN resume Dyazide     Crusted Zoster            
67-year-old female status post cholecystectomy 2 weeks ago at outside hospital presents with abdominal pain and urinary retention.  Patient states she has not had a bowel movement since surgery.  Developed urinary retention yesterday.  Does feel her abdomen is distended.  Does admit to using pain medication postoperatively, oxycodone.   She now has multiple BM    Vital Signs Last 24 Hrs  T(C): 37.4 (04 Nov 2022 10:35), Max: 37.6 (04 Nov 2022 05:39)  T(F): 99.4 (04 Nov 2022 10:35), Max: 99.7 (04 Nov 2022 05:39)  HR: 81 (04 Nov 2022 10:35) (81 - 104)  BP: 106/87 (04 Nov 2022 10:35) (106/87 - 155/82)  BP(mean): 101 (03 Nov 2022 18:05) (101 - 101)  RR: 18 (04 Nov 2022 10:35) (18 - 18)  SpO2: 95% (04 Nov 2022 10:35) (93% - 97%)    Parameters below as of 04 Nov 2022 10:35  Patient On (Oxygen Delivery Method): room air    PHYSICAL EXAM:      Constitutional: NAD  Eyes: perrl, no conjunctival changes  ENMT: no exudates, moist oral muc, uvula midline  Neck: no JVD, no LAD  Back: no cva tenderness  Respiratory: CTA, no exp wheezes  Cardiovascular: S1S2 reg, no murmur gallop or rub  Gastrointestinal: abd soft, NT/ND + BS  Genitourinary: voiding  Extremities: FROM, no joint effusions, no edema, no clubbing , no cyanosis  Vascular: pedal pulses + bilateral, warm extremities  Neurological: non focal, mot str 5/5/ all extr  Skin: no rashes  Lymph Nodes: no LAD                          13.3   13.05 )-----------( 334      ( 04 Nov 2022 05:37 )             40.7         11-04    136  |  102  |  7   ----------------------------<  85  3.5   |  27  |  0.48<L>    Ca    9.3      04 Nov 2022 05:37    TPro  8.9<H>  /  Alb  4.0  /  TBili  0.7  /  DBili  x   /  AST  36  /  ALT  31  /  AlkPhos  116  11-03    < from: CT Abdomen and Pelvis w/ IV Cont (11.03.22 @ 03:16) >  Question of fecal impaction and associated stercoral proctitis. There is   distention of the remainder of the colon, suspicious forcolonic ileus.    Interval cholecystectomy changes. Small amount of fluid seen within the   gallbladder fossa. While this can be a normal finding postoperatively,   possibility of small bilateral are abscess not excluded. Correlate   clinically.            * Resolvedsevere constipation after narcs and sedentary life style due to morbid obesity  resolved  Zosyn  prn analgesia with IV Tylenol  no narc if possible  dc Morse    * Small amount of fluid seen within the gallbladder fossa. While this can be a normal finding postoperatively, possibility of small bilateral are abscess not excluded  surgery was consulted not symptomatic    * HTN resume Dyazide     Crusted Zoster            
Patient was seen and examined today bedside, the patient denied any pain, nausea, vomiting, fever or chills. She states that she has three bowel movements yesterday. No acute events were reported from nursing staff.     ICU Vital Signs Last 24 Hrs  T(C): 37.4 (04 Nov 2022 10:35), Max: 37.6 (04 Nov 2022 05:39)  T(F): 99.4 (04 Nov 2022 10:35), Max: 99.7 (04 Nov 2022 05:39)  HR: 81 (04 Nov 2022 10:35) (81 - 104)  BP: 106/87 (04 Nov 2022 10:35) (106/87 - 155/82)  BP(mean): 101 (03 Nov 2022 18:05) (101 - 101)  ABP: --  ABP(mean): --  RR: 18 (04 Nov 2022 10:35) (18 - 18)  SpO2: 95% (04 Nov 2022 10:35) (93% - 97%)    O2 Parameters below as of 04 Nov 2022 10:35  Patient On (Oxygen Delivery Method): room air      Physical Exam:  General: AAOx3, Well developed, NAD  Chest: Normal respiratory effort  Heart: RRR  Abdomen: non distended, non tender, no peritoneal.   Neuro/Psych: No localized deficits. Normal speech, normal tone                          13.3   13.05 )-----------( 334      ( 04 Nov 2022 05:37 )             40.7   11-04    136  |  102  |  7   ----------------------------<  85  3.5   |  27  |  0.48<L>    Ca    9.3      04 Nov 2022 05:37    TPro  8.9<H>  /  Alb  4.0  /  TBili  0.7  /  DBili  x   /  AST  36  /  ALT  31  /  AlkPhos  116  11-03

## 2022-11-05 NOTE — PHYSICAL THERAPY INITIAL EVALUATION ADULT - PERTINENT HX OF CURRENT PROBLEM, REHAB EVAL
67-year-old female status post cholecystectomy 2 weeks ago at outside hospital presents with abdominal pain and urinary retention.  Patient states she has not had a bowel movement since surgery.  Developed urinary retention yesterday. CT Abdomen and Pelvis, There is distention of the remainder of the colon, suspicious for colonic ileus. Resolved severe constipation after narcs and sedentary life style due to morbid obesity.

## 2022-11-05 NOTE — PROGRESS NOTE ADULT - NUTRITIONAL ASSESSMENT
Please resend this 1st rx. Pt has to go to different pharmacy.  
This patient has been assessed with a concern for Malnutrition and has been determined to have a diagnosis/diagnoses of Severe protein-calorie malnutrition and Morbid obesity (BMI > 40).    This patient is being managed with:   Diet Regular-  Entered: Nov  3 2022  8:23AM    
This patient has been assessed with a concern for Malnutrition and has been determined to have a diagnosis/diagnoses of Severe protein-calorie malnutrition and Morbid obesity (BMI > 40).    This patient is being managed with:   Diet Regular-  Entered: Nov  3 2022  8:23AM

## 2022-11-05 NOTE — DISCHARGE NOTE NURSING/CASE MANAGEMENT/SOCIAL WORK - PATIENT PORTAL LINK FT
You can access the FollowMyHealth Patient Portal offered by [facility] by registering at the following website: [email or web address]. By joining [facility]’s FollowMyHealth portal, you will also be able to view your health information using other applications (apps) compatible with our system.

## 2022-11-05 NOTE — DISCHARGE NOTE NURSING/CASE MANAGEMENT/SOCIAL WORK - NSDCVIVACCINE_GEN_ALL_CORE_FT
No Vaccines Administered. influenza, high-dose, quadrivalent; 06-Nov-2022 11:04; Kacie Quintana (RN); [employer]; [id number] (Exp. Date: 06-Nov-2022); IntraMuscular; Deltoid Right.; 0.7 milliLiter(s); VIS (VIS Published: 06-Aug-2021, VIS Presented: 06-Nov-2022);

## 2022-11-05 NOTE — PHYSICAL THERAPY INITIAL EVALUATION ADULT - MODALITIES TREATMENT COMMENTS
Pt. need RW for now and PT recommendation is to use Outpatient facility to improve with strength and mobility.

## 2022-11-05 NOTE — DISCHARGE NOTE NURSING/CASE MANAGEMENT/SOCIAL WORK - NSDCPEFALRISK_GEN_ALL_CORE
For information on Fall & Injury Prevention, visit: [email or web address] OR  [email or web address] OR  https://www.cdc.gov/steadi/patient.html

## 2022-11-05 NOTE — PHYSICAL THERAPY INITIAL EVALUATION ADULT - PLANNED THERAPY INTERVENTIONS, PT EVAL
November 19, 2021       Nasra Bay MD  [address]  [address]  [facility] [address]  Via Fax: [phone]      Patient: Marry Potts   YOB: 1940   Date of Visit: 11/19/2021       Dear Dr. Bay:    Thank you for referring Marry Potts to me for evaluation. Below are my notes for this visit with her.    If you have questions, please do not hesitate to call me. I look forward to following your patient along with you.      Sincerely,        Zev Diez MD        CC: No Recipients  Zev Diez MD  11/19/2021  9:18 AM  Signed                                        [facility]                           Cardiology      PCP: Nasra Bay MD    Chief Complaint   Patient presents with   • Follow-up   • Palpitations     occasional         HPI  Marry Potts is a 81 year old female with past medical history hypertension, hyperlipidemia and family history of CAD presents here for routine follow-up appointment. She denies any chest pain or shortness breath. She has been compliant with her medications. She feels that she has not had any palpitations. She is compliant with her Coumadin. No bleeding.    Past Medical History  Past Medical History:   Diagnosis Date   • AAA (abdominal aortic aneurysm) (CMS/HCC)     3.1   • Congestive heart failure (CHF) (CMS/HCC)    • DVT (deep venous thrombosis) (CMS/HCC)    • Dyslipidemia    • Other pulmonary embolism without acute cor pulmonale (CMS/HCC)        Past Surgical History  Past Surgical History:   Procedure Laterality Date   • Back surgery     • Cholecystectomy     • Eye surgery     • Hernia repair     • Hip surgery     • Knee surgery     • Total knee replacement         Family History  Family History   Problem Relation Age of Onset   • Coronary Artery Disease Other         Significant for premature CAD.    • Aneurysm Other         Significant for AAA.       Social History  Social History     Tobacco Use   • Smoking status: Never  Smoker   • Smokeless tobacco: Never Used   • Tobacco comment: Never used tobacco. denies smoking.    Vaping Use   • Vaping Use: never used   Substance Use Topics   • Alcohol use: Yes     Comment: drinks socially and small glass of wine   • Drug use: Never        Allergies  ALLERGIES:   Allergen Reactions   • Lisinopril Other (See Comments)   • Shellfish-Derived Products   (Food Or Med) Nausea & Vomiting   • Adhesive   (Environmental) Other (See Comments)   • Codeine Nausea & Vomiting, Palpitations and Other (See Comments)   • Morphine Other (See Comments)       Current Medications  Current Medications    ACETAMINOPHEN (TYLENOL) 500 MG TABLET    Take 500 mg by mouth as needed.     ALENDRONATE (FOSAMAX) 70 MG TABLET    TAKE 1 TABLET BY MOUTH ONE TIME PER WEEK    ALPRAZOLAM (XANAX) 0.25 MG TABLET    Take 0.25 mg by mouth nightly as needed for Sleep.    LOSARTAN (COZAAR) 50 MG TABLET    Take 1 tablet by mouth daily.    METOPROLOL SUCCINATE (TOPROL-XL) 25 MG 24 HR TABLET    Take 1 tablet by mouth daily.    OMEPRAZOLE (PRILOSEC) 40 MG CAPSULE    Take 40 mg by mouth daily.    SIMVASTATIN (ZOCOR) 20 MG TABLET    Take 1 tablet by mouth daily.    WARFARIN (COUMADIN) 4 MG TABLET    TAKE 1 TABLET BY MOUTH EVERY DAY       Review of Systems  13 point ROS negative except those mentioned above in the HPI    Physical Exam  There were no vitals taken for this visit.  Vital signs were reviewed today.    General: Alert/oriented  HEENT: Normacephalic, EOMI  Neck: no JVP or carotid bruit  Chest: CTA bilaterally  CV: rrr. No mgr  Abd: soft. NTD  Ext: No edema  Skin: no bruising  Neuro: CN grossly intact  Psych: cooperative      Recent Labs:   Lipid panel->  Cholesterol 176  Triglycerides 237  HDL 35  LDL 94    Diagnostic Testing:   Carotid ultrasound (11/2021)  1. 16-49% stenosis involving the right internal carotid artery.  2. 16-49% stenosis involving the left internal carotid artery.  3. Antegrade flow noted in the right vertebral  artery.  4. Antegrade flow noted in the left vertebral artery.  5. No evidence of subclavian steal syndrome.  6. No change from the study of 12/26/2019- [facility]  7. R ICA plaque= .20 cm     L ICA plaque= .32 cm.    Echocardiogram (11/2021)  1. Left ventricle: The cavity size is normal. Wall thickness is normal.     The ejection fraction was measured by biplane method of disks. The     ejection fraction is 60%.  2. Mitral valve: Mild regurgitation.  3. Left atrium: The atrium is severely dilated.  4. Tricuspid valve: Mild regurgitation.  Impressions:  No significant change noted when compated to study report  from 2020.     Assessment :  1. Atrial fibrillation, unspecified type (CMS/HCC)    2. Claudication of lower extremity (CMS/HCC)    3. Bilateral carotid artery disease, unspecified type (CMS/HCC)    4. PVC's (premature ventricular contractions)      Marry is doing from a cardiac standpoint. She has no complaints today. Her LDL went up a little bit, we will continue current management. She is not compliant with her CPAP.    Plan:  1. Continue current medications  2. Lipid panel  3. Follow-up in 6 months         Zev Diez MD  Interventional Cardiology / EndoVascular                gait training/neuromuscular re-education/postural re-education/strengthening

## 2022-11-06 ENCOUNTER — TRANSCRIPTION ENCOUNTER (OUTPATIENT)
Age: 67
End: 2022-11-06

## 2022-11-06 VITALS
HEART RATE: 87 BPM | TEMPERATURE: 99 F | SYSTOLIC BLOOD PRESSURE: 102 MMHG | OXYGEN SATURATION: 97 % | RESPIRATION RATE: 18 BRPM | DIASTOLIC BLOOD PRESSURE: 64 MMHG

## 2022-11-06 PROCEDURE — 99239 HOSP IP/OBS DSCHRG MGMT >30: CPT

## 2022-11-06 RX ORDER — OXYCODONE AND ACETAMINOPHEN 5; 325 MG/1; MG/1
1 TABLET ORAL
Qty: 0 | Refills: 0 | DISCHARGE

## 2022-11-06 RX ADMIN — Medication 1 TABLET(S): at 09:23

## 2022-11-06 RX ADMIN — INFLUENZA VIRUS VACCINE 0.7 MILLILITER(S): 15; 15; 15; 15 SUSPENSION INTRAMUSCULAR at 11:04

## 2022-11-06 RX ADMIN — PIPERACILLIN AND TAZOBACTAM 25 GRAM(S): 4; .5 INJECTION, POWDER, LYOPHILIZED, FOR SOLUTION INTRAVENOUS at 05:38

## 2022-11-06 RX ADMIN — ENOXAPARIN SODIUM 40 MILLIGRAM(S): 100 INJECTION SUBCUTANEOUS at 09:26

## 2022-11-06 NOTE — DISCHARGE NOTE PROVIDER - DETAILS OF MALNUTRITION DIAGNOSIS/DIAGNOSES
This patient has been assessed with a concern for Malnutrition and was treated during this hospitalization for the following Nutrition diagnosis/diagnoses:     -  11/04/2022: Severe protein-calorie malnutrition   -  11/04/2022: Morbid obesity (BMI > 40)

## 2022-11-06 NOTE — DISCHARGE NOTE PROVIDER - CARE PROVIDER_API CALL
Yen Nicholas  [facility]  [address], [address]  [place] [address]  Phone: [phone]  Fax: [phone]  Follow Up Time: 1 week

## 2022-11-06 NOTE — DISCHARGE NOTE PROVIDER - HOSPITAL COURSE
67-year-old female status post cholecystectomy 2 weeks ago at outside hospital presents with abdominal pain and urinary retention.  Patient states she has not had a bowel movement since surgery.  Developed urinary retention yesterday.  Does feel her abdomen is distended.  Does admit to using pain medication postoperatively, oxycodone.     Vital Signs Last 24 Hrs  T(C): 37.1 (06 Nov 2022 08:41), Max: 37.1 (06 Nov 2022 08:41)  T(F): 98.7 (06 Nov 2022 08:41), Max: 98.7 (06 Nov 2022 08:41)  HR: 87 (06 Nov 2022 08:41) (81 - 87)  BP: 102/64 (06 Nov 2022 08:41) (102/64 - 136/67)  BP(mean): --  RR: 18 (06 Nov 2022 08:41) (16 - 18)  SpO2: 97% (06 Nov 2022 08:41) (95% - 97%)    Parameters below as of 06 Nov 2022 08:41  Patient On (Oxygen Delivery Method): room air    MEDICATIONS  (STANDING):  bisacodyl Suppository 10 milliGRAM(s) Rectal at bedtime  enoxaparin Injectable 40 milliGRAM(s) SubCutaneous every 12 hours  piperacillin/tazobactam IVPB.. 3.375 Gram(s) IV Intermittent every 8 hours  triamterene 37.5 mG/hydrochlorothiazide 25 mG Tablet 1 Tablet(s) Oral daily      * Resolved  severe constipation after narcs and sedentary life style due to morbid obesity  resolved  s/p Zosyn stercoral colitis         * Small amount of fluid seen within the gallbladder fossa. While this can be a normal finding postoperatively, possibility of small bilateral are abscess not excluded  surgery was consulted not symptomatic    * HTN resume Dyazide     Crusted Zoster

## 2022-11-06 NOTE — DISCHARGE NOTE PROVIDER - NSDCCPCAREPLAN_GEN_ALL_CORE_FT
PRINCIPAL DISCHARGE DIAGNOSIS  Diagnosis: Colitis  Assessment and Plan of Treatment: constipation resolved.      SECONDARY DISCHARGE DIAGNOSES  Diagnosis: Urinary retention  Assessment and Plan of Treatment:

## 2022-11-06 NOTE — DISCHARGE NOTE PROVIDER - NSDCMRMEDTOKEN_GEN_ALL_CORE_FT
Dyazide 25 mg-37.5 mg oral capsule: 1 cap(s) orally once a day  meloxicam 15 mg oral tablet: 1 tab(s) orally once a day, As Needed for pain  Metamucil 3.4 g/3.7 g oral powder for reconstitution: 3.4 gram(s) orally once a day, As Needed

## 2022-11-07 ENCOUNTER — NON-APPOINTMENT (OUTPATIENT)
Age: 67
End: 2022-11-07

## 2022-11-08 LAB
CULTURE RESULTS: SIGNIFICANT CHANGE UP
CULTURE RESULTS: SIGNIFICANT CHANGE UP
SPECIMEN SOURCE: SIGNIFICANT CHANGE UP
SPECIMEN SOURCE: SIGNIFICANT CHANGE UP

## 2022-11-14 DIAGNOSIS — E43 UNSPECIFIED SEVERE PROTEIN-CALORIE MALNUTRITION: ICD-10-CM

## 2022-11-14 DIAGNOSIS — Z88.6 ALLERGY STATUS TO ANALGESIC AGENT: ICD-10-CM

## 2022-11-14 DIAGNOSIS — E87.1 HYPO-OSMOLALITY AND HYPONATREMIA: ICD-10-CM

## 2022-11-14 DIAGNOSIS — I73.9 PERIPHERAL VASCULAR DISEASE, UNSPECIFIED: ICD-10-CM

## 2022-11-14 DIAGNOSIS — J45.909 UNSPECIFIED ASTHMA, UNCOMPLICATED: ICD-10-CM

## 2022-11-14 DIAGNOSIS — Z87.891 PERSONAL HISTORY OF NICOTINE DEPENDENCE: ICD-10-CM

## 2022-11-14 DIAGNOSIS — E66.01 MORBID (SEVERE) OBESITY DUE TO EXCESS CALORIES: ICD-10-CM

## 2022-11-14 DIAGNOSIS — B02.9 ZOSTER WITHOUT COMPLICATIONS: ICD-10-CM

## 2022-11-14 DIAGNOSIS — R33.9 RETENTION OF URINE, UNSPECIFIED: ICD-10-CM

## 2022-11-14 DIAGNOSIS — Z98.84 BARIATRIC SURGERY STATUS: ICD-10-CM

## 2022-11-14 DIAGNOSIS — K52.9 NONINFECTIVE GASTROENTERITIS AND COLITIS, UNSPECIFIED: ICD-10-CM

## 2022-11-14 DIAGNOSIS — Z98.890 OTHER SPECIFIED POSTPROCEDURAL STATES: ICD-10-CM

## 2022-11-14 DIAGNOSIS — G47.30 SLEEP APNEA, UNSPECIFIED: ICD-10-CM

## 2022-11-21 ENCOUNTER — APPOINTMENT (OUTPATIENT)
Dept: UROLOGY | Facility: CLINIC | Age: 67
End: 2022-11-21

## 2022-11-21 VITALS
DIASTOLIC BLOOD PRESSURE: 85 MMHG | OXYGEN SATURATION: 98 % | HEART RATE: 98 BPM | BODY MASS INDEX: 44.22 KG/M2 | WEIGHT: 259 LBS | SYSTOLIC BLOOD PRESSURE: 151 MMHG | HEIGHT: 64 IN

## 2022-11-21 PROCEDURE — 99204 OFFICE O/P NEW MOD 45 MIN: CPT

## 2022-11-21 RX ORDER — NITROFURANTOIN MACROCRYSTALS 100 MG/1
100 CAPSULE ORAL 3 TIMES DAILY
Qty: 12 | Refills: 0 | Status: ACTIVE | COMMUNITY
Start: 2022-11-21 | End: 1900-01-01

## 2022-11-21 RX ORDER — BETHANECHOL CHLORIDE 50 MG/1
50 TABLET ORAL
Qty: 60 | Refills: 2 | Status: ACTIVE | COMMUNITY
Start: 2022-11-21 | End: 1900-01-01

## 2022-11-21 NOTE — HISTORY OF PRESENT ILLNESS
[FreeTextEntry1] : This patient presents with a complaint of voiding difficulties. Patient reports this started about a year ago when she needed a Morse catheter placed after her cholecystectomy and currently does not have a catheter but notes difficulty voiding. She denies any other complaints.

## 2022-11-21 NOTE — ASSESSMENT
[FreeTextEntry1] : Labs are ordered. Patient will be given antibiotics and started on Bethanechol and return to the office for a urodynamics study.

## 2022-11-21 NOTE — REVIEW OF SYSTEMS
[Negative] : Heme/Lymph [Eyesight Problems] : eyesight problems [Dry Eyes] : dryness of the eyes [Shortness Of Breath] : shortness of breath [Wheezing] : wheezing [Painful Bock] : painful Bock [Date of last menstrual period ____] : date of last menstrual period: [unfilled] [History of kidney stones] : history of kidney stones [Urine retention] : urine retention [Wake up at night to urinate  How many times?  ___] : wakes up to urinate [unfilled] times during the night [Wait a long time to urinate] : waits a long time to urinate [Slow urine stream] : slow urine stream [Leakage of urine with urgency] : leakage of urine with urgency [Joint Swelling] : joint swelling [Difficulty Walking] : difficulty walking

## 2022-11-30 LAB
APPEARANCE: ABNORMAL
BACTERIA: ABNORMAL
BILIRUBIN URINE: NEGATIVE
BLOOD URINE: NEGATIVE
CALCIUM OXALATE CRYSTALS: ABNORMAL
COLOR: YELLOW
GLUCOSE QUALITATIVE U: NEGATIVE
HYALINE CASTS: 4 /LPF
KETONES URINE: NEGATIVE
LEUKOCYTE ESTERASE URINE: ABNORMAL
MICROSCOPIC-UA: NORMAL
NITRITE URINE: NEGATIVE
PH URINE: 6.5
PROTEIN URINE: ABNORMAL
RED BLOOD CELLS URINE: 6 /HPF
SPECIFIC GRAVITY URINE: 1.02
SQUAMOUS EPITHELIAL CELLS: >27 /HPF
UROBILINOGEN URINE: NORMAL
WHITE BLOOD CELLS URINE: 17 /HPF

## 2022-12-01 LAB — BACTERIA UR CULT: NORMAL

## 2022-12-02 ENCOUNTER — NON-APPOINTMENT (OUTPATIENT)
Age: 67
End: 2022-12-02

## 2022-12-02 LAB — URINE CYTOLOGY: NORMAL

## 2022-12-12 ENCOUNTER — APPOINTMENT (OUTPATIENT)
Dept: UROLOGY | Facility: CLINIC | Age: 67
End: 2022-12-12

## 2022-12-27 ENCOUNTER — APPOINTMENT (OUTPATIENT)
Dept: UROLOGY | Facility: CLINIC | Age: 67
End: 2022-12-27

## 2023-01-25 ENCOUNTER — APPOINTMENT (OUTPATIENT)
Dept: UROLOGY | Facility: CLINIC | Age: 68
End: 2023-01-25

## 2023-02-27 ENCOUNTER — APPOINTMENT (OUTPATIENT)
Dept: UROLOGY | Facility: CLINIC | Age: 68
End: 2023-02-27
Payer: COMMERCIAL

## 2023-02-27 VITALS
SYSTOLIC BLOOD PRESSURE: 163 MMHG | HEIGHT: 64 IN | BODY MASS INDEX: 44.73 KG/M2 | DIASTOLIC BLOOD PRESSURE: 93 MMHG | TEMPERATURE: 98.1 F | HEART RATE: 75 BPM | RESPIRATION RATE: 14 BRPM | OXYGEN SATURATION: 96 % | WEIGHT: 262 LBS

## 2023-02-27 DIAGNOSIS — Z87.898 PERSONAL HISTORY OF OTHER SPECIFIED CONDITIONS: ICD-10-CM

## 2023-02-27 PROCEDURE — 51741 ELECTRO-UROFLOWMETRY FIRST: CPT

## 2023-02-27 PROCEDURE — 51728 CYSTOMETROGRAM W/VP: CPT

## 2023-02-27 PROCEDURE — 51798 US URINE CAPACITY MEASURE: CPT | Mod: 59

## 2023-02-27 PROCEDURE — 51797 INTRAABDOMINAL PRESSURE TEST: CPT

## 2023-02-27 PROCEDURE — 51784 ANAL/URINARY MUSCLE STUDY: CPT

## 2023-03-04 PROBLEM — Z87.898 HISTORY OF URINARY RETENTION: Status: ACTIVE | Noted: 2022-11-21

## 2023-03-24 ENCOUNTER — APPOINTMENT (OUTPATIENT)
Dept: UROLOGY | Facility: CLINIC | Age: 68
End: 2023-03-24

## 2023-04-26 ENCOUNTER — APPOINTMENT (OUTPATIENT)
Dept: UROLOGY | Facility: CLINIC | Age: 68
End: 2023-04-26
Payer: COMMERCIAL

## 2023-04-26 VITALS
DIASTOLIC BLOOD PRESSURE: 90 MMHG | SYSTOLIC BLOOD PRESSURE: 151 MMHG | HEIGHT: 64 IN | OXYGEN SATURATION: 97 % | HEART RATE: 82 BPM | WEIGHT: 262 LBS | RESPIRATION RATE: 16 BRPM | BODY MASS INDEX: 44.73 KG/M2

## 2023-04-26 DIAGNOSIS — R39.11 HESITANCY OF MICTURITION: ICD-10-CM

## 2023-04-26 DIAGNOSIS — N34.3 URETHRAL SYNDROME, UNSPECIFIED: ICD-10-CM

## 2023-04-26 PROCEDURE — 52285 CYSTOSCOPY AND TREATMENT: CPT

## 2023-04-27 PROBLEM — R39.11 URINARY HESITANCY: Status: ACTIVE | Noted: 2022-11-21

## 2023-04-27 PROBLEM — N34.3 URETHRAL SYNDROME: Status: ACTIVE | Noted: 2023-04-27

## 2023-06-12 ENCOUNTER — APPOINTMENT (OUTPATIENT)
Dept: VASCULAR SURGERY | Facility: HOSPITAL | Age: 68
End: 2023-06-12

## 2023-09-20 ENCOUNTER — ASOB RESULT (OUTPATIENT)
Age: 68
End: 2023-09-20

## 2023-09-20 ENCOUNTER — APPOINTMENT (OUTPATIENT)
Dept: ANTEPARTUM | Facility: CLINIC | Age: 68
End: 2023-09-20
Payer: COMMERCIAL

## 2023-09-20 PROCEDURE — 76856 US EXAM PELVIC COMPLETE: CPT | Mod: 59

## 2023-09-20 PROCEDURE — 76830 TRANSVAGINAL US NON-OB: CPT

## 2023-10-04 ENCOUNTER — APPOINTMENT (OUTPATIENT)
Dept: VASCULAR SURGERY | Facility: CLINIC | Age: 68
End: 2023-10-04
Payer: COMMERCIAL

## 2023-10-04 VITALS
OXYGEN SATURATION: 95 % | HEIGHT: 64 IN | RESPIRATION RATE: 16 BRPM | SYSTOLIC BLOOD PRESSURE: 144 MMHG | BODY MASS INDEX: 44.73 KG/M2 | HEART RATE: 108 BPM | DIASTOLIC BLOOD PRESSURE: 94 MMHG | WEIGHT: 262 LBS

## 2023-10-04 DIAGNOSIS — I89.0 LYMPHEDEMA, NOT ELSEWHERE CLASSIFIED: ICD-10-CM

## 2023-10-04 PROCEDURE — 99212 OFFICE O/P EST SF 10 MIN: CPT

## 2024-02-07 ENCOUNTER — APPOINTMENT (OUTPATIENT)
Dept: ANTEPARTUM | Facility: CLINIC | Age: 69
End: 2024-02-07
Payer: COMMERCIAL

## 2024-02-07 ENCOUNTER — ASOB RESULT (OUTPATIENT)
Age: 69
End: 2024-02-07

## 2024-02-07 PROCEDURE — 76830 TRANSVAGINAL US NON-OB: CPT

## 2024-02-07 PROCEDURE — 76856 US EXAM PELVIC COMPLETE: CPT | Mod: 59

## 2024-02-23 NOTE — ED PROVIDER NOTE - CROS ED ROS STATEMENT
Cardiology Consult Service Progress Note     Armando Rodriguez MD [facility]  Cardiology Fellow   Pager [phone]    Patient is a 66y old  Male who presents with a chief complaint of arrhythmia monitoring (22 Feb 2024 21:49)      SUBJECTIVE/OVERNIGHT EVENTS   No acute events overnight.      OBJECTIVE  Vital Signs Last 24 Hrs  T(C): 36.7 (23 Feb 2024 05:27), Max: 38.9 (22 Feb 2024 12:00)  T(F): 98.1 (23 Feb 2024 05:27), Max: 102.1 (22 Feb 2024 12:00)  HR: 100 (23 Feb 2024 07:40) (68 - 124)  BP: 122/75 (23 Feb 2024 07:40) (94/69 - 134/79)  BP(mean): 93 (23 Feb 2024 07:40) (75 - 104)  RR: 19 (23 Feb 2024 07:40) (19 - 36)  SpO2: 99% (23 Feb 2024 07:40) (96% - 100%)    Parameters below as of 23 Feb 2024 07:40  Patient On (Oxygen Delivery Method): room air        I&O's Summary    22 Feb 2024 07:01  -  23 Feb 2024 07:00  --------------------------------------------------------  IN: 150 mL / OUT: 275 mL / NET: -125 mL        PHYSICAL EXAM:  GENERAL: NAD, well-developed  HEAD:  Atraumatic, Normocephalic  EYES: EOMI, conjunctiva and sclera clear  NECK: Supple, No JVD  CHEST/LUNG: Clear to auscultation bilaterally; No wheeze  HEART: Regular rate and rhythm; No murmurs, rubs, or gallops  ABDOMEN: Soft, Nontender, Nondistended; Bowel sounds present  EXTREMITIES:  2+ Peripheral Pulses, No clubbing, cyanosis, or edema  PSYCH: AAOx3  NEUROLOGY: non-focal  SKIN: No rashes or lesions    LABS                        9.4    15.69 )-----------( 221      ( 23 Feb 2024 06:16 )             28.7                         9.5    17.52 )-----------( 190      ( 22 Feb 2024 05:30 )             29.1     02-23    133<L>  |  100  |  14  ----------------------------<  98  4.1   |  24  |  0.94  02-22    133<L>  |  101  |  15  ----------------------------<  101<H>  4.3   |  25  |  0.85    Ca    8.5      23 Feb 2024 06:16  Ca    8.7      22 Feb 2024 05:30  Phos  2.9     02-22  Mg     2.1     02-22    TPro  6.7  /  Alb  2.8<L>  /  TBili  0.4  /  DBili  x   /  AST  29  /  ALT  27  /  AlkPhos  64  02-23  TPro  6.5  /  Alb  2.6<L>  /  TBili  0.5  /  DBili  x   /  AST  31  /  ALT  32  /  AlkPhos  64  02-22    CAPILLARY BLOOD GLUCOSE        PT/INR - ( 23 Feb 2024 06:16 )   PT: 12.9 sec;   INR: 1.14          PTT - ( 23 Feb 2024 06:16 )  PTT:25.8 sec   20 Feb 2024 09:04 Troponin x     / CK 52 U/L / CKMB x     / CKMB Units 1.9 ng/mL              Urinalysis Basic - ( 23 Feb 2024 06:16 )    Color: x / Appearance: x / SG: x / pH: x  Gluc: 98 mg/dL / Ketone: x  / Bili: x / Urobili: x   Blood: x / Protein: x / Nitrite: x   Leuk Esterase: x / RBC: x / WBC x   Sq Epi: x / Non Sq Epi: x / Bacteria: x        RADIOLOGY & ADDITIONAL TESTS:    MEDICATIONS  (STANDING):  aspirin  chewable 81 milliGRAM(s) Oral daily  cefTRIAXone   IVPB 2000 milliGRAM(s) IV Intermittent every 24 hours  heparin   Injectable 5000 Unit(s) SubCutaneous every 8 hours  pantoprazole    Tablet 40 milliGRAM(s) Oral before breakfast  senna 2 Tablet(s) Oral at bedtime  sodium chloride 0.9% lock flush 3 milliLiter(s) IV Push every 8 hours    MEDICATIONS  (PRN):  acetaminophen     Tablet .. 650 milliGRAM(s) Oral every 6 hours PRN Temp greater or equal to 38C (100.4F), Mild Pain (1 - 3), Moderate Pain (4 - 6)     Cardiology Consult Service Progress Note     Patient is a 66y old  Male who presents with a chief complaint of arrhythmia monitoring (22 Feb 2024 21:49)      SUBJECTIVE/OVERNIGHT EVENTS   No acute events overnight.   At bedside, patient denies any symptoms this morning. Had fevers to 102 yesterday, but none so far today. Patient denies feeling febrile.       OBJECTIVE  Vital Signs Last 24 Hrs  T(C): 36.7 (23 Feb 2024 05:27), Max: 38.9 (22 Feb 2024 12:00)  T(F): 98.1 (23 Feb 2024 05:27), Max: 102.1 (22 Feb 2024 12:00)  HR: 100 (23 Feb 2024 07:40) (68 - 124)  BP: 122/75 (23 Feb 2024 07:40) (94/69 - 134/79)  BP(mean): 93 (23 Feb 2024 07:40) (75 - 104)  RR: 19 (23 Feb 2024 07:40) (19 - 36)  SpO2: 99% (23 Feb 2024 07:40) (96% - 100%)    Parameters below as of 23 Feb 2024 07:40  Patient On (Oxygen Delivery Method): room air        I&O's Summary    22 Feb 2024 07:01  -  23 Feb 2024 07:00  --------------------------------------------------------  IN: 150 mL / OUT: 275 mL / NET: -125 mL        PHYSICAL EXAM:  GENERAL: NAD, well-developed  HEAD:  Atraumatic, Normocephalic  EYES: EOMI, conjunctiva and sclera clear  NECK: Supple, No JVD  CHEST/LUNG: Clear to auscultation bilaterally; No wheeze  HEART: Regular rate and rhythm; No murmurs, rubs, or gallops  ABDOMEN: Soft, Nontender, Nondistended; Bowel sounds present  EXTREMITIES: WWP, No clubbing, cyanosis, or edema  PSYCH: AAOx3  NEUROLOGY: no gross focal deficits  SKIN: No rashes or lesions    LABS                        9.4    15.69 )-----------( 221      ( 23 Feb 2024 06:16 )             28.7                         9.5    17.52 )-----------( 190      ( 22 Feb 2024 05:30 )             29.1     02-23    133<L>  |  100  |  14  ----------------------------<  98  4.1   |  24  |  0.94  02-22    133<L>  |  101  |  15  ----------------------------<  101<H>  4.3   |  25  |  0.85    Ca    8.5      23 Feb 2024 06:16  Ca    8.7      22 Feb 2024 05:30  Phos  2.9     02-22  Mg     2.1     02-22    TPro  6.7  /  Alb  2.8<L>  /  TBili  0.4  /  DBili  x   /  AST  29  /  ALT  27  /  AlkPhos  64  02-23  TPro  6.5  /  Alb  2.6<L>  /  TBili  0.5  /  DBili  x   /  AST  31  /  ALT  32  /  AlkPhos  64  02-22    CAPILLARY BLOOD GLUCOSE        PT/INR - ( 23 Feb 2024 06:16 )   PT: 12.9 sec;   INR: 1.14     PTT - ( 23 Feb 2024 06:16 )  PTT:25.8 sec  20 Feb 2024 09:04 Troponin x     / CK 52 U/L / CKMB x     / CKMB Units 1.9 ng/mL              Urinalysis Basic - ( 23 Feb 2024 06:16 )    Color: x / Appearance: x / SG: x / pH: x  Gluc: 98 mg/dL / Ketone: x  / Bili: x / Urobili: x   Blood: x / Protein: x / Nitrite: x   Leuk Esterase: x / RBC: x / WBC x   Sq Epi: x / Non Sq Epi: x / Bacteria: x        RADIOLOGY & ADDITIONAL TESTS:    MEDICATIONS  (STANDING):  aspirin  chewable 81 milliGRAM(s) Oral daily  cefTRIAXone   IVPB 2000 milliGRAM(s) IV Intermittent every 24 hours  heparin   Injectable 5000 Unit(s) SubCutaneous every 8 hours  pantoprazole    Tablet 40 milliGRAM(s) Oral before breakfast  senna 2 Tablet(s) Oral at bedtime  sodium chloride 0.9% lock flush 3 milliLiter(s) IV Push every 8 hours    MEDICATIONS  (PRN):  acetaminophen     Tablet .. 650 milliGRAM(s) Oral every 6 hours PRN Temp greater or equal to 38C (100.4F), Mild Pain (1 - 3), Moderate Pain (4 - 6)     Cardiology Consult Service Progress Note     Patient is a 66y old  Male who presents with a chief complaint of arrhythmia monitoring (22 Feb 2024 21:49)    SUBJECTIVE/OVERNIGHT EVENTS   No acute events overnight.   At bedside, patient denies any symptoms this morning. Had fevers to 102 yesterday, but none so far today. Patient denies feeling febrile.     OBJECTIVE  Vital Signs Last 24 Hrs  T(C): 36.7 (23 Feb 2024 05:27), Max: 38.9 (22 Feb 2024 12:00)  T(F): 98.1 (23 Feb 2024 05:27), Max: 102.1 (22 Feb 2024 12:00)  HR: 100 (23 Feb 2024 07:40) (68 - 124)  BP: 122/75 (23 Feb 2024 07:40) (94/69 - 134/79)  BP(mean): 93 (23 Feb 2024 07:40) (75 - 104)  RR: 19 (23 Feb 2024 07:40) (19 - 36)  SpO2: 99% (23 Feb 2024 07:40) (96% - 100%)    Parameters below as of 23 Feb 2024 07:40  Patient On (Oxygen Delivery Method): room air    I&O's Summary    22 Feb 2024 07:01  -  23 Feb 2024 07:00  --------------------------------------------------------  IN: 150 mL / OUT: 275 mL / NET: -125 mL    PHYSICAL EXAM:  GENERAL: NAD, well-developed  HEAD:  Atraumatic, Normocephalic  EYES: EOMI, conjunctiva and sclera clear  NECK: Supple, No JVD  CHEST/LUNG: Clear to auscultation bilaterally; No wheeze  HEART: Regular rate and rhythm; No murmurs, rubs, or gallops  ABDOMEN: Soft, Nontender, Nondistended; Bowel sounds present  EXTREMITIES: WWP, No clubbing, cyanosis, or edema  PSYCH: AAOx3  NEUROLOGY: no gross focal deficits  SKIN: No rashes or lesions    LABS                        9.4    15.69 )-----------( 221      ( 23 Feb 2024 06:16 )             28.7                         9.5    17.52 )-----------( 190      ( 22 Feb 2024 05:30 )             29.1     02-23    133<L>  |  100  |  14  ----------------------------<  98  4.1   |  24  |  0.94  02-22    133<L>  |  101  |  15  ----------------------------<  101<H>  4.3   |  25  |  0.85    Ca    8.5      23 Feb 2024 06:16  Ca    8.7      22 Feb 2024 05:30  Phos  2.9     02-22  Mg     2.1     02-22    TPro  6.7  /  Alb  2.8<L>  /  TBili  0.4  /  DBili  x   /  AST  29  /  ALT  27  /  AlkPhos  64  02-23  TPro  6.5  /  Alb  2.6<L>  /  TBili  0.5  /  DBili  x   /  AST  31  /  ALT  32  /  AlkPhos  64  02-22    PT/INR - ( 23 Feb 2024 06:16 )   PT: 12.9 sec;   INR: 1.14     PTT - ( 23 Feb 2024 06:16 )  PTT:25.8 sec  20 Feb 2024 09:04 Troponin x     / CK 52 U/L / CKMB x     / CKMB Units 1.9 ng/mL    MEDICATIONS  (STANDING):  aspirin  chewable 81 milliGRAM(s) Oral daily  cefTRIAXone   IVPB 2000 milliGRAM(s) IV Intermittent every 24 hours  heparin   Injectable 5000 Unit(s) SubCutaneous every 8 hours  pantoprazole    Tablet 40 milliGRAM(s) Oral before breakfast  senna 2 Tablet(s) Oral at bedtime  sodium chloride 0.9% lock flush 3 milliLiter(s) IV Push every 8 hours    MEDICATIONS  (PRN):  acetaminophen     Tablet .. 650 milliGRAM(s) Oral every 6 hours PRN Temp greater or equal to 38C (100.4F), Mild Pain (1 - 3), Moderate Pain (4 - 6) all other ROS negative except as per HPI

## 2024-03-06 NOTE — DIETITIAN INITIAL EVALUATION ADULT. - ETIOLOGY
[FreeTextEntry1] : 61 y/o M PMHx HTN, DLD, pulmonary nodules, thyroid mass s/p partial hemithyroidectomy, osteoarthritis and chronic low back pain/sciatica, L foot drop, s/p b/l hip replacement 2010 with residual L left length discrepancy, R foot pain s/p lido injection. Pt feels well overall but complains of constipation and bloating. He's been using dairy milk and products again recently. He also complains of a few hours a day of nasal congestion without rhinorrhea, itchiness or cough. He uses a nasal spray but doesn't know which one.   # Lung nodules - CT chest yearly ; Sep 2023 with New 4 to 5 mm right apical nodule series 304 image 45. Six-month follow-up chest CT is recommended. - Pt denies smoking.  - repeat CT chest  # Sciatica - Hold chiropractor (insurance won't cover both) - DC Cyclobenzaprine, c/w gabapentin, meloxicam and diclofenac gel - Ambulates independently - PT referral sent in May - Went to 5-6 visits, began doing exercises at home - Pain is controlled  # LLE muscle atrophy - As per pt due to slight leg discrepancy post b/l hip sx and sciatica - PT referral sent in May - Attended 5-6 visits, does exercises at home now - pain is controlled  # Thyroid mass s/p hemithyroidectomy, pathology positive for hyperplasia/no tumor seen - Follows w/ Dr. Yanez, last appt in June no changes to medications were made - TSH 3.7 Feb 2024 - C/w Levothyroxine  # CAD # HTN - Blood pressure readings at home 120's/80's - C/w Amlodipine 5mg, aspirin 81mg  # HO DLD - lipid panel Karla 2024 WNL - C/w rosuvastatin 10mg  # Pre DM - A1c was 5.6%,  5.5% Feb 2024 - Diet and weight loss advised  #Nasal congestion - c/w fluticasone and advised that pt can use OTC pseudoephed as needed when the congestion is bad  #Lactose intolerance #Reflux esophagitis - pt has been using lactose products recently and feeling boated. Advised avoidance of lactose products.  - stool infectious, ibd and malabsorptive workup previously unremarkable - EGD 8/21: irregular z line suspicious for barrettes but bx showed eosinophils only consistent w/ reflux esophagitis and no intestinal metaplasia or dysplasia, no duodenitis and no H. pylori - CF 8/21: inadquate prep - random bx non-revealing for microscopic colitis. 6 polyps removed w/ largest TA was 12mm in AC. Pt to have repeat colonoscopy d/t inadequate prep - Colonoscopy Feb 2022 - diverticula seen  # HCM - Last colonoscopy done in Feb 2023. - Tdap 2015, updated on flu, coved x2 - Pneumococcal vaccine at 64 - pt denies smoking  RTC 6 months with labs Chest CT 
Excessive energy intake with h/x of bariatric surgery

## 2024-03-19 ENCOUNTER — ASOB RESULT (OUTPATIENT)
Age: 69
End: 2024-03-19

## 2024-03-19 ENCOUNTER — APPOINTMENT (OUTPATIENT)
Dept: ANTEPARTUM | Facility: CLINIC | Age: 69
End: 2024-03-19
Payer: COMMERCIAL

## 2024-03-19 PROCEDURE — 76830 TRANSVAGINAL US NON-OB: CPT

## 2024-03-19 PROCEDURE — 76856 US EXAM PELVIC COMPLETE: CPT | Mod: 59

## 2024-04-03 ENCOUNTER — APPOINTMENT (OUTPATIENT)
Dept: VASCULAR SURGERY | Facility: CLINIC | Age: 69
End: 2024-04-03

## 2024-05-20 ENCOUNTER — NON-APPOINTMENT (OUTPATIENT)
Age: 69
End: 2024-05-20

## 2024-05-26 NOTE — DISCHARGE NOTE NURSING/CASE MANAGEMENT/SOCIAL WORK - NSDCPETBCESMAN_GEN_ALL_CORE
Over-the-counter Motrin or Tylenol as needed for pain.  Rest, elevate, ice 20 minutes on 20 minutes off as needed for comfort.  Work note.  Follow-up with orthopedic [place] if not better in 5 to 7 days.   If you are a smoker, it is important for your health to stop smoking. Please be aware that second hand smoke is also harmful.

## 2024-07-20 ENCOUNTER — EMERGENCY (EMERGENCY)
Facility: HOSPITAL | Age: 69
LOS: 0 days | Discharge: HOME | End: 2024-07-20
Attending: STUDENT IN AN ORGANIZED HEALTH CARE EDUCATION/TRAINING PROGRAM
Payer: COMMERCIAL

## 2024-07-20 VITALS
RESPIRATION RATE: 17 BRPM | HEART RATE: 69 BPM | OXYGEN SATURATION: 100 % | SYSTOLIC BLOOD PRESSURE: 126 MMHG | DIASTOLIC BLOOD PRESSURE: 56 MMHG | TEMPERATURE: 98 F

## 2024-07-20 VITALS — HEIGHT: 64 IN

## 2024-07-20 DIAGNOSIS — L03.116 CELLULITIS OF LEFT LOWER LIMB: ICD-10-CM

## 2024-07-20 DIAGNOSIS — Z98.84 BARIATRIC SURGERY STATUS: Chronic | ICD-10-CM

## 2024-07-20 DIAGNOSIS — Z88.6 ALLERGY STATUS TO ANALGESIC AGENT: ICD-10-CM

## 2024-07-20 DIAGNOSIS — I10 ESSENTIAL (PRIMARY) HYPERTENSION: ICD-10-CM

## 2024-07-20 DIAGNOSIS — J45.909 UNSPECIFIED ASTHMA, UNCOMPLICATED: ICD-10-CM

## 2024-07-20 DIAGNOSIS — M79.89 OTHER SPECIFIED SOFT TISSUE DISORDERS: ICD-10-CM

## 2024-07-20 DIAGNOSIS — L03.115 CELLULITIS OF RIGHT LOWER LIMB: ICD-10-CM

## 2024-07-20 LAB
ALBUMIN SERPL ELPH-MCNC: 3.4 G/DL — SIGNIFICANT CHANGE UP (ref 3.3–5)
ALP SERPL-CCNC: 114 U/L — SIGNIFICANT CHANGE UP (ref 40–120)
ALT FLD-CCNC: 13 U/L — SIGNIFICANT CHANGE UP (ref 12–78)
ANION GAP SERPL CALC-SCNC: 6 MMOL/L — SIGNIFICANT CHANGE UP (ref 5–17)
AST SERPL-CCNC: 13 U/L — LOW (ref 15–37)
BASOPHILS # BLD AUTO: 0.04 K/UL — SIGNIFICANT CHANGE UP (ref 0–0.2)
BASOPHILS NFR BLD AUTO: 0.5 % — SIGNIFICANT CHANGE UP (ref 0–2)
BILIRUB SERPL-MCNC: 0.3 MG/DL — SIGNIFICANT CHANGE UP (ref 0.2–1.2)
BUN SERPL-MCNC: 9 MG/DL — SIGNIFICANT CHANGE UP (ref 7–23)
CALCIUM SERPL-MCNC: 9 MG/DL — SIGNIFICANT CHANGE UP (ref 8.5–10.1)
CHLORIDE SERPL-SCNC: 113 MMOL/L — HIGH (ref 96–108)
CO2 SERPL-SCNC: 25 MMOL/L — SIGNIFICANT CHANGE UP (ref 22–31)
CREAT SERPL-MCNC: 0.53 MG/DL — SIGNIFICANT CHANGE UP (ref 0.5–1.3)
D DIMER BLD IA.RAPID-MCNC: 427 NG/ML DDU — HIGH
EGFR: 99 ML/MIN/1.73M2 — SIGNIFICANT CHANGE UP
EGFR: 99 ML/MIN/1.73M2 — SIGNIFICANT CHANGE UP
EOSINOPHIL # BLD AUTO: 0.2 K/UL — SIGNIFICANT CHANGE UP (ref 0–0.5)
EOSINOPHIL NFR BLD AUTO: 2.5 % — SIGNIFICANT CHANGE UP (ref 0–6)
GLUCOSE SERPL-MCNC: 91 MG/DL — SIGNIFICANT CHANGE UP (ref 70–99)
HCT VFR BLD CALC: 36.9 % — SIGNIFICANT CHANGE UP (ref 34.5–45)
HGB BLD-MCNC: 11.6 G/DL — SIGNIFICANT CHANGE UP (ref 11.5–15.5)
IMM GRANULOCYTES NFR BLD AUTO: 0.4 % — SIGNIFICANT CHANGE UP (ref 0–0.9)
LACTATE SERPL-SCNC: 0.9 MMOL/L — SIGNIFICANT CHANGE UP (ref 0.7–2)
LYMPHOCYTES # BLD AUTO: 2.11 K/UL — SIGNIFICANT CHANGE UP (ref 1–3.3)
LYMPHOCYTES # BLD AUTO: 25.9 % — SIGNIFICANT CHANGE UP (ref 13–44)
MCHC RBC-ENTMCNC: 27.2 PG — SIGNIFICANT CHANGE UP (ref 27–34)
MCHC RBC-ENTMCNC: 31.4 GM/DL — LOW (ref 32–36)
MCV RBC AUTO: 86.4 FL — SIGNIFICANT CHANGE UP (ref 80–100)
MONOCYTES # BLD AUTO: 0.74 K/UL — SIGNIFICANT CHANGE UP (ref 0–0.9)
MONOCYTES NFR BLD AUTO: 9.1 % — SIGNIFICANT CHANGE UP (ref 2–14)
NEUTROPHILS # BLD AUTO: 5.03 K/UL — SIGNIFICANT CHANGE UP (ref 1.8–7.4)
NEUTROPHILS NFR BLD AUTO: 61.6 % — SIGNIFICANT CHANGE UP (ref 43–77)
NT-PROBNP SERPL-SCNC: 73 PG/ML — SIGNIFICANT CHANGE UP (ref 0–125)
PLATELET # BLD AUTO: 305 K/UL — SIGNIFICANT CHANGE UP (ref 150–400)
POTASSIUM SERPL-MCNC: 3.7 MMOL/L — SIGNIFICANT CHANGE UP (ref 3.5–5.3)
POTASSIUM SERPL-SCNC: 3.7 MMOL/L — SIGNIFICANT CHANGE UP (ref 3.5–5.3)
PROT SERPL-MCNC: 7.4 GM/DL — SIGNIFICANT CHANGE UP (ref 6–8.3)
RBC # BLD: 4.27 M/UL — SIGNIFICANT CHANGE UP (ref 3.8–5.2)
RBC # FLD: 14.8 % — HIGH (ref 10.3–14.5)
SODIUM SERPL-SCNC: 144 MMOL/L — SIGNIFICANT CHANGE UP (ref 135–145)
TROPONIN I, HIGH SENSITIVITY RESULT: 4.16 NG/L — SIGNIFICANT CHANGE UP
WBC # BLD: 8.15 K/UL — SIGNIFICANT CHANGE UP (ref 3.8–10.5)
WBC # FLD AUTO: 8.15 K/UL — SIGNIFICANT CHANGE UP (ref 3.8–10.5)

## 2024-07-20 PROCEDURE — 71045 X-RAY EXAM CHEST 1 VIEW: CPT

## 2024-07-20 PROCEDURE — 83880 ASSAY OF NATRIURETIC PEPTIDE: CPT

## 2024-07-20 PROCEDURE — 73590 X-RAY EXAM OF LOWER LEG: CPT | Mod: 26,50

## 2024-07-20 PROCEDURE — 93970 EXTREMITY STUDY: CPT

## 2024-07-20 PROCEDURE — 99285 EMERGENCY DEPT VISIT HI MDM: CPT | Mod: 25

## 2024-07-20 PROCEDURE — 36415 COLL VENOUS BLD VENIPUNCTURE: CPT

## 2024-07-20 PROCEDURE — 71045 X-RAY EXAM CHEST 1 VIEW: CPT | Mod: 26

## 2024-07-20 PROCEDURE — 93010 ELECTROCARDIOGRAM REPORT: CPT

## 2024-07-20 PROCEDURE — 84484 ASSAY OF TROPONIN QUANT: CPT

## 2024-07-20 PROCEDURE — 87040 BLOOD CULTURE FOR BACTERIA: CPT

## 2024-07-20 PROCEDURE — 99285 EMERGENCY DEPT VISIT HI MDM: CPT

## 2024-07-20 PROCEDURE — 85379 FIBRIN DEGRADATION QUANT: CPT

## 2024-07-20 PROCEDURE — 93005 ELECTROCARDIOGRAM TRACING: CPT

## 2024-07-20 PROCEDURE — 71275 CT ANGIOGRAPHY CHEST: CPT | Mod: MC

## 2024-07-20 PROCEDURE — 73590 X-RAY EXAM OF LOWER LEG: CPT | Mod: 50

## 2024-07-20 PROCEDURE — 83605 ASSAY OF LACTIC ACID: CPT

## 2024-07-20 PROCEDURE — 96374 THER/PROPH/DIAG INJ IV PUSH: CPT | Mod: XU

## 2024-07-20 PROCEDURE — 71275 CT ANGIOGRAPHY CHEST: CPT | Mod: 26,MC

## 2024-07-20 PROCEDURE — 93970 EXTREMITY STUDY: CPT | Mod: 26

## 2024-07-20 PROCEDURE — 85025 COMPLETE CBC W/AUTO DIFF WBC: CPT

## 2024-07-20 PROCEDURE — 80053 COMPREHEN METABOLIC PANEL: CPT

## 2024-07-20 RX ORDER — CEFTRIAXONE 500 MG/1
1000 INJECTION, POWDER, FOR SOLUTION INTRAMUSCULAR; INTRAVENOUS ONCE
Refills: 0 | Status: COMPLETED | OUTPATIENT
Start: 2024-07-20 | End: 2024-07-20

## 2024-07-20 RX ORDER — CEFTRIAXONE 500 MG/1
1000 INJECTION, POWDER, FOR SOLUTION INTRAMUSCULAR; INTRAVENOUS ONCE
Refills: 0 | Status: DISCONTINUED | OUTPATIENT
Start: 2024-07-20 | End: 2024-07-20

## 2024-07-20 RX ORDER — SULFAMETHOXAZOLE/TRIMETHOPRIM 800-160 MG
1 TABLET ORAL
Qty: 10 | Refills: 0
Start: 2024-07-20 | End: 2024-07-24

## 2024-07-20 RX ADMIN — CEFTRIAXONE 1000 MILLIGRAM(S): 500 INJECTION, POWDER, FOR SOLUTION INTRAMUSCULAR; INTRAVENOUS at 11:01

## 2024-07-20 RX ADMIN — Medication 250 MILLILITER(S): at 11:01

## 2024-08-15 NOTE — ED ADULT TRIAGE NOTE - PATIENT ON (OXYGEN DELIVERY METHOD)
"Pt here for Mediport Flush. Right chestwall mediport accessed with a 20g 1" tomlin via sterile technique.  Excellent blood return noted.  Flushed with 10ml NS and 5 ml heparin solution.  Needle D/C, site without redness, swelling, or drainage noted.  Dressing applied.  Patient tolerated well.  Patient to return to clinic in 4 weeks.  " room air

## 2024-11-12 ENCOUNTER — APPOINTMENT (OUTPATIENT)
Dept: NEUROLOGY | Facility: CLINIC | Age: 69
End: 2024-11-12

## 2025-02-24 ENCOUNTER — INPATIENT (INPATIENT)
Facility: HOSPITAL | Age: 70
LOS: 3 days | Discharge: HOME | DRG: 556 | End: 2025-02-28
Attending: STUDENT IN AN ORGANIZED HEALTH CARE EDUCATION/TRAINING PROGRAM | Admitting: INTERNAL MEDICINE
Payer: COMMERCIAL

## 2025-02-24 VITALS
DIASTOLIC BLOOD PRESSURE: 99 MMHG | RESPIRATION RATE: 18 BRPM | TEMPERATURE: 98 F | SYSTOLIC BLOOD PRESSURE: 112 MMHG | HEART RATE: 108 BPM | OXYGEN SATURATION: 100 % | WEIGHT: 210.1 LBS

## 2025-02-24 DIAGNOSIS — Z98.84 BARIATRIC SURGERY STATUS: Chronic | ICD-10-CM

## 2025-02-24 LAB
BASOPHILS # BLD AUTO: 0.06 K/UL — SIGNIFICANT CHANGE UP (ref 0–0.2)
BASOPHILS NFR BLD AUTO: 0.5 % — SIGNIFICANT CHANGE UP (ref 0–2)
EOSINOPHIL # BLD AUTO: 0.26 K/UL — SIGNIFICANT CHANGE UP (ref 0–0.5)
EOSINOPHIL NFR BLD AUTO: 2.3 % — SIGNIFICANT CHANGE UP (ref 0–6)
HCT VFR BLD CALC: 39.6 % — SIGNIFICANT CHANGE UP (ref 34.5–45)
HGB BLD-MCNC: 12.6 G/DL — SIGNIFICANT CHANGE UP (ref 11.5–15.5)
IMM GRANULOCYTES # BLD AUTO: 0.05 K/UL — SIGNIFICANT CHANGE UP (ref 0–0.07)
IMM GRANULOCYTES NFR BLD AUTO: 0.4 % — SIGNIFICANT CHANGE UP (ref 0–0.9)
LYMPHOCYTES # BLD AUTO: 1.93 K/UL — SIGNIFICANT CHANGE UP (ref 1–3.3)
LYMPHOCYTES NFR BLD AUTO: 17.3 % — SIGNIFICANT CHANGE UP (ref 13–44)
MCHC RBC-ENTMCNC: 28.7 PG — SIGNIFICANT CHANGE UP (ref 27–34)
MCHC RBC-ENTMCNC: 31.8 G/DL — LOW (ref 32–36)
MCV RBC AUTO: 90.2 FL — SIGNIFICANT CHANGE UP (ref 80–100)
MONOCYTES # BLD AUTO: 0.98 K/UL — HIGH (ref 0–0.9)
MONOCYTES NFR BLD AUTO: 8.8 % — SIGNIFICANT CHANGE UP (ref 2–14)
NEUTROPHILS # BLD AUTO: 7.85 K/UL — HIGH (ref 1.8–7.4)
NEUTROPHILS NFR BLD AUTO: 70.7 % — SIGNIFICANT CHANGE UP (ref 43–77)
NRBC # BLD AUTO: 0 K/UL — SIGNIFICANT CHANGE UP (ref 0–0)
NRBC # FLD: 0 K/UL — SIGNIFICANT CHANGE UP (ref 0–0)
NRBC BLD AUTO-RTO: 0 /100 WBCS — SIGNIFICANT CHANGE UP (ref 0–0)
PLATELET # BLD AUTO: 292 K/UL — SIGNIFICANT CHANGE UP (ref 150–400)
PMV BLD: 9.6 FL — SIGNIFICANT CHANGE UP (ref 7–13)
RBC # BLD: 4.39 M/UL — SIGNIFICANT CHANGE UP (ref 3.8–5.2)
RBC # FLD: 14.5 % — SIGNIFICANT CHANGE UP (ref 10.3–14.5)
WBC # BLD: 11.13 K/UL — HIGH (ref 3.8–10.5)
WBC # FLD AUTO: 11.13 K/UL — HIGH (ref 3.8–10.5)

## 2025-02-24 PROCEDURE — 99285 EMERGENCY DEPT VISIT HI MDM: CPT

## 2025-02-24 RX ORDER — ONDANSETRON HCL/PF 4 MG/2 ML
4 VIAL (ML) INJECTION ONCE
Refills: 0 | Status: COMPLETED | OUTPATIENT
Start: 2025-02-24 | End: 2025-02-24

## 2025-02-24 RX ORDER — ACETAMINOPHEN 500 MG/5ML
1000 LIQUID (ML) ORAL ONCE
Refills: 0 | Status: COMPLETED | OUTPATIENT
Start: 2025-02-24 | End: 2025-02-24

## 2025-02-24 RX ADMIN — Medication 400 MILLIGRAM(S): at 23:41

## 2025-02-24 RX ADMIN — Medication 4 MILLIGRAM(S): at 23:41

## 2025-02-24 NOTE — ED PROVIDER NOTE - OBJECTIVE STATEMENT
68 y/o F with PMHx of HTN, PVD, chronic lymphedema, sleep apnea, asthma, lap-band surgery, appendectomy presents to the ED c/o acute on chronic b/l LE pain. States the pain is severe tonight. Reports pain radiates from her feet up to b/l hips. States pain is worse in her L leg. Pt states that after work this evening she started to have difficulty walking secondary to pain. Also endorsing lower abd pain. Denies any chest pain or SOB. Pt is not on any AC meds. Seen in the ED in July 2024 with similar sx and had negative venous duplexes and a negative CTA chest at that time. Allergic to ASA.

## 2025-02-24 NOTE — ED PROVIDER NOTE - PHYSICAL EXAMINATION
CONSTITUTIONAL: Well appearing, awake, alert, oriented to person, place, time/situation and in no apparent distress.  · ENMT: Airway patent, Nasal mucosa clear. Mouth with normal mucosa. Throat has no vesicles, no oropharyngeal exudates and uvula is midline.  · EYES: Clear bilaterally, pupils equal, round and reactive to light.  · CARDIAC: Normal rate, regular rhythm.  Heart sounds S1, S2.  No murmurs, rubs or gallops.  · RESPIRATORY: Breath sounds clear and equal bilaterally.  · GASTROINTESTINAL: Abdomen soft, b/l lower abd ttp, no guarding.  · MUSCULOSKELETAL: Spine appears normal, range of motion is not limited. b/l legs ttp, b/l lymphedema with chronic venous stasis dermatitis, b/l dp pulses intact, cap refill apporx. 3 seconds  · NEUROLOGICAL: Alert and oriented, no focal deficits, no motor or sensory deficits.  · SKIN: Skin normal color for race, warm, dry and intact. No evidence of rash

## 2025-02-24 NOTE — ED ADULT TRIAGE NOTE - CHIEF COMPLAINT QUOTE
BIBEMS from home c/o b/l leg pain for 3x days worsening tonight. Reports pain radiates up from feet towards hip, worse on the left side. Took 2x Meloxicam and ibuprofen at 2000 w/ no relief. Able to move extremities, unable to ambulate r/t pain. Denies chest pain, SOB.

## 2025-02-25 DIAGNOSIS — M79.606 PAIN IN LEG, UNSPECIFIED: ICD-10-CM

## 2025-02-25 LAB
ADD ON TEST-SPECIMEN IN LAB: SIGNIFICANT CHANGE UP
ADD ON TEST-SPECIMEN IN LAB: SIGNIFICANT CHANGE UP
ALBUMIN SERPL ELPH-MCNC: 3.2 G/DL — LOW (ref 3.3–5)
ALBUMIN SERPL ELPH-MCNC: 3.5 G/DL — SIGNIFICANT CHANGE UP (ref 3.3–5)
ALP SERPL-CCNC: 105 U/L — SIGNIFICANT CHANGE UP (ref 40–120)
ALP SERPL-CCNC: 113 U/L — SIGNIFICANT CHANGE UP (ref 40–120)
ALT FLD-CCNC: 18 U/L — SIGNIFICANT CHANGE UP (ref 12–78)
ALT FLD-CCNC: 19 U/L — SIGNIFICANT CHANGE UP (ref 12–78)
ANION GAP SERPL CALC-SCNC: 3 MMOL/L — LOW (ref 5–17)
ANION GAP SERPL CALC-SCNC: 4 MMOL/L — LOW (ref 5–17)
APPEARANCE UR: CLEAR — SIGNIFICANT CHANGE UP
APTT BLD: 34.1 SEC — SIGNIFICANT CHANGE UP (ref 24.5–35.6)
AST SERPL-CCNC: 12 U/L — LOW (ref 15–37)
AST SERPL-CCNC: 6 U/L — LOW (ref 15–37)
BILIRUB SERPL-MCNC: 0.3 MG/DL — SIGNIFICANT CHANGE UP (ref 0.2–1.2)
BILIRUB SERPL-MCNC: 0.5 MG/DL — SIGNIFICANT CHANGE UP (ref 0.2–1.2)
BILIRUB UR-MCNC: NEGATIVE — SIGNIFICANT CHANGE UP
BLD GP AB SCN SERPL QL: SIGNIFICANT CHANGE UP
BUN SERPL-MCNC: 11 MG/DL — SIGNIFICANT CHANGE UP (ref 7–23)
BUN SERPL-MCNC: 9 MG/DL — SIGNIFICANT CHANGE UP (ref 7–23)
CALCIUM SERPL-MCNC: 8.8 MG/DL — SIGNIFICANT CHANGE UP (ref 8.5–10.1)
CALCIUM SERPL-MCNC: 9.3 MG/DL — SIGNIFICANT CHANGE UP (ref 8.5–10.1)
CHLORIDE SERPL-SCNC: 110 MMOL/L — HIGH (ref 96–108)
CHLORIDE SERPL-SCNC: 112 MMOL/L — HIGH (ref 96–108)
CHOLEST SERPL-MCNC: 152 MG/DL — SIGNIFICANT CHANGE UP
CK SERPL-CCNC: 61 U/L — SIGNIFICANT CHANGE UP (ref 26–192)
CO2 SERPL-SCNC: 27 MMOL/L — SIGNIFICANT CHANGE UP (ref 22–31)
CO2 SERPL-SCNC: 29 MMOL/L — SIGNIFICANT CHANGE UP (ref 22–31)
COLOR SPEC: YELLOW — SIGNIFICANT CHANGE UP
CREAT SERPL-MCNC: 0.54 MG/DL — SIGNIFICANT CHANGE UP (ref 0.5–1.3)
CREAT SERPL-MCNC: 0.66 MG/DL — SIGNIFICANT CHANGE UP (ref 0.5–1.3)
CRP SERPL-MCNC: 9.6 MG/ML — HIGH (ref 0–5)
DIFF PNL FLD: NEGATIVE — SIGNIFICANT CHANGE UP
EGFR: 94 ML/MIN/1.73M2 — SIGNIFICANT CHANGE UP
EGFR: 99 ML/MIN/1.73M2 — SIGNIFICANT CHANGE UP
ERYTHROCYTE [SEDIMENTATION RATE] IN BLOOD: 18 MM/HR — SIGNIFICANT CHANGE UP (ref 0–20)
GLUCOSE SERPL-MCNC: 112 MG/DL — HIGH (ref 70–99)
GLUCOSE SERPL-MCNC: 79 MG/DL — SIGNIFICANT CHANGE UP (ref 70–99)
GLUCOSE UR QL: NEGATIVE MG/DL — SIGNIFICANT CHANGE UP
HCT VFR BLD CALC: 37.1 % — SIGNIFICANT CHANGE UP (ref 34.5–45)
HDLC SERPL-MCNC: 63 MG/DL — SIGNIFICANT CHANGE UP
HGB BLD-MCNC: 11.4 G/DL — LOW (ref 11.5–15.5)
INR BLD: 0.94 RATIO — SIGNIFICANT CHANGE UP (ref 0.85–1.16)
KETONES UR-MCNC: NEGATIVE MG/DL — SIGNIFICANT CHANGE UP
LEUKOCYTE ESTERASE UR-ACNC: NEGATIVE — SIGNIFICANT CHANGE UP
LIPID PNL WITH DIRECT LDL SERPL: 77 MG/DL — SIGNIFICANT CHANGE UP
MAGNESIUM SERPL-MCNC: 2.3 MG/DL — SIGNIFICANT CHANGE UP (ref 1.6–2.6)
MCHC RBC-ENTMCNC: 28.2 PG — SIGNIFICANT CHANGE UP (ref 27–34)
MCHC RBC-ENTMCNC: 30.7 G/DL — LOW (ref 32–36)
MCV RBC AUTO: 91.8 FL — SIGNIFICANT CHANGE UP (ref 80–100)
NITRITE UR-MCNC: NEGATIVE — SIGNIFICANT CHANGE UP
NON HDL CHOLESTEROL: 88 MG/DL — SIGNIFICANT CHANGE UP
NRBC # BLD AUTO: 0 K/UL — SIGNIFICANT CHANGE UP (ref 0–0)
NRBC # FLD: 0 K/UL — SIGNIFICANT CHANGE UP (ref 0–0)
NRBC BLD AUTO-RTO: 0 /100 WBCS — SIGNIFICANT CHANGE UP (ref 0–0)
NT-PROBNP SERPL-SCNC: 132 PG/ML — HIGH (ref 0–125)
PH UR: 6 — SIGNIFICANT CHANGE UP (ref 5–8)
PHOSPHATE SERPL-MCNC: 4.2 MG/DL — SIGNIFICANT CHANGE UP (ref 2.5–4.5)
PLATELET # BLD AUTO: 258 K/UL — SIGNIFICANT CHANGE UP (ref 150–400)
PMV BLD: 10 FL — SIGNIFICANT CHANGE UP (ref 7–13)
POTASSIUM SERPL-MCNC: 3.9 MMOL/L — SIGNIFICANT CHANGE UP (ref 3.5–5.3)
POTASSIUM SERPL-MCNC: 4.3 MMOL/L — SIGNIFICANT CHANGE UP (ref 3.5–5.3)
POTASSIUM SERPL-SCNC: 3.9 MMOL/L — SIGNIFICANT CHANGE UP (ref 3.5–5.3)
POTASSIUM SERPL-SCNC: 4.3 MMOL/L — SIGNIFICANT CHANGE UP (ref 3.5–5.3)
PROT SERPL-MCNC: 6.8 GM/DL — SIGNIFICANT CHANGE UP (ref 6–8.3)
PROT SERPL-MCNC: 7.5 GM/DL — SIGNIFICANT CHANGE UP (ref 6–8.3)
PROT UR-MCNC: SIGNIFICANT CHANGE UP MG/DL
PROTHROM AB SERPL-ACNC: 11.1 SEC — SIGNIFICANT CHANGE UP (ref 9.9–13.4)
RBC # BLD: 4.04 M/UL — SIGNIFICANT CHANGE UP (ref 3.8–5.2)
RBC # FLD: 14.7 % — HIGH (ref 10.3–14.5)
SODIUM SERPL-SCNC: 142 MMOL/L — SIGNIFICANT CHANGE UP (ref 135–145)
SODIUM SERPL-SCNC: 143 MMOL/L — SIGNIFICANT CHANGE UP (ref 135–145)
SP GR SPEC: 1 — SIGNIFICANT CHANGE UP (ref 1–1.03)
TRIGL SERPL-MCNC: 52 MG/DL — SIGNIFICANT CHANGE UP
TROPONIN I, HIGH SENSITIVITY RESULT: 4.26 NG/L — SIGNIFICANT CHANGE UP
UROBILINOGEN FLD QL: 1 MG/DL — SIGNIFICANT CHANGE UP (ref 0.2–1)
WBC # BLD: 9.89 K/UL — SIGNIFICANT CHANGE UP (ref 3.8–10.5)
WBC # FLD AUTO: 9.89 K/UL — SIGNIFICANT CHANGE UP (ref 3.8–10.5)

## 2025-02-25 PROCEDURE — 84100 ASSAY OF PHOSPHORUS: CPT

## 2025-02-25 PROCEDURE — 36415 COLL VENOUS BLD VENIPUNCTURE: CPT

## 2025-02-25 PROCEDURE — 97116 GAIT TRAINING THERAPY: CPT | Mod: GP

## 2025-02-25 PROCEDURE — 81003 URINALYSIS AUTO W/O SCOPE: CPT

## 2025-02-25 PROCEDURE — 87086 URINE CULTURE/COLONY COUNT: CPT

## 2025-02-25 PROCEDURE — 93970 EXTREMITY STUDY: CPT | Mod: 26

## 2025-02-25 PROCEDURE — 80053 COMPREHEN METABOLIC PANEL: CPT

## 2025-02-25 PROCEDURE — 80048 BASIC METABOLIC PNL TOTAL CA: CPT

## 2025-02-25 PROCEDURE — 83735 ASSAY OF MAGNESIUM: CPT

## 2025-02-25 PROCEDURE — 97530 THERAPEUTIC ACTIVITIES: CPT | Mod: GP

## 2025-02-25 PROCEDURE — 75635 CT ANGIO ABDOMINAL ARTERIES: CPT | Mod: 26

## 2025-02-25 PROCEDURE — 85027 COMPLETE CBC AUTOMATED: CPT

## 2025-02-25 PROCEDURE — 87186 SC STD MICRODIL/AGAR DIL: CPT

## 2025-02-25 PROCEDURE — 97163 PT EVAL HIGH COMPLEX 45 MIN: CPT | Mod: GP

## 2025-02-25 PROCEDURE — 86140 C-REACTIVE PROTEIN: CPT

## 2025-02-25 PROCEDURE — 85652 RBC SED RATE AUTOMATED: CPT

## 2025-02-25 PROCEDURE — 99222 1ST HOSP IP/OBS MODERATE 55: CPT

## 2025-02-25 PROCEDURE — 80061 LIPID PANEL: CPT

## 2025-02-25 PROCEDURE — 87077 CULTURE AEROBIC IDENTIFY: CPT

## 2025-02-25 RX ORDER — ONDANSETRON HCL/PF 4 MG/2 ML
4 VIAL (ML) INJECTION EVERY 8 HOURS
Refills: 0 | Status: DISCONTINUED | OUTPATIENT
Start: 2025-02-25 | End: 2025-02-28

## 2025-02-25 RX ORDER — METHOCARBAMOL 500 MG/1
500 TABLET, FILM COATED ORAL THREE TIMES A DAY
Refills: 0 | Status: DISCONTINUED | OUTPATIENT
Start: 2025-02-25 | End: 2025-02-28

## 2025-02-25 RX ORDER — MELATONIN 5 MG
3 TABLET ORAL AT BEDTIME
Refills: 0 | Status: DISCONTINUED | OUTPATIENT
Start: 2025-02-25 | End: 2025-02-28

## 2025-02-25 RX ORDER — PREGABALIN 50 MG/1
50 CAPSULE ORAL THREE TIMES A DAY
Refills: 0 | Status: DISCONTINUED | OUTPATIENT
Start: 2025-02-25 | End: 2025-02-28

## 2025-02-25 RX ORDER — HEPARIN SODIUM 1000 [USP'U]/ML
5000 INJECTION INTRAVENOUS; SUBCUTANEOUS EVERY 8 HOURS
Refills: 0 | Status: DISCONTINUED | OUTPATIENT
Start: 2025-02-25 | End: 2025-02-28

## 2025-02-25 RX ORDER — DOXYCYCLINE HYCLATE 100 MG
100 TABLET ORAL ONCE
Refills: 0 | Status: COMPLETED | OUTPATIENT
Start: 2025-02-25 | End: 2025-02-25

## 2025-02-25 RX ORDER — MAGNESIUM, ALUMINUM HYDROXIDE 200-200 MG
30 TABLET,CHEWABLE ORAL EVERY 4 HOURS
Refills: 0 | Status: DISCONTINUED | OUTPATIENT
Start: 2025-02-25 | End: 2025-02-28

## 2025-02-25 RX ORDER — FUROSEMIDE 10 MG/ML
20 INJECTION INTRAMUSCULAR; INTRAVENOUS ONCE
Refills: 0 | Status: COMPLETED | OUTPATIENT
Start: 2025-02-25 | End: 2025-02-25

## 2025-02-25 RX ORDER — INFLUENZA A VIRUS A/IDAHO/07/2018 (H1N1) ANTIGEN (MDCK CELL DERIVED, PROPIOLACTONE INACTIVATED, INFLUENZA A VIRUS A/INDIANA/08/2018 (H3N2) ANTIGEN (MDCK CELL DERIVED, PROPIOLACTONE INACTIVATED), INFLUENZA B VIRUS B/SINGAPORE/INFTT-16-0610/2016 ANTIGEN (MDCK CELL DERIVED, PROPIOLACTONE INACTIVATED), INFLUENZA B VIRUS B/IOWA/06/2017 ANTIGEN (MDCK CELL DERIVED, PROPIOLACTONE INACTIVATED) 15; 15; 15; 15 UG/.5ML; UG/.5ML; UG/.5ML; UG/.5ML
0.5 INJECTION, SUSPENSION INTRAMUSCULAR ONCE
Refills: 0 | Status: DISCONTINUED | OUTPATIENT
Start: 2025-02-25 | End: 2025-02-28

## 2025-02-25 RX ORDER — ACETAMINOPHEN 500 MG/5ML
650 LIQUID (ML) ORAL EVERY 6 HOURS
Refills: 0 | Status: DISCONTINUED | OUTPATIENT
Start: 2025-02-25 | End: 2025-02-28

## 2025-02-25 RX ADMIN — HEPARIN SODIUM 5000 UNIT(S): 1000 INJECTION INTRAVENOUS; SUBCUTANEOUS at 13:52

## 2025-02-25 RX ADMIN — Medication 40 MILLIEQUIVALENT(S): at 21:55

## 2025-02-25 RX ADMIN — METHOCARBAMOL 500 MILLIGRAM(S): 500 TABLET, FILM COATED ORAL at 13:51

## 2025-02-25 RX ADMIN — PREGABALIN 50 MILLIGRAM(S): 50 CAPSULE ORAL at 04:27

## 2025-02-25 RX ADMIN — METHOCARBAMOL 500 MILLIGRAM(S): 500 TABLET, FILM COATED ORAL at 21:55

## 2025-02-25 RX ADMIN — METHOCARBAMOL 500 MILLIGRAM(S): 500 TABLET, FILM COATED ORAL at 04:27

## 2025-02-25 RX ADMIN — Medication 100 MILLIGRAM(S): at 01:54

## 2025-02-25 RX ADMIN — FUROSEMIDE 20 MILLIGRAM(S): 10 INJECTION INTRAMUSCULAR; INTRAVENOUS at 15:42

## 2025-02-25 RX ADMIN — PREGABALIN 50 MILLIGRAM(S): 50 CAPSULE ORAL at 21:56

## 2025-02-25 RX ADMIN — PREGABALIN 50 MILLIGRAM(S): 50 CAPSULE ORAL at 13:51

## 2025-02-25 RX ADMIN — Medication 100 MILLILITER(S): at 04:27

## 2025-02-25 RX ADMIN — Medication 4 MILLIGRAM(S): at 02:49

## 2025-02-25 RX ADMIN — HEPARIN SODIUM 5000 UNIT(S): 1000 INJECTION INTRAVENOUS; SUBCUTANEOUS at 21:56

## 2025-02-25 NOTE — H&P ADULT - NSHPPHYSICALEXAM_GEN_ALL_CORE
T(C): 36.6 (02-24-25 @ 22:15), Max: 36.6 (02-24-25 @ 22:15)  HR: 108 (02-24-25 @ 22:15) (108 - 108)  BP: 112/99 (02-24-25 @ 22:15) (112/99 - 112/99)  RR: 18 (02-24-25 @ 22:15) (18 - 18)  SpO2: 100% (02-24-25 @ 22:15) (100% - 100%)    General: Obese  HEENT: non-traumatic, perrla, eomi  Cardio: s1s2 regular rate and rhythm  Lungs: comfortable breathing, clear to auscultation  Abdomen: Soft, supra-pubic tenderness.   Neuro: AOx4  Ext: b/l le stasis dermatitis, lichenified skin in LE.

## 2025-02-25 NOTE — ED ADULT NURSE NOTE - NS ED NURSE REPORT GIVEN TO FT
Double M-Plasty Intermediate Repair Preamble Text (Leave Blank If You Do Not Want): Undermining was performed with blunt dissection. CHELSIE RN

## 2025-02-25 NOTE — DIETITIAN INITIAL EVALUATION ADULT - ORAL INTAKE PTA/DIET HISTORY
Lives w/  and children. Endorses "ok" appetite at home PTA. Reports following a low salt/ GOLDEN diet at home. States portion sizes of meals are small. Denies difficulties w/ cooking and grocery shopping. C/o occasional constipation. Likely meeting >75% ENN.

## 2025-02-25 NOTE — ED ADULT NURSE NOTE - OBJECTIVE STATEMENT
pt c/o b/l leg pain, worse on the left side. pt reports this has been going on for a couple of weeks but worsened tonight and was unable to ambulate after work. pt endorsing pain and weakness. Pt denies CP/SOB, N/V/D, fever/chills, dizziness. no other complaints at this time. safety and comfort measures maintained. md at bedside

## 2025-02-25 NOTE — H&P ADULT - ASSESSMENT
69F admitted for LE pain likely 2/2 PAD vs MSK pain vs cellulitis?    #LE pain likely 2/2 PAD vs MSK pain vs cellulitis?  -She has b/l erythema around her ankles, bright red, also tender.  -WBC - 11  -CT angio of abdominal aorta preliminary read noted above.   -US dopplers negative for DVT  -S/p doxycycline in ED  -Suspect patients symptoms more likely 2/2 MSK vs PAD than cellulitis  -Will hold antibiotics for now. Obtain ESR/CRP   -Multimodal pain therapy - Trial of Lyrica and Robaxin  -Vascular consult  -PT eval    #Suprapubic tenderness + malodorous urine, eval for UTI  -Obtain UA and UClx.  -Patient not septic, not febrile, mild wbc of 11.   -Hold abx for now.     #HTN  -PTA - HCTZ  -C/w home med    #DVT ppx: Hepsubq  Fall precautions  Turn q2h  Skin checks as per RN.    69F admitted for LE pain likely 2/2 PAD vs MSK pain vs cellulitis?    #LE pain likely 2/2 PAD vs MSK pain vs cellulitis?  -She has b/l erythema around her ankles, bright red, also tender.  -WBC - 11  -CT angio of abdominal aorta preliminary read noted above.   -US dopplers negative for DVT  -S/p doxycycline in ED  -Suspect patients symptoms more likely 2/2 MSK vs PAD than cellulitis  -Will hold antibiotics for now. Obtain ESR/CRP   -Multimodal pain therapy - Trial of Lyrica and Robaxin  -Vascular consult  -PT eval    #Suprapubic tenderness + malodorous urine, eval for UTI  -Obtain UA and UClx.  -Patient not septic, not febrile, mild wbc of 11.   -Hold abx for now.     #HTN  -Reports being on HCTZ, unsure of dose.   -Normotensive, monitor for now  -Confirm meds in am.     #DVT ppx: Hepsubq  Fall precautions  Turn q2h  Skin checks as per RN.    69F admitted for LE pain likely 2/2 PAD vs MSK pain vs cellulitis?    #LE pain likely 2/2 PAD vs MSK pain vs cellulitis?  -She has b/l erythema around her ankles, bright red, also tender.  -WBC - 11  -CT angio of abdominal aorta preliminary read noted above.   -US dopplers negative for DVT  -S/p doxycycline in ED  -Suspect patients symptoms more likely 2/2 MSK vs PAD than cellulitis  -Will hold antibiotics for now. Obtain ESR/CRP, ID consult for eval.   -Multimodal pain therapy - Trial of Lyrica and Robaxin  -Vascular consult  -PT eval    #Suprapubic tenderness + malodorous urine, eval for UTI  -Obtain UA and UClx.  -Patient not septic, not febrile, mild wbc of 11.   -Hold abx for now.     #HTN  -Reports being on HCTZ, unsure of dose.   -Normotensive, monitor for now  -Confirm meds in am.     #DVT ppx: Hepsubq  Fall precautions  Turn q2h  Skin checks as per RN.

## 2025-02-25 NOTE — ED ADULT NURSE NOTE - SUICIDE SCREENING QUESTION 2
Pt called. Stated she is seeing Kalani on 4/17 and wanted to give her some info before appt-      1. Pt is not going to do Dexa right now  2. Pt will be doing mammo at , where she has been going for 25yrs  3. Pt will be bringing 2 CT reports of the lung, which were abnormal. Pt wants Kalani to look at them  4. Pt having labs drawn on 4/12    
noted  
No

## 2025-02-25 NOTE — PATIENT PROFILE ADULT - NSPROGENSOURCEINFO_GEN_A_NUR
Chelsea  is a 27 y.o.  @16w3d who presents for routine prenatal visit.    Denies OB complaints.     NIPT - low risk  AFP -  ordered   Level II - scheduled      TWG -0.635 kg (-1 lb 6.4 oz)    Has not yet appreciated fetal movement.  Denies contractions, cramping, leakage of fluid or vaginal bleeding.     Reviewed PTL precautions and reasons to call.     patient

## 2025-02-25 NOTE — DIETITIAN INITIAL EVALUATION ADULT - OTHER INFO
69-year-old female with a history of hypertension (HTN), peripheral vascular disease (PVD), chronic lymphedema, sleep apnea, asthma, lap-band surgery, and appendectomy. She presents with complaints of leg pain. The patient describes her LE pain as throbbing and "muscle pain," extending from her groin down to her feet. The pain worsens with sitting and walking. When walking, the pain radiates from her foot upwards to her groin. Typically, she manages her pain with meloxicam, ibuprofen, hot showers, and leg massages, which usually provide relief. However, she reports that her pain worsened over the weekend and became unbearable today, rated as "12/10," with no relief from her usual methods.   69-year-old female with a history of hypertension (HTN), peripheral vascular disease (PVD), chronic lymphedema, sleep apnea, asthma, lap-band surgery, and appendectomy. She presents with complaints of leg pain. The patient describes her LE pain as throbbing and "muscle pain," extending from her groin down to her feet. The pain worsens with sitting and walking. When walking, the pain radiates from her foot upwards to her groin. Typically, she manages her pain with meloxicam, ibuprofen, hot showers, and leg massages, which usually provide relief. However, she reports that her pain worsened over the weekend and became unbearable today, rated as "12/10," with no relief from her usual methods.     Visited pt at bed side this morning. Currently on DASH/TLC diet. Reports consuming 100% breakfast tray (blueberry muffin, cup of coffee). Reports wt gain s/p hysterectomy x 1 yr ago, stating current wt at 272#. Bed scale wt of 269# taken by RD on 2/25/25, w/ mild edema doc'd which may be skewing current wt appearance. No pertinent wt changes noted at this time. NFPE does not reveal any muscle/fat wasting at this time. C/w current PO diet. RD consulted for "Pressure injury stage 2 or >" however no PIs doc'd in chart (?). See recs below.

## 2025-02-25 NOTE — DIETITIAN INITIAL EVALUATION ADULT - NS FNS DIET ORDER
Diet, DASH/TLC:   Sodium & Cholesterol Restricted (02-25-25 @ 04:14)   Diet, DASH/TLC:   Sodium & Cholesterol Restricted (02-25-25 @ 04:14)

## 2025-02-25 NOTE — CONSULT NOTE ADULT - SUBJECTIVE AND OBJECTIVE BOX
Patient is a 69y old  Female who presents with a chief complaint of left leg pain    HPI:  69-year-old female with h/o hypertension (HTN), peripheral vascular disease (PVD), chronic lymphedema, sleep apnea, asthma, lap-band surgery, and appendectomy was admitted on 2/24 for worsening leg pain. The patient reports a history of vascular disease and chronic lower extremity pain. She has not followed up with a vascular doctor since her last doctor passed away. The patient describes her LE pain as throbbing and "muscle pain," extending from her groin down to her feet. The pain worsens with sitting and walking. When walking, the pain radiates from her foot upwards to her groin. Typically, she manages her pain with meloxicam, ibuprofen, hot showers, and leg massages, which usually provide relief. However, she reports that her pain worsened over the weekend and became unbearable today, rated as "12/10," with no relief from her usual methods. The pain is worse in her left leg compared to her right, reports pain even when touching her skin. The patient also reports a recent change in the smell of her urine, but denies fevers, chills, dysuria, frequency, diarrhea, cough, shortness of breath (SOB), chest pain, palpitations. In ER she received doxycycline.     PMH: as above  PSH: as above  Meds: per reconciliation sheet, noted below  MEDICATIONS  (STANDING):  heparin   Injectable 5000 Unit(s) SubCutaneous every 8 hours  influenza  Vaccine (HIGH DOSE) 0.5 milliLiter(s) IntraMuscular once  methocarbamol 500 milliGRAM(s) Oral three times a day  pregabalin 50 milliGRAM(s) Oral three times a day  sodium chloride 0.9%. 1000 milliLiter(s) (100 mL/Hr) IV Continuous <Continuous>    MEDICATIONS  (PRN):  acetaminophen     Tablet .. 650 milliGRAM(s) Oral every 6 hours PRN Temp greater or equal to 38C (100.4F), Mild Pain (1 - 3)  aluminum hydroxide/magnesium hydroxide/simethicone Suspension 30 milliLiter(s) Oral every 4 hours PRN Dyspepsia  melatonin 3 milliGRAM(s) Oral at bedtime PRN Insomnia  ondansetron Injectable 4 milliGRAM(s) IV Push every 8 hours PRN Nausea and/or Vomiting    Allergies    aspirin (Unknown)    Intolerances      Social: no smoking, no alcohol, no illegal drugs; no recent travel, no exposure to TB  FAMILY HISTORY:  FH: type 2 diabetes (Father, Sibling)    Family history of hypertension (Sibling, Mother)      no history of premature cardiovascular disease in first degree relatives    ROS: the patient denies fever, no chills, no HA, no seizures, no dizziness, no sore throat, no nasal congestion, no blurry vision, no CP, no palpitations, no SOB, no cough, no abdominal pain, no diarrhea, no N/V, no dysuria, has left leg pain, no claudication, no rash, no joint aches, no rectal pain or bleeding, no night sweats  All other systems reviewed and are negative    Vital Signs Last 24 Hrs  T(C): 36.5 (25 Feb 2025 07:21), Max: 36.6 (24 Feb 2025 22:15)  T(F): 97.7 (25 Feb 2025 07:21), Max: 97.9 (24 Feb 2025 22:15)  HR: 60 (25 Feb 2025 07:21) (60 - 108)  BP: 102/52 (25 Feb 2025 07:21) (102/52 - 114/55)  BP(mean): 73 (25 Feb 2025 04:33) (73 - 73)  RR: 18 (25 Feb 2025 07:21) (18 - 18)  SpO2: 96% (25 Feb 2025 07:21) (96% - 100%)    Parameters below as of 25 Feb 2025 07:21  Patient On (Oxygen Delivery Method): room air    PE:    Constitutional:  No acute distress  HEENT: NC/AT, EOMI, PERRLA, conjunctivae clear; ears and nose atraumatic; pharynx benign  Neck: supple; thyroid not palpable  Back: no tenderness  Respiratory: respiratory effort normal; clear to auscultation  Cardiovascular: S1S2 regular, no murmurs  Abdomen: soft, not tender, not distended, positive BS; no liver or spleen organomegaly  Genitourinary: no suprapubic tenderness  Lymphatic: no LN palpable  Musculoskeletal: no muscle tenderness, no joint swelling or tenderness  Extremities: no pedal edema  Left lower leg tenderness  Neurological/ Psychiatric: AxOx3, judgement and insight normal; moving all extremities  Skin: no rashes; no palpable lesions    Labs: all available labs reviewed                        11.4   9.89  )-----------( 258      ( 25 Feb 2025 08:32 )             37.1     02-25    143  |  112[H]  |  9   ----------------------------<  79  3.9   |  27  |  0.54    Ca    8.8      25 Feb 2025 08:32  Phos  4.2     02-25  Mg     2.3     02-25    TPro  6.8  /  Alb  3.2[L]  /  TBili  0.5  /  DBili  x   /  AST  6[L]  /  ALT  19  /  AlkPhos  105  02-25     LIVER FUNCTIONS - ( 25 Feb 2025 08:32 )  Alb: 3.2 g/dL / Pro: 6.8 gm/dL / ALK PHOS: 105 U/L / ALT: 19 U/L / AST: 6 U/L / GGT: x           Urinalysis (02-25 @ 04:40)  Urine Appearance: Clear  Protein, Urine: Trace mg/dL    Radiology: all available radiological tests reviewed    < from: US Duplex Venous Lower Ext Complete, Bilateral (02.25.25 @ 01:24) >  No evidence of deep venous thrombosis in either lower extremity.  Limited evaluation of the calf veins.  < end of copied text >    < from: CT Angio Abd Aorta w/run-off w/ IV Cont (02.25.25 @ 00:11) >  1.   No pneumoperitoneum or abscess. Mild low anterior abdominal wallcellulitis. No soft tissue gas or abscess.  2.   Two-vessel runoff to each foot as above.  3.   Diffuse subcutaneous edema both lower extremities, ankles, and dorsalfeet. No soft tissue gas or abscess.  4.   No evidence of DVT.  < end of copied text >    Advanced directives addressed: full resuscitation Patient is a 69y old  Female who presents with a chief complaint of left leg pain    HPI:  69-year-old female with h/o hypertension (HTN), peripheral vascular disease (PVD), chronic lymphedema, sleep apnea, asthma, lap-band surgery, and appendectomy was admitted on 2/24 for worsening leg pain. The patient reports a history of vascular disease and chronic lower extremity pain. She has not followed up with a vascular doctor since her last doctor passed away. The patient describes her LE pain as throbbing and "muscle pain," extending from her groin down to her feet. The pain worsens with sitting and walking. When walking, the pain radiates from her foot upwards to her groin. Typically, she manages her pain with meloxicam, ibuprofen, hot showers, and leg massages, which usually provide relief. However, she reports that her pain worsened over the weekend and became unbearable today, rated as "12/10," with no relief from her usual methods. The pain is worse in her left leg compared to her right, reports pain even when touching her skin. The patient also reports a recent change in the smell of her urine, but denies fevers, chills, dysuria, frequency, diarrhea, cough, shortness of breath (SOB), chest pain, palpitations. In ER she received doxycycline.     PMH: as above  PSH: as above  Meds: per reconciliation sheet, noted below  MEDICATIONS  (STANDING):  heparin   Injectable 5000 Unit(s) SubCutaneous every 8 hours  influenza  Vaccine (HIGH DOSE) 0.5 milliLiter(s) IntraMuscular once  methocarbamol 500 milliGRAM(s) Oral three times a day  pregabalin 50 milliGRAM(s) Oral three times a day  sodium chloride 0.9%. 1000 milliLiter(s) (100 mL/Hr) IV Continuous <Continuous>    MEDICATIONS  (PRN):  acetaminophen     Tablet .. 650 milliGRAM(s) Oral every 6 hours PRN Temp greater or equal to 38C (100.4F), Mild Pain (1 - 3)  aluminum hydroxide/magnesium hydroxide/simethicone Suspension 30 milliLiter(s) Oral every 4 hours PRN Dyspepsia  melatonin 3 milliGRAM(s) Oral at bedtime PRN Insomnia  ondansetron Injectable 4 milliGRAM(s) IV Push every 8 hours PRN Nausea and/or Vomiting    Allergies    aspirin (Unknown)    Intolerances      Social: no smoking, no alcohol, no illegal drugs; no recent travel, no exposure to TB  FAMILY HISTORY:  FH: type 2 diabetes (Father, Sibling)    Family history of hypertension (Sibling, Mother)      no history of premature cardiovascular disease in first degree relatives    ROS: the patient denies fever, no chills, no HA, no seizures, no dizziness, no sore throat, no nasal congestion, no blurry vision, no CP, no palpitations, no SOB, no cough, no abdominal pain, no diarrhea, no N/V, no dysuria, has left leg pain, no claudication, no rash, no joint aches, no rectal pain or bleeding, no night sweats  All other systems reviewed and are negative    Vital Signs Last 24 Hrs  T(C): 36.5 (25 Feb 2025 07:21), Max: 36.6 (24 Feb 2025 22:15)  T(F): 97.7 (25 Feb 2025 07:21), Max: 97.9 (24 Feb 2025 22:15)  HR: 60 (25 Feb 2025 07:21) (60 - 108)  BP: 102/52 (25 Feb 2025 07:21) (102/52 - 114/55)  BP(mean): 73 (25 Feb 2025 04:33) (73 - 73)  RR: 18 (25 Feb 2025 07:21) (18 - 18)  SpO2: 96% (25 Feb 2025 07:21) (96% - 100%)    Parameters below as of 25 Feb 2025 07:21  Patient On (Oxygen Delivery Method): room air    PE:    Constitutional:  No acute distress  HEENT: NC/AT, EOMI, PERRLA, conjunctivae clear; ears and nose atraumatic; pharynx benign  Neck: supple; thyroid not palpable  Back: no tenderness  Respiratory: respiratory effort normal; clear to auscultation  Cardiovascular: S1S2 regular, no murmurs  Abdomen: soft, not tender, not distended, positive BS; no liver or spleen organomegaly  Genitourinary: no suprapubic tenderness  Lymphatic: no LN palpable  Musculoskeletal: no muscle tenderness, no joint swelling or tenderness  Extremities: 1-2+ pedal edema  Left lower leg tenderness > right lower leg  b/l LE dark skin discoloration; no heat, no skin break  Neurological/ Psychiatric: AxOx3, judgement and insight normal; moving all extremities  Skin: no rashes; no palpable lesions    Labs: all available labs reviewed                        11.4   9.89  )-----------( 258      ( 25 Feb 2025 08:32 )             37.1     02-25    143  |  112[H]  |  9   ----------------------------<  79  3.9   |  27  |  0.54    Ca    8.8      25 Feb 2025 08:32  Phos  4.2     02-25  Mg     2.3     02-25    TPro  6.8  /  Alb  3.2[L]  /  TBili  0.5  /  DBili  x   /  AST  6[L]  /  ALT  19  /  AlkPhos  105  02-25     LIVER FUNCTIONS - ( 25 Feb 2025 08:32 )  Alb: 3.2 g/dL / Pro: 6.8 gm/dL / ALK PHOS: 105 U/L / ALT: 19 U/L / AST: 6 U/L / GGT: x           Urinalysis (02-25 @ 04:40)  Urine Appearance: Clear  Protein, Urine: Trace mg/dL    Radiology: all available radiological tests reviewed    < from: US Duplex Venous Lower Ext Complete, Bilateral (02.25.25 @ 01:24) >  No evidence of deep venous thrombosis in either lower extremity.  Limited evaluation of the calf veins.  < end of copied text >    < from: CT Angio Abd Aorta w/run-off w/ IV Cont (02.25.25 @ 00:11) >  1.   No pneumoperitoneum or abscess. Mild low anterior abdominal wallcellulitis. No soft tissue gas or abscess.  2.   Two-vessel runoff to each foot as above.  3.   Diffuse subcutaneous edema both lower extremities, ankles, and dorsalfeet. No soft tissue gas or abscess.  4.   No evidence of DVT.  < end of copied text >    Advanced directives addressed: full resuscitation

## 2025-02-25 NOTE — H&P ADULT - NSHPLABSRESULTS_GEN_ALL_CORE
LABS:  cret                        12.6   11.13 )-----------( 292      ( 24 Feb 2025 23:46 )             39.6     02-24    142  |  110[H]  |  11  ----------------------------<  112[H]  4.3   |  29  |  0.66    Ca    9.3      24 Feb 2025 23:46    TPro  7.5  /  Alb  3.5  /  TBili  0.3  /  DBili  x   /  AST  12[L]  /  ALT  18  /  AlkPhos  113  02-24    PT/INR - ( 24 Feb 2025 23:46 )   PT: 11.1 sec;   INR: 0.94 ratio       PTT - ( 24 Feb 2025 23:46 )  PTT:34.1 sec    < from: US Duplex Venous Lower Ext Complete, Bilateral (02.25.25 @ 01:24) >    IMPRESSION:  No evidence of deep venous thrombosis in either lower extremity.    Limited evaluation of the calf veins.    --- End of Report ---    < end of copied text >    < from: CT Angio Abd Aorta w/run-off w/ IV Cont (02.25.25 @ 00:11) >    FINDINGS:  Tubes, catheters and devices: Gastric banding device present.    Aorta: No aortic aneurysm. No aortic dissection.  Celiac trunk and mesenteric arteries: No occlusion or significant   stenosis.  Renalarteries: No occlusion or significant stenosis.  Right iliac arteries: No occlusion or significant stenosis.  Right femoral/popliteal arteries: No occlusion or significant stenosis.  Right infrapopliteal arteries: The right anterior tibial artery is patent   and  can be traced as a patent dorsalis pedis artery well into the foot. The   right  peroneal artery is patent and continues as patent distal posterior tibial  artery which can be traced as patent medial and lateral plantar branches   well  into the foot.  Left iliac arteries: No occlusion or significant stenosis.  Left femoral/popliteal arteries: No occlusion or significant stenosis.  Left infrapopliteal arteries: The left posterior tibial artery is   occluded but  is reconstituted at the ankle by the peroneal artery and can be traced as  patent medial and lateral plantar branches well into the foot. The left  anterior tibial artery is patent and can be traced as a patent dorsalis   pedis  artery well into the foot. The left peroneal artery is patent and   continues as  the patent distal posterior tibial artery at the ankle which can be   traced as  patent medial and lateral plantar branches into the foot. The left   posterior  tibial artery appears to be either occluded or extremely diminutive but is  joined by the peroneal artery at the ankle to continuous patent medial and  lateral plantar branches into the foot.    < end of copied text >    < from: CT Angio Abd Aorta w/run-off w/ IV Cont (02.25.25 @ 00:11) >    IMPRESSION:  1.   No pneumoperitoneum or abscess. Mild low anterior abdominal wall  cellulitis. No soft tissue gas or abscess.  2.   Two-vessel runoff to each foot as above.  3.   Diffuse subcutaneous edema both lower extremities, ankles, and dorsal  feet. No soft tissue gas or abscess.  4.   No evidence of DVT.    < end of copied text > Universal Safety Interventions

## 2025-02-25 NOTE — PATIENT PROFILE ADULT - FUNCTIONAL ASSESSMENT - BASIC MOBILITY 6.
personal 
 3-calculated by average/Not able to assess (calculate score using [facility] averaging method)

## 2025-02-25 NOTE — ED ADULT NURSE REASSESSMENT NOTE - NS ED NURSE REASSESS COMMENT FT1
Pt received from [facility] at 0440. Pt in NAD, VSS, A+OX4; resting in ED stretcher. Pt denies sob, N/V, cp. Endorses bl leg pain. Medicated as per MAR. IVF infusing as per orders. Pt profile completed. Awaiting bed assignment. Pt profile completed. Fall and safety precautions maintained. All questions answered. UA/UC sent to lab. Care continues as ordered.

## 2025-02-25 NOTE — DIETITIAN INITIAL EVALUATION ADULT - PERTINENT LABORATORY DATA
02-25    143  |  112[H]  |  9   ----------------------------<  79  3.9   |  27  |  0.54    Ca    8.8      25 Feb 2025 08:32  Phos  4.2     02-25  Mg     2.3     02-25    TPro  6.8  /  Alb  3.2[L]  /  TBili  0.5  /  DBili  x   /  AST  6[L]  /  ALT  19  /  AlkPhos  105  02-25   02-25    143  |  112[H]  |  9   ----------------------------<  79  3.9   |  27  |  0.54    Ca    8.8      25 Feb 2025 08:32  Phos  4.2     02-25  Mg     2.3     02-25    TPro  6.8  /  Alb  3.2[L]  /  TBili  0.5  /  DBili  x   /  AST  6[L]  /  ALT  19  /  AlkPhos  105  02-25

## 2025-02-25 NOTE — DIETITIAN INITIAL EVALUATION ADULT - PERTINENT MEDS FT
MEDICATIONS  (STANDING):  heparin   Injectable 5000 Unit(s) SubCutaneous every 8 hours  influenza  Vaccine (HIGH DOSE) 0.5 milliLiter(s) IntraMuscular once  methocarbamol 500 milliGRAM(s) Oral three times a day  pregabalin 50 milliGRAM(s) Oral three times a day  sodium chloride 0.9%. 1000 milliLiter(s) (100 mL/Hr) IV Continuous <Continuous>    MEDICATIONS  (PRN):  acetaminophen     Tablet .. 650 milliGRAM(s) Oral every 6 hours PRN Temp greater or equal to 38C (100.4F), Mild Pain (1 - 3)  aluminum hydroxide/magnesium hydroxide/simethicone Suspension 30 milliLiter(s) Oral every 4 hours PRN Dyspepsia  melatonin 3 milliGRAM(s) Oral at bedtime PRN Insomnia  ondansetron Injectable 4 milliGRAM(s) IV Push every 8 hours PRN Nausea and/or Vomiting   MEDICATIONS  (STANDING):  heparin   Injectable 5000 Unit(s) SubCutaneous every 8 hours  influenza  Vaccine (HIGH DOSE) 0.5 milliLiter(s) IntraMuscular once  methocarbamol 500 milliGRAM(s) Oral three times a day  pregabalin 50 milliGRAM(s) Oral three times a day  sodium chloride 0.9%. 1000 milliLiter(s) (100 mL/Hr) IV Continuous <Continuous>    MEDICATIONS  (PRN):  acetaminophen     Tablet .. 650 milliGRAM(s) Oral every 6 hours PRN Temp greater or equal to 38C (100.4F), Mild Pain (1 - 3)  aluminum hydroxide/magnesium hydroxide/simethicone Suspension 30 milliLiter(s) Oral every 4 hours PRN Dyspepsia  melatonin 3 milliGRAM(s) Oral at bedtime PRN Insomnia  ondansetron Injectable 4 milliGRAM(s) IV Push every 8 hours PRN Nausea and/or Vomiting

## 2025-02-25 NOTE — ED ADULT NURSE NOTE - NSFALLRISKINTERV_ED_ALL_ED

## 2025-02-25 NOTE — DIETITIAN INITIAL EVALUATION ADULT - ADD RECOMMEND
1) C/w current PO diet  2) Monitor bowel movements, if no BM for >3 days, consider implementing bowel regimen.   3) MVI w/ minerals daily to ensure 100% RDA met   4) Obtain weekly wt to track/trend changes  RD will continue to monitor PO intake, labs, hydration, and wt prn.

## 2025-02-26 LAB
ANION GAP SERPL CALC-SCNC: 2 MMOL/L — LOW (ref 5–17)
BUN SERPL-MCNC: 11 MG/DL — SIGNIFICANT CHANGE UP (ref 7–23)
CALCIUM SERPL-MCNC: 9.2 MG/DL — SIGNIFICANT CHANGE UP (ref 8.5–10.1)
CHLORIDE SERPL-SCNC: 110 MMOL/L — HIGH (ref 96–108)
CO2 SERPL-SCNC: 30 MMOL/L — SIGNIFICANT CHANGE UP (ref 22–31)
CREAT SERPL-MCNC: 0.51 MG/DL — SIGNIFICANT CHANGE UP (ref 0.5–1.3)
EGFR: 100 ML/MIN/1.73M2 — SIGNIFICANT CHANGE UP
GLUCOSE SERPL-MCNC: 91 MG/DL — SIGNIFICANT CHANGE UP (ref 70–99)
POTASSIUM SERPL-MCNC: 4.5 MMOL/L — SIGNIFICANT CHANGE UP (ref 3.5–5.3)
POTASSIUM SERPL-SCNC: 4.5 MMOL/L — SIGNIFICANT CHANGE UP (ref 3.5–5.3)
SODIUM SERPL-SCNC: 142 MMOL/L — SIGNIFICANT CHANGE UP (ref 135–145)

## 2025-02-26 PROCEDURE — 99233 SBSQ HOSP IP/OBS HIGH 50: CPT

## 2025-02-26 RX ORDER — CEFTRIAXONE 500 MG/1
1000 INJECTION, POWDER, FOR SOLUTION INTRAMUSCULAR; INTRAVENOUS EVERY 24 HOURS
Refills: 0 | Status: DISCONTINUED | OUTPATIENT
Start: 2025-02-26 | End: 2025-02-28

## 2025-02-26 RX ORDER — FUROSEMIDE 10 MG/ML
20 INJECTION INTRAMUSCULAR; INTRAVENOUS ONCE
Refills: 0 | Status: COMPLETED | OUTPATIENT
Start: 2025-02-26 | End: 2025-02-26

## 2025-02-26 RX ADMIN — FUROSEMIDE 20 MILLIGRAM(S): 10 INJECTION INTRAMUSCULAR; INTRAVENOUS at 17:39

## 2025-02-26 RX ADMIN — HEPARIN SODIUM 5000 UNIT(S): 1000 INJECTION INTRAVENOUS; SUBCUTANEOUS at 05:51

## 2025-02-26 RX ADMIN — HEPARIN SODIUM 5000 UNIT(S): 1000 INJECTION INTRAVENOUS; SUBCUTANEOUS at 21:38

## 2025-02-26 RX ADMIN — PREGABALIN 50 MILLIGRAM(S): 50 CAPSULE ORAL at 14:08

## 2025-02-26 RX ADMIN — CEFTRIAXONE 1000 MILLIGRAM(S): 500 INJECTION, POWDER, FOR SOLUTION INTRAMUSCULAR; INTRAVENOUS at 17:39

## 2025-02-26 RX ADMIN — METHOCARBAMOL 500 MILLIGRAM(S): 500 TABLET, FILM COATED ORAL at 21:38

## 2025-02-26 RX ADMIN — METHOCARBAMOL 500 MILLIGRAM(S): 500 TABLET, FILM COATED ORAL at 14:11

## 2025-02-26 RX ADMIN — PREGABALIN 50 MILLIGRAM(S): 50 CAPSULE ORAL at 05:51

## 2025-02-26 RX ADMIN — METHOCARBAMOL 500 MILLIGRAM(S): 500 TABLET, FILM COATED ORAL at 05:51

## 2025-02-26 RX ADMIN — HEPARIN SODIUM 5000 UNIT(S): 1000 INJECTION INTRAVENOUS; SUBCUTANEOUS at 14:11

## 2025-02-26 RX ADMIN — PREGABALIN 50 MILLIGRAM(S): 50 CAPSULE ORAL at 21:38

## 2025-02-26 RX ADMIN — Medication 650 MILLIGRAM(S): at 17:45

## 2025-02-26 NOTE — PROGRESS NOTE ADULT - SUBJECTIVE AND OBJECTIVE BOX
HOSPITALIST ATTENDING PROGRESS NOTE    70 y/o female with a PMHx of hypertension, peripheral vascular disease, chronic lymphedema, sleep apnea, asthma, lap-band surgery, appendectomy, hysterectomy, umbilical hernia repair, and vein symptom right leg laser vein cleanse and bypass (2008). She presents with complaints of leg pain. The patient reports a history of vascular disease and chronic lower extremity pain. She has not followed up with a vascular doctor since her last doctor passed away. The patient describes her LE pain as throbbing and "muscle pain," extending from her groin down to her feet. The pain worsens with sitting and walking. When walking, the pain radiates from her foot upwards to her groin. Typically, she manages her pain with meloxicam, ibuprofen, hot showers, and leg massages, which usually provide relief. However, she reports that her pain worsened over the weekend and became unbearable today, rated as "12/10," with no relief from her usual methods. The pain is worse in her left leg compared to her right, reports pain even when touching her skin. The patient also reports a recent change in the smell of her urine but denies fevers, chills, dysuria, frequency, diarrhea, cough, shortness of breath (SOB), chest pain, palpitations, or other acute complaints.    Chart and meds reviewed. Patient seen and examined     CC: Bilateral Lower extremity pain     Interval Hx/Events: Patient examined in bed, continues to have severe "12/10" pain in both lower extremities, left > right. Endorses mild suprapubic pain. States that hands are swollen and endorses a tingling sensation. No other complaints at this time.       All other systems and founds to be negative with exception of what has been described above.         PHYSICAL EXAM:  Vital Signs Last 24 Hrs  T(C): 36.9 (26 Feb 2025 07:58), Max: 37.1 (25 Feb 2025 21:28)  T(F): 98.5 (26 Feb 2025 07:58), Max: 98.8 (25 Feb 2025 21:28)  HR: 75 (26 Feb 2025 07:58) (73 - 84)  BP: 133/69 (26 Feb 2025 07:58) (114/56 - 133/69)  BP(mean): 84 (26 Feb 2025 07:58) (84 - 84)  RR: 16 (26 Feb 2025 07:58) (16 - 18)  SpO2: 95% (26 Feb 2025 07:58) (95% - 95%)    Parameters below as of 26 Feb 2025 07:58  Patient On (Oxygen Delivery Method): room air      Daily     Daily     GEN: NAD   HEENT: EOMI,  moist mucous membranes  NECK : Soft and supple, no JVD  LUNG: CTABL, No wheezing, rales or rhonchi  CVS: S1S2+, RRR, no M/G/R  GI: BS+, soft, NT/ND, no guarding, no rebound  EXTREMITIES: b/l LE stasis dermatitis, lichenified skin in LE, + b/l LE edema, + tenderness to palpation of b/l LE  VASCULAR: 2+ peripheral pulses  NEURO: AAOx3, grossly non-focal   SKIN: No rashes,  b/l LE stasis dermatitis, lichenified skin in LE  : + suprapubic tenderness, + Morse catheter in place     HOME MEDICATIONS:  Home Medications:  Dyazide 25 mg-37.5 mg oral capsule: 1 cap(s) orally once a day (25 Feb 2025 06:40)  meloxicam 15 mg oral tablet: 1 tab(s) orally once a day, As Needed for pain (25 Feb 2025 06:40)      MEDICATIONS  MEDICATIONS  (STANDING):  heparin   Injectable 5000 Unit(s) SubCutaneous every 8 hours  influenza  Vaccine (HIGH DOSE) 0.5 milliLiter(s) IntraMuscular once  methocarbamol 500 milliGRAM(s) Oral three times a day  pregabalin 50 milliGRAM(s) Oral three times a day  sodium chloride 0.9%. 1000 milliLiter(s) (100 mL/Hr) IV Continuous <Continuous>    MEDICATIONS  (PRN):  acetaminophen     Tablet .. 650 milliGRAM(s) Oral every 6 hours PRN Temp greater or equal to 38C (100.4F), Mild Pain (1 - 3)  aluminum hydroxide/magnesium hydroxide/simethicone Suspension 30 milliLiter(s) Oral every 4 hours PRN Dyspepsia  melatonin 3 milliGRAM(s) Oral at bedtime PRN Insomnia  ondansetron Injectable 4 milliGRAM(s) IV Push every 8 hours PRN Nausea and/or Vomiting    LABS: All Labs Reviewed:                        11.4   9.89  )-----------( 258      ( 25 Feb 2025 08:32 )             37.1     02-26    142  |  110[H]  |  11  ----------------------------<  91  4.5   |  30  |  0.51    Ca    9.2      26 Feb 2025 07:49  Phos  4.2     02-25  Mg     2.3     02-25    TPro  6.8  /  Alb  3.2[L]  /  TBili  0.5  /  DBili  x   /  AST  6[L]  /  ALT  19  /  AlkPhos  105  02-25    PT/INR - ( 24 Feb 2025 23:46 )   PT: 11.1 sec;   INR: 0.94 ratio         PTT - ( 24 Feb 2025 23:46 )  PTT:34.1 sec    Urinalysis Basic - ( 26 Feb 2025 07:49 )    Color: x / Appearance: x / SG: x / pH: x  Gluc: 91 mg/dL / Ketone: x  / Bili: x / Urobili: x   Blood: x / Protein: x / Nitrite: x   Leuk Esterase: x / RBC: x / WBC x   Sq Epi: x / Non Sq Epi: x / Bacteria: x        Culture - Urine (collected 25 Feb 2025 04:40)  Source: Catheterized Catheterized  Preliminary Report (26 Feb 2025 09:22):    50,000 - 99,000 CFU/mL Gram Negative Rods    Culture - Urine (02.25.25 @ 04:40)   Specimen Source: Catheterized Catheterized  Culture Results:   50,000 - 99,000 CFU/mL Gram Negative Rods    I&O's Detail    25 Feb 2025 07:01  -  26 Feb 2025 07:00  --------------------------------------------------------  IN:  Total IN: 0 mL    OUT:    Voided (mL): 1200 mL  Total OUT: 1200 mL    Total NET: -1200 mL        CAPILLARY BLOOD GLUCOSE        CARDIOLOGY TESTING         EKG: reviewed     ECHO       RADIOLOGY  < from: US Duplex Venous Lower Ext Complete, Bilateral (02.25.25 @ 01:24) >    IMPRESSION:  No evidence of deep venous thrombosis in either lower extremity.    Limited evaluation of the calf veins.    --- End of Report ---      < from: CT Angio Abd Aorta w/run-off w/ IV Cont (02.25.25 @ 00:11) >    FINDINGS:  Tubes, catheters and devices: Gastric banding device present.    Aorta: No aortic aneurysm. No aortic dissection.  Celiac trunk and mesenteric arteries: No occlusion or significant   stenosis.  Renalarteries: No occlusion or significant stenosis.  Right iliac arteries: No occlusion or significant stenosis.  Right femoral/popliteal arteries: No occlusion or significant stenosis.  Right infrapopliteal arteries: The right anterior tibial artery is patent   and can be traced as a patent dorsalis pedis artery well into the foot. The   right peroneal artery is patent and continues as patent distal posterior tibial  artery which can be traced as patent medial and lateral plantar branches well into the foot.  Left iliac arteries: No occlusion or significant stenosis.  Left femoral/popliteal arteries: No occlusion or significant stenosis.  Left infrapopliteal arteries: The left posterior tibial artery is occluded but  is reconstituted at the ankle by the peroneal artery and can be traced as  patent medial and lateral plantar branches well into the foot. The left  anterior tibial artery is patent and can be traced as a patent dorsalis   pedis artery well into the foot. The left peroneal artery is patent and   continues as the patent distal posterior tibial artery at the ankle which can be   traced as patent medial and lateral plantar branches into the foot. The left   posterior tibial artery appears to be either occluded or extremely diminutive but is  joined by the peroneal artery at the ankle to continuous patent medial and  lateral plantar branches into the foot.    Veins: No evidence of DVT. Varicose veins, adjacent soft tissue swelling   and calcifications above the ankles, right more than left.    Liver: No mass.  Gallbladder and biliary ducts: Prior cholecystectomy. No biliary ductal  dilatation.  Pancreas: Unremarkable. No mass. No ductal dilation.  Spleen: Normal. No splenomegaly.  Adrenal glands: Normal. No mass.  Kidneys and ureters: Normal. No mass.  Stomach and bowel: No bowel wall thickening or intestinal obstruction. No  pneumatosis or portal/mesenteric venous gas.  Appendix: No evidence of appendicitis.  Urinary bladder: Unremarkable. No mass.  Reproductive: Hysterectomy.  Intraperitoneal space: No pneumoperitoneum or abscess. Mild low anterior  abdominal wall cellulitis. No soft tissue gas or abscess.  Lymph nodes: No lymphadenopathy.  Bones/joints: Bilateral knee osteoarthritis.  Soft tissues: Diffuse subcutaneous edema both lower extremities, ankles,   and  dorsal feet. No soft tissue gas or abscess.  IMPRESSION:  1.   No pneumoperitoneum or abscess. Mild low anterior abdominal wall  cellulitis. No soft tissue gas or abscess.  2.   Two-vessel runoff to each foot as above.  3.   Diffuse subcutaneous edema both lower extremities, ankles, and dorsal  feet. No soft tissue gas or abscess.  4.   No evidence of DVT.    < end of copied text >

## 2025-02-26 NOTE — PROGRESS NOTE ADULT - ATTENDING COMMENTS
#Lower extremity pain, likely secondary to PAD  -Vascular eval pending  -PT eval    #Suprapubic tenderness  -UA: Noted  -Cultures: Noted  -Will treat as patient is symptomatic

## 2025-02-27 LAB
-  AMOXICILLIN/CLAVULANIC ACID: SIGNIFICANT CHANGE UP
-  AMPICILLIN/SULBACTAM: SIGNIFICANT CHANGE UP
-  AMPICILLIN: SIGNIFICANT CHANGE UP
-  AZTREONAM: SIGNIFICANT CHANGE UP
-  CEFAZOLIN: SIGNIFICANT CHANGE UP
-  CEFEPIME: SIGNIFICANT CHANGE UP
-  CEFOXITIN: SIGNIFICANT CHANGE UP
-  CEFTRIAXONE: SIGNIFICANT CHANGE UP
-  CEFUROXIME: SIGNIFICANT CHANGE UP
-  CIPROFLOXACIN: SIGNIFICANT CHANGE UP
-  ERTAPENEM: SIGNIFICANT CHANGE UP
-  GENTAMICIN: SIGNIFICANT CHANGE UP
-  LEVOFLOXACIN: SIGNIFICANT CHANGE UP
-  MEROPENEM: SIGNIFICANT CHANGE UP
-  NITROFURANTOIN: SIGNIFICANT CHANGE UP
-  PIPERACILLIN/TAZOBACTAM: SIGNIFICANT CHANGE UP
-  TOBRAMYCIN: SIGNIFICANT CHANGE UP
-  TRIMETHOPRIM/SULFAMETHOXAZOLE: SIGNIFICANT CHANGE UP
CULTURE RESULTS: ABNORMAL
METHOD TYPE: SIGNIFICANT CHANGE UP
ORGANISM # SPEC MICROSCOPIC CNT: ABNORMAL
ORGANISM # SPEC MICROSCOPIC CNT: SIGNIFICANT CHANGE UP
SPECIMEN SOURCE: SIGNIFICANT CHANGE UP

## 2025-02-27 PROCEDURE — 99233 SBSQ HOSP IP/OBS HIGH 50: CPT

## 2025-02-27 RX ADMIN — HEPARIN SODIUM 5000 UNIT(S): 1000 INJECTION INTRAVENOUS; SUBCUTANEOUS at 21:17

## 2025-02-27 RX ADMIN — METHOCARBAMOL 500 MILLIGRAM(S): 500 TABLET, FILM COATED ORAL at 06:03

## 2025-02-27 RX ADMIN — PREGABALIN 50 MILLIGRAM(S): 50 CAPSULE ORAL at 06:05

## 2025-02-27 RX ADMIN — HEPARIN SODIUM 5000 UNIT(S): 1000 INJECTION INTRAVENOUS; SUBCUTANEOUS at 13:59

## 2025-02-27 RX ADMIN — PREGABALIN 50 MILLIGRAM(S): 50 CAPSULE ORAL at 21:13

## 2025-02-27 RX ADMIN — HEPARIN SODIUM 5000 UNIT(S): 1000 INJECTION INTRAVENOUS; SUBCUTANEOUS at 06:03

## 2025-02-27 RX ADMIN — METHOCARBAMOL 500 MILLIGRAM(S): 500 TABLET, FILM COATED ORAL at 21:21

## 2025-02-27 RX ADMIN — CEFTRIAXONE 1000 MILLIGRAM(S): 500 INJECTION, POWDER, FOR SOLUTION INTRAMUSCULAR; INTRAVENOUS at 17:25

## 2025-02-27 RX ADMIN — METHOCARBAMOL 500 MILLIGRAM(S): 500 TABLET, FILM COATED ORAL at 13:59

## 2025-02-27 RX ADMIN — PREGABALIN 50 MILLIGRAM(S): 50 CAPSULE ORAL at 13:59

## 2025-02-27 RX ADMIN — Medication 3 MILLIGRAM(S): at 21:13

## 2025-02-27 NOTE — PHYSICAL THERAPY INITIAL EVALUATION ADULT - LEVEL OF INDEPENDENCE: GAIT, REHAB EVAL
Progress Note    S/P  CABG x 4 with LAD and Diag endarterectomy on 3/7/19    Subjective:    Doing great. No complaints today. He was in the hospital overnight a couple of weeks ago after he went to the ED with a brief episode of chest pain that had resolved by the time he got to the ED. Given his recent surgery, the ED admitted him despite negative work up. He was discharged the next morning. No issues since. Vital Signs:                                                 /78 (Site: Left Upper Arm, Position: Sitting)   Pulse 67   Temp 99 °F (37.2 °C) (Oral)   Ht 6' 1\" (1.854 m)   Wt 241 lb (109.3 kg)   SpO2 99%   BMI 31.80 kg/m²        CV:   Normal rate, regular  Pulm: normal effort, clear  Incisions:    Sternotomy site well healed, sternum stable, radial artery harvest site healing well, saphenectomy sit healing well      Assessment/Plan:  Mr. Sally Flor is doing well after CABG. He returns for his routine second post-operative visit. He should continue his sternal precautions for a total of 12 weeks following his operation, after 8 weeks-so another week for him, he can increase his lifting restriction to 25 lbs. His surgical sites are all healing well. He is following up with Dr. Viral La. He does not need to follow up with our office and can return as needed if any new issues arise. Dora Solomon MD  5/1/2019  11:45 AM     I saw and evaluated the patient. I agree with the findings/plan of care in the fellow's note.       Charls Boeck, MD  5/1/2019  2:56 PM
transfer/gait training/bed mob 15'/contact guard

## 2025-02-27 NOTE — PROGRESS NOTE ADULT - SUBJECTIVE AND OBJECTIVE BOX
HOSPITALIST ATTENDING PROGRESS NOTE    70 y/o female with a PMHx of hypertension, peripheral vascular disease, chronic lymphedema, sleep apnea, asthma, lap-band surgery, appendectomy, hysterectomy, umbilical hernia repair, and vein symptom right leg laser vein cleanse and bypass (2008). She presents with complaints of leg pain. The patient reports a history of vascular disease and chronic lower extremity pain. She has not followed up with a vascular doctor since her last doctor passed away. The patient describes her LE pain as throbbing and "muscle pain," extending from her groin down to her feet. The pain worsens with sitting and walking. When walking, the pain radiates from her foot upwards to her groin. Typically, she manages her pain with meloxicam, ibuprofen, hot showers, and leg massages, which usually provide relief. However, she reports that her pain worsened over the weekend and became unbearable today, rated as "12/10," with no relief from her usual methods. The pain is worse in her left leg compared to her right, reports pain even when touching her skin. The patient also reports a recent change in the smell of her urine but denies fevers, chills, dysuria, frequency, diarrhea, cough, shortness of breath (SOB), chest pain, palpitations, or other acute complaints.    Chart and meds reviewed. Patient seen and examined     CC: Bilateral Lower extremity pain     Interval Hx/Events:   2/26/25: Patient examined in bed, continues to have severe "12/10" pain in both lower extremities, left > right. Endorses mild suprapubic pain. States that hands are swollen and endorses a tingling sensation. No other complaints at this time.   2/27/25: Patient examined in bed, states pain has decreased and is now 1-2/10 in both lower extremities, left > right. No other complaints at this time.     All other systems and founds to be negative with exception of what has been described above.         PHYSICAL EXAM:  Vital Signs Last 24 Hrs  T(C): 36.6 (27 Feb 2025 07:02), Max: 36.9 (26 Feb 2025 16:04)  T(F): 97.8 (27 Feb 2025 07:02), Max: 98.5 (26 Feb 2025 16:04)  HR: 77 (27 Feb 2025 07:02) (77 - 88)  BP: 137/73 (27 Feb 2025 07:02) (111/48 - 137/73)  BP(mean): --  RR: 18 (27 Feb 2025 07:02) (17 - 18)  SpO2: 98% (27 Feb 2025 07:02) (93% - 98%)    Parameters below as of 27 Feb 2025 07:02  Patient On (Oxygen Delivery Method): room air      Daily     Daily     GEN: NAD   HEENT: EOMI,  moist mucous membranes  NECK : Soft and supple, no JVD  LUNG: CTABL, No wheezing, rales or rhonchi  CVS: S1S2+, RRR, no M/G/R  GI: BS+, soft, NT/ND, no guarding, no rebound  EXTREMITIES: b/l LE stasis dermatitis, lichenified skin in LE, + b/l LE edema, + tenderness to palpation of b/l LE  VASCULAR: 2+ peripheral pulses  NEURO: AAOx3, grossly non-focal   SKIN: No rashes,  b/l LE stasis dermatitis, lichenified skin in LE  : + suprapubic tenderness, + Morse catheter in place     HOME MEDICATIONS:  Home Medications:  Dyazide 25 mg-37.5 mg oral capsule: 1 cap(s) orally once a day (25 Feb 2025 06:40)  meloxicam 15 mg oral tablet: 1 tab(s) orally once a day, As Needed for pain (25 Feb 2025 06:40)    MEDICATIONS  (STANDING):  cefTRIAXone Injectable. 1000 milliGRAM(s) IV Push every 24 hours  heparin   Injectable 5000 Unit(s) SubCutaneous every 8 hours  influenza  Vaccine (HIGH DOSE) 0.5 milliLiter(s) IntraMuscular once  methocarbamol 500 milliGRAM(s) Oral three times a day  pregabalin 50 milliGRAM(s) Oral three times a day  sodium chloride 0.9%. 1000 milliLiter(s) (100 mL/Hr) IV Continuous <Continuous>    MEDICATIONS  (PRN):  acetaminophen     Tablet .. 650 milliGRAM(s) Oral every 6 hours PRN Temp greater or equal to 38C (100.4F), Mild Pain (1 - 3)  aluminum hydroxide/magnesium hydroxide/simethicone Suspension 30 milliLiter(s) Oral every 4 hours PRN Dyspepsia  melatonin 3 milliGRAM(s) Oral at bedtime PRN Insomnia  ondansetron Injectable 4 milliGRAM(s) IV Push every 8 hours PRN Nausea and/or Vomiting      LABS: All Labs Reviewed:                        11.4   9.89  )-----------( 258      ( 25 Feb 2025 08:32 )             37.1     02-26    142  |  110[H]  |  11  ----------------------------<  91  4.5   |  30  |  0.51    Ca    9.2      26 Feb 2025 07:49  Phos  4.2     02-25  Mg     2.3     02-25    TPro  6.8  /  Alb  3.2[L]  /  TBili  0.5  /  DBili  x   /  AST  6[L]  /  ALT  19  /  AlkPhos  105  02-25    PT/INR - ( 24 Feb 2025 23:46 )   PT: 11.1 sec;   INR: 0.94 ratio         PTT - ( 24 Feb 2025 23:46 )  PTT:34.1 sec    Urinalysis Basic - ( 26 Feb 2025 07:49 )    Color: x / Appearance: x / SG: x / pH: x  Gluc: 91 mg/dL / Ketone: x  / Bili: x / Urobili: x   Blood: x / Protein: x / Nitrite: x   Leuk Esterase: x / RBC: x / WBC x   Sq Epi: x / Non Sq Epi: x / Bacteria: x        Culture - Urine (collected 25 Feb 2025 04:40)  Source: Catheterized Catheterized  Preliminary Report (26 Feb 2025 09:22):    50,000 - 99,000 CFU/mL Gram Negative Rods    Culture - Urine (02.25.25 @ 04:40)   Specimen Source: Catheterized Catheterized  Culture Results:   50,000 - 99,000 CFU/mL Gram Negative Rods    I&O's Detail    26 Feb 2025 07:01  -  27 Feb 2025 07:00  --------------------------------------------------------  IN:  Total IN: 0 mL    OUT:    Voided (mL): 800 mL  Total OUT: 800 mL    Total NET: -800 mL      CAPILLARY BLOOD GLUCOSE        CARDIOLOGY TESTING         EKG: reviewed     ECHO       RADIOLOGY  < from: US Duplex Venous Lower Ext Complete, Bilateral (02.25.25 @ 01:24) >    IMPRESSION:  No evidence of deep venous thrombosis in either lower extremity.    Limited evaluation of the calf veins.    --- End of Report ---      < from: CT Angio Abd Aorta w/run-off w/ IV Cont (02.25.25 @ 00:11) >    FINDINGS:  Tubes, catheters and devices: Gastric banding device present.    Aorta: No aortic aneurysm. No aortic dissection.  Celiac trunk and mesenteric arteries: No occlusion or significant   stenosis.  Renalarteries: No occlusion or significant stenosis.  Right iliac arteries: No occlusion or significant stenosis.  Right femoral/popliteal arteries: No occlusion or significant stenosis.  Right infrapopliteal arteries: The right anterior tibial artery is patent   and can be traced as a patent dorsalis pedis artery well into the foot. The   right peroneal artery is patent and continues as patent distal posterior tibial  artery which can be traced as patent medial and lateral plantar branches well into the foot.  Left iliac arteries: No occlusion or significant stenosis.  Left femoral/popliteal arteries: No occlusion or significant stenosis.  Left infrapopliteal arteries: The left posterior tibial artery is occluded but  is reconstituted at the ankle by the peroneal artery and can be traced as  patent medial and lateral plantar branches well into the foot. The left  anterior tibial artery is patent and can be traced as a patent dorsalis   pedis artery well into the foot. The left peroneal artery is patent and   continues as the patent distal posterior tibial artery at the ankle which can be   traced as patent medial and lateral plantar branches into the foot. The left   posterior tibial artery appears to be either occluded or extremely diminutive but is  joined by the peroneal artery at the ankle to continuous patent medial and  lateral plantar branches into the foot.    Veins: No evidence of DVT. Varicose veins, adjacent soft tissue swelling   and calcifications above the ankles, right more than left.    Liver: No mass.  Gallbladder and biliary ducts: Prior cholecystectomy. No biliary ductal  dilatation.  Pancreas: Unremarkable. No mass. No ductal dilation.  Spleen: Normal. No splenomegaly.  Adrenal glands: Normal. No mass.  Kidneys and ureters: Normal. No mass.  Stomach and bowel: No bowel wall thickening or intestinal obstruction. No  pneumatosis or portal/mesenteric venous gas.  Appendix: No evidence of appendicitis.  Urinary bladder: Unremarkable. No mass.  Reproductive: Hysterectomy.  Intraperitoneal space: No pneumoperitoneum or abscess. Mild low anterior  abdominal wall cellulitis. No soft tissue gas or abscess.  Lymph nodes: No lymphadenopathy.  Bones/joints: Bilateral knee osteoarthritis.  Soft tissues: Diffuse subcutaneous edema both lower extremities, ankles,   and  dorsal feet. No soft tissue gas or abscess.  IMPRESSION:  1.   No pneumoperitoneum or abscess. Mild low anterior abdominal wall  cellulitis. No soft tissue gas or abscess.  2.   Two-vessel runoff to each foot as above.  3.   Diffuse subcutaneous edema both lower extremities, ankles, and dorsal  feet. No soft tissue gas or abscess.  4.   No evidence of DVT.    < end of copied text >

## 2025-02-27 NOTE — PHYSICAL THERAPY INITIAL EVALUATION ADULT - MODALITIES TREATMENT COMMENTS
pt left seated in chair post Eval; chair alarm, bladder suction in place; son present; callbell in reach; pt instructed not to get up alone; call nursing for assist; gunnar well; pain 2/10; RN Ivana made aware pt OOB

## 2025-02-27 NOTE — PROGRESS NOTE ADULT - ASSESSMENT
68 y/o female with a PMHx of hypertension, peripheral vascular disease, chronic lymphedema, sleep apnea, asthma, lap-band surgery, appendectomy, hysterectomy, umbilical hernia repair, and right leg laser vein cleanse and bypass (2008). Presents with "12/10" bilateral leg pain that is a constant, throbbing pain from her foot to groin, with tenderness to touch. Was previously followed by vascular for similar issue but has not been seen for years after last doctor passed away. Pain typically managed well with Ibuprofen meloxicam hot showers, and leg massages. Also reports recent change is smell of urine w/ no dsyuria or hematuria.       #LE pain likely 2/2 PAD vs MSK pain vs cellulitis?  -CT angio of abdominal aorta   -US dopplers negative for DVT  -S/p doxycycline in ED  - ID eval appreciated  - Vascular eval pending  -Multimodal pain therapy - Trial of Lyrica and Robaxin  -PT eval     #Suprapubic tenderness + malodorous urine  - UA negative  - UCx: 50,000 - 99,000 CFU/mL Gram Negative Rods  - Patient not septic, not febrile, WBC normal   - Abdominal CT reviewed: mild low anterior wall cellulitis     #HTN  - States On HCTZ 12.5 at home   - Normotensive continue to monitor for now     #DVT prophylaxis  - C/w Heparin     Fall precautions  Turn q2h  Skin checks as per RN.     #Abnormal Imaging findings  - Left infrapopliteal arteries: The left posterior tibial artery is occluded but is reconstituted at the ankle by the peroneal artery and can be traced as patent medial and lateral plantar branches well into the foot. The left posterior tibial artery appears to be either occluded or extremely diminutive but is joined by the peroneal artery at the ankle to continuous patent medial and lateral plantar branches into the foot.  - Intraperitoneal space: No pneumoperitoneum or abscess. Mild low anterior abdominal wall cellulitis. No soft tissue gas or abscess.   - Soft tissues: Diffuse subcutaneous edema both lower extremities, ankles, and dorsal feet. No soft tissue gas or abscess.
70 y/o female with a PMHx of hypertension, peripheral vascular disease, chronic lymphedema, sleep apnea, asthma, lap-band surgery, appendectomy, hysterectomy, umbilical hernia repair, and right leg laser vein cleanse and bypass (2008). Presents with "12/10" bilateral leg pain that is a constant, throbbing pain from her foot to groin, with tenderness to touch. Was previously followed by vascular for similar issue but has not been seen for years after last doctor passed away. Pain typically managed well with Ibuprofen meloxicam hot showers, and leg massages. Also reports recent change is smell of urine w/ no dsyuria or hematuria.     #Lower extremity pain, likely secondary to PAD  -CT angio of abdominal aorta reviewed  -US dopplers reviewed, negative for DVT  -S/p doxycycline in ED  -Multimodal pain therapy - Trial of Lyrica and Robaxin  -Vascular eval pending  - ID eval appreciated  -PT eval appreciated    #Suprapubic tenderness  -UA: Noted  -Cultures: Noted  -Will treat as patient is symptomatic  - Abdominal CT reviewed: mild low anterior wall cellulitis     #HTN  - States On HCTZ 12.5 at home   - Normotensive continue to monitor for now     #DVT prophylaxis  - C/w Heparin     Fall precautions  Turn q2h  Skin checks as per RN.     #Abnormal Imaging findings  - Left infrapopliteal arteries: The left posterior tibial artery is occluded but is reconstituted at the ankle by the peroneal artery and can be traced as patent medial and lateral plantar branches well into the foot. The left posterior tibial artery appears to be either occluded or extremely diminutive but is joined by the peroneal artery at the ankle to continuous patent medial and lateral plantar branches into the foot.  - Intraperitoneal space: No pneumoperitoneum or abscess. Mild low anterior abdominal wall cellulitis. No soft tissue gas or abscess.   - Soft tissues: Diffuse subcutaneous edema both lower extremities, ankles, and dorsal feet. No soft tissue gas or abscess.

## 2025-02-28 ENCOUNTER — TRANSCRIPTION ENCOUNTER (OUTPATIENT)
Age: 70
End: 2025-02-28

## 2025-02-28 VITALS
SYSTOLIC BLOOD PRESSURE: 139 MMHG | TEMPERATURE: 98 F | DIASTOLIC BLOOD PRESSURE: 67 MMHG | OXYGEN SATURATION: 100 % | HEART RATE: 73 BPM | RESPIRATION RATE: 19 BRPM

## 2025-02-28 LAB
ANION GAP SERPL CALC-SCNC: 6 MMOL/L — SIGNIFICANT CHANGE UP (ref 5–17)
BUN SERPL-MCNC: 10 MG/DL — SIGNIFICANT CHANGE UP (ref 7–23)
CALCIUM SERPL-MCNC: 9.1 MG/DL — SIGNIFICANT CHANGE UP (ref 8.5–10.1)
CHLORIDE SERPL-SCNC: 106 MMOL/L — SIGNIFICANT CHANGE UP (ref 96–108)
CO2 SERPL-SCNC: 25 MMOL/L — SIGNIFICANT CHANGE UP (ref 22–31)
CREAT SERPL-MCNC: 0.58 MG/DL — SIGNIFICANT CHANGE UP (ref 0.5–1.3)
EGFR: 97 ML/MIN/1.73M2 — SIGNIFICANT CHANGE UP
GLUCOSE SERPL-MCNC: 149 MG/DL — HIGH (ref 70–99)
POTASSIUM SERPL-MCNC: 3.7 MMOL/L — SIGNIFICANT CHANGE UP (ref 3.5–5.3)
POTASSIUM SERPL-SCNC: 3.7 MMOL/L — SIGNIFICANT CHANGE UP (ref 3.5–5.3)
SODIUM SERPL-SCNC: 137 MMOL/L — SIGNIFICANT CHANGE UP (ref 135–145)

## 2025-02-28 PROCEDURE — 99239 HOSP IP/OBS DSCHRG MGMT >30: CPT

## 2025-02-28 RX ORDER — HYDROCHLOROTHIAZIDE 50 MG/1
1 TABLET ORAL
Qty: 0 | Refills: 0 | DISCHARGE

## 2025-02-28 RX ADMIN — PREGABALIN 50 MILLIGRAM(S): 50 CAPSULE ORAL at 06:28

## 2025-02-28 RX ADMIN — METHOCARBAMOL 500 MILLIGRAM(S): 500 TABLET, FILM COATED ORAL at 06:28

## 2025-02-28 RX ADMIN — HEPARIN SODIUM 5000 UNIT(S): 1000 INJECTION INTRAVENOUS; SUBCUTANEOUS at 06:28

## 2025-02-28 RX ADMIN — Medication 650 MILLIGRAM(S): at 06:44

## 2025-02-28 RX ADMIN — PREGABALIN 50 MILLIGRAM(S): 50 CAPSULE ORAL at 13:53

## 2025-02-28 RX ADMIN — METHOCARBAMOL 500 MILLIGRAM(S): 500 TABLET, FILM COATED ORAL at 13:53

## 2025-02-28 RX ADMIN — HEPARIN SODIUM 5000 UNIT(S): 1000 INJECTION INTRAVENOUS; SUBCUTANEOUS at 13:53

## 2025-02-28 NOTE — DISCHARGE NOTE PROVIDER - HOSPITAL COURSE
70 y/o female with a PMHx of hypertension, peripheral vascular disease, chronic lymphedema, sleep apnea, asthma, lap-band surgery, appendectomy, hysterectomy, umbilical hernia repair, and vein symptom right leg laser vein cleanse and bypass (2008). She presents with complaints of leg pain. The patient reports a history of vascular disease and chronic lower extremity pain. She has not followed up with a vascular doctor since her last doctor passed away. The patient describes her LE pain as throbbing and "muscle pain," extending from her groin down to her feet. The pain worsens with sitting and walking. When walking, the pain radiates from her foot upwards to her groin. Typically, she manages her pain with meloxicam, ibuprofen, hot showers, and leg massages, which usually provide relief. However, she reports that her pain worsened over the weekend and became unbearable today, rated as "12/10," with no relief from her usual methods. The pain is worse in her left leg compared to her right, reports pain even when touching her skin. The patient also reports a recent change in the smell of her urine but denies fevers, chills, dysuria, frequency, diarrhea, cough, shortness of breath (SOB), chest pain, palpitations, or other acute complaints.    Hospital Course:  Patient was admitted for further workup. Patient was admitted for bilateral lower extremity pain likely secondary to PAD. CT angio abd aorta showed left posterior tibial artery is occluded but is reconstituted at the ankle by the peroneal artery and can be traced as patent medial and lateral plantar branches well into the foot. The left posterior tibial artery appears to be either occluded or extremely diminutive but is joined by the peroneal artery at the ankle to continuous patent medial and lateral plantar branches into the foot, diffuse subcutaneous edema both lower extremities, ankles, and dorsal feet. No soft tissue gas or abscess. As well as mild lower anterior abdominal wall cellulitis, no soft tissue gas or abscess and no evidence of DVT. Was given Doxycyline in the ED and then received Ceftriaxone. Patient received Lasix. Patient was started on trial of Lyrica and Robaxin for multimodal pain therapy as well as Morphine for severe pain PRN and Tylenol for mild pain PRN with symptomatic improvement. A Morse was placed due to ambulation difficulties from pain and has since been removed. Patient was evaluated by ID and PT. At this time patient is medically optimized to be discharged home. Patient will follow up with her PCP, vascular, and PT as outpatient.      Sub: Patient seen and evaluated today at bedside. Reports pain has decreased and has been up twice this AM to walk to the bathroom which she was not able to do previously. Denies chest pain, SOB, and abdominal pain. Has no other acute complaints.     Vital Signs Last 24 Hrs  T(C): 36.7 (28 Feb 2025 07:35), Max: 37 (28 Feb 2025 00:02)  T(F): 98.1 (28 Feb 2025 07:35), Max: 98.6 (28 Feb 2025 00:02)  HR: 72 (28 Feb 2025 07:35) (72 - 81)  BP: 128/66 (28 Feb 2025 07:35) (128/66 - 142/72)  BP(mean): --  RR: 19 (28 Feb 2025 07:35) (17 - 19)  SpO2: 99% (28 Feb 2025 07:35) (95% - 99%)    Parameters below as of 28 Feb 2025 07:35  Patient On (Oxygen Delivery Method): room air    Physical Exam:  GEN: NAD   HEENT: EOMI,  moist mucous membranes  NECK : Soft and supple, no JVD  LUNG: CTABL, No wheezing, rales or rhonchi  CVS: S1S2+, RRR, no M/G/R  GI: BS+, soft, NT/ND, no guarding, no rebound  EXTREMITIES: b/l LE stasis dermatitis, lichenified skin in LE, + b/l LE edema, + mild tenderness to palpation of b/l LE  VASCULAR: 2+ peripheral pulses  NEURO: AAOx3, grossly non-focal   SKIN: No rashes,  b/l LE stasis dermatitis, lichenified skin in LE     Assessment:  70 y/o female with a PMHx of hypertension, peripheral vascular disease, chronic lymphedema, sleep apnea, asthma, lap-band surgery, appendectomy, hysterectomy, umbilical hernia repair, and right leg laser vein cleanse and bypass (2008). Presents with "12/10" bilateral leg pain that is a constant, throbbing pain from her foot to groin, with tenderness to touch. Was previously followed by vascular for similar issue but has not been seen for years after last doctor passed away. Pain typically managed well with Ibuprofen meloxicam hot showers, and leg massages. Also reports recent change is smell of urine, denies dysuria or hematuria.     #Bilateral lower extremity pain, likely secondary to PAD  - CT angio abd/aorta: L posterior tibial artery occluded but reconstituted at ankle well into foot, diffuse subcutaneous edema B/L LE, ankles, dorsal feet, no soft tissue gas or abscess.   - US doppler negative for DVT  - Received Lyrica and Robaxin in addition to pain management.  - Seen and evaluated by ID and PT  - C/w home medications  - C/w supportive care at home  - Home PT  - Follow up with vascular and PCP as outpatient     #HTN  - States On HCTZ 12.5 mg at home   - Normotensive throughout hospital course   - Follow up PCP and cardiologist as outpatient     #Incidental CT finding   - Mild low anterior abdominal wall cellulitis. No soft tissue gas or abscess.  - Follow up PCP outpatient 68 y/o female with a PMHx of hypertension, peripheral vascular disease, chronic lymphedema, sleep apnea, asthma, lap-band surgery, appendectomy, hysterectomy, umbilical hernia repair, and vein symptom right leg laser vein cleanse and bypass (2008). She presents with complaints of leg pain. The patient reports a history of vascular disease and chronic lower extremity pain. She has not followed up with a vascular doctor since her last doctor passed away. The patient describes her LE pain as throbbing and "muscle pain," extending from her groin down to her feet. The pain worsens with sitting and walking. When walking, the pain radiates from her foot upwards to her groin. Typically, she manages her pain with meloxicam, ibuprofen, hot showers, and leg massages, which usually provide relief. However, she reports that her pain worsened over the weekend and became unbearable today, rated as "12/10," with no relief from her usual methods. The pain is worse in her left leg compared to her right, reports pain even when touching her skin. The patient also reports a recent change in the smell of her urine but denies fevers, chills, dysuria, frequency, diarrhea, cough, shortness of breath (SOB), chest pain, palpitations, or other acute complaints.    Hospital Course:  Patient was admitted for further workup. Patient was admitted for bilateral lower extremity pain likely secondary to PAD. CT angio abd aorta showed left posterior tibial artery is occluded but is reconstituted at the ankle by the peroneal artery and can be traced as patent medial and lateral plantar branches well into the foot. The left posterior tibial artery appears to be either occluded or extremely diminutive but is joined by the peroneal artery at the ankle to continuous patent medial and lateral plantar branches into the foot, diffuse subcutaneous edema both lower extremities, ankles, and dorsal feet. No soft tissue gas or abscess. As well as mild lower anterior abdominal wall cellulitis, no soft tissue gas or abscess and no evidence of DVT. Was given Doxycyline in the ED and then received Ceftriaxone. Patient received Lasix. Patient was started on trial of Lyrica and Robaxin for multimodal pain therapy as well as Morphine for severe pain PRN and Tylenol for mild pain PRN with symptomatic improvement. A Morse was placed due to ambulation difficulties from pain and has since been removed. Patient was evaluated by ID and PT. At this time patient is medically optimized to be discharged home. Patient will follow up with her PCP, vascular, and PT as outpatient.      Sub: Patient seen and evaluated today at bedside. Reports pain has decreased and has been up twice this AM to walk to the bathroom which she was not able to do previously. Denies chest pain, SOB, and abdominal pain. Has no other acute complaints.     Vital Signs Last 24 Hrs  T(C): 36.7 (28 Feb 2025 07:35), Max: 37 (28 Feb 2025 00:02)  T(F): 98.1 (28 Feb 2025 07:35), Max: 98.6 (28 Feb 2025 00:02)  HR: 72 (28 Feb 2025 07:35) (72 - 81)  BP: 128/66 (28 Feb 2025 07:35) (128/66 - 142/72)  BP(mean): --  RR: 19 (28 Feb 2025 07:35) (17 - 19)  SpO2: 99% (28 Feb 2025 07:35) (95% - 99%)    Parameters below as of 28 Feb 2025 07:35  Patient On (Oxygen Delivery Method): room air    Physical Exam:  GEN: NAD   HEENT: EOMI,  moist mucous membranes  NECK : Soft and supple, no JVD  LUNG: CTABL, No wheezing, rales or rhonchi  CVS: S1S2+, RRR, no M/G/R  GI: BS+, soft, NT/ND, no guarding, no rebound  EXTREMITIES: b/l LE stasis dermatitis, lichenified skin in LE, + b/l LE edema, + mild tenderness to palpation of b/l LE  VASCULAR: 2+ peripheral pulses  NEURO: AAOx3, grossly non-focal   SKIN: No rashes,  b/l LE stasis dermatitis, lichenified skin in LE     Assessment:  68 y/o female with a PMHx of hypertension, peripheral vascular disease, chronic lymphedema, sleep apnea, asthma, lap-band surgery, appendectomy, hysterectomy, umbilical hernia repair, and right leg laser vein cleanse and bypass (2008). Presents with "12/10" bilateral leg pain that is a constant, throbbing pain from her foot to groin, with tenderness to touch. Was previously followed by vascular for similar issue but has not been seen for years after last doctor passed away. Pain typically managed well with Ibuprofen meloxicam hot showers, and leg massages. Also reports recent change is smell of urine, denies dysuria or hematuria.     #Bilateral lower extremity pain, likely secondary to PAD  - CT angio abd/aorta: L posterior tibial artery occluded but reconstituted at ankle well into foot, diffuse subcutaneous edema B/L LE, ankles, dorsal feet, no soft tissue gas or abscess.   - US doppler negative for DVT  - Received Lyrica and Robaxin in addition to pain management.  - Seen and evaluated by ID and PT  - C/w home medications  - C/w supportive care at home  - Home PT  - Follow up with vascular and PCP as outpatient     #HTN  - States On HCTZ 12.5 mg at home   - Follow up PCP and cardiologist as outpatient     #Incidental CT finding   - Mild low anterior abdominal wall cellulitis. No soft tissue gas or abscess.  - Follow up PCP outpatient

## 2025-02-28 NOTE — DISCHARGE NOTE NURSING/CASE MANAGEMENT/SOCIAL WORK - FINANCIAL ASSISTANCE
[facility] provides services at a reduced cost to those who are determined to be eligible through [facility]’s financial assistance program. Information regarding [facility]’s financial assistance program can be found by going to [email or web address] or by calling [phone].

## 2025-02-28 NOTE — DISCHARGE NOTE PROVIDER - CARE PROVIDER_API CALL
Yen Nicholas  Internal Medicine  [address], [address]  [place] [address]  Phone: [phone]  Fax: [phone]  Established Patient  Follow Up Time: 1 week   Yen Nicholas  Internal Medicine  [address], [address]  [place] [address]  Phone: [phone]  Fax: [phone]  Established Patient  Follow Up Time: 1 week    Melba Bales  Vascular Surgery  [address] [place] [address], [address]  [place] [address]  Phone: [phone]  Fax: [phone]  Follow Up Time: 1 week

## 2025-02-28 NOTE — DISCHARGE NOTE PROVIDER - DETAILS OF MALNUTRITION DIAGNOSIS/DIAGNOSES
This patient has been assessed with a concern for Malnutrition and was treated during this hospitalization for the following Nutrition diagnosis/diagnoses:     -  02/25/2025: Morbid obesity (BMI > 40)

## 2025-02-28 NOTE — DISCHARGE NOTE PROVIDER - NSDCCPCAREPLAN_GEN_ALL_CORE_FT
PRINCIPAL DISCHARGE DIAGNOSIS  Diagnosis: Acute leg pain  Assessment and Plan of Treatment: [facility] to [facility]. During your stay you were treated with IV and oral pain medications. You will be discharged with home physical therapy.   Please follow up with your PCP and a vascular doctor soon after discharge.      SECONDARY DISCHARGE DIAGNOSES  Diagnosis: Abdominal wall cellulitis  Assessment and Plan of Treatment: You were found to have mild lower abdominal wall cellulitis on CT scan.  Please follow up with your PCP as outpatient     PRINCIPAL DISCHARGE DIAGNOSIS  Diagnosis: Acute leg pain  Assessment and Plan of Treatment: You were found to have worsening leg pain in both legs cathy due to peripheral artery disease. During your stay you were treated with IV and oral pain medications. You will be discharged with home physical therapy.   Please follow up with your PCP and a vascular doctor soon after discharge.

## 2025-02-28 NOTE — DISCHARGE NOTE PROVIDER - NSDCCONDITION_GEN_ALL_CORE
Name band;
Stable
Implemented All Universal Safety Interventions:  [place] to call system. Call bell, personal items and telephone within reach. Instruct patient to call for assistance. Room bathroom lighting operational. Non-slip footwear when patient is off stretcher. Physically safe environment: no spills, clutter or unnecessary equipment. Stretcher in lowest position, wheels locked, appropriate side rails in place.

## 2025-02-28 NOTE — DISCHARGE NOTE NURSING/CASE MANAGEMENT/SOCIAL WORK - PATIENT PORTAL LINK FT
You can access the FollowMyHealth Patient Portal offered by [facility] by registering at the following website: [email or web address]. By joining [facility]’s FollowMyHealth portal, you will also be able to view your health information using other applications (apps) compatible with our system.

## 2025-02-28 NOTE — DISCHARGE NOTE NURSING/CASE MANAGEMENT/SOCIAL WORK - NSDCVIVACCINE_GEN_ALL_CORE_FT
influenza, high-dose, quadrivalent; 06-Nov-2022 11:04; Kacie Quintana (RN); [employer]; [id number] (Exp. Date: 06-Nov-2022); IntraMuscular; Deltoid Right.; 0.7 milliLiter(s); VIS (VIS Published: 06-Aug-2021, VIS Presented: 06-Nov-2022);

## 2025-02-28 NOTE — CHART NOTE - NSCHARTNOTEFT_GEN_A_CORE
To whom it may concern:            Pt was hospitalized at [facility] and is excused from work until 03/02/25. Pt can return to work without any restrictions.           Sincerely:

## 2025-02-28 NOTE — DISCHARGE NOTE PROVIDER - CARE PROVIDERS DIRECT ADDRESSES
,DirectAddress_Unknown ,DirectAddress_Unknown,[email or web address]

## 2025-02-28 NOTE — DISCHARGE NOTE NURSING/CASE MANAGEMENT/SOCIAL WORK - NSDCPEFALRISK_GEN_ALL_CORE
For information on Fall & Injury Prevention, visit: [email or web address] OR  [email or web address] OR  https://www.cdc.gov/steadi/patient.html

## 2025-02-28 NOTE — DISCHARGE NOTE PROVIDER - NSDCMRMEDTOKEN_GEN_ALL_CORE_FT
Dyazide 25 mg-37.5 mg oral capsule: 1 cap(s) orally once a day  meloxicam 15 mg oral tablet: 1 tab(s) orally once a day, As Needed for pain   hydroCHLOROthiazide 12.5 mg oral tablet: 1 tab(s) orally once a day  meloxicam 15 mg oral tablet: 1 tab(s) orally once a day, As Needed for pain

## 2025-02-28 NOTE — DISCHARGE NOTE PROVIDER - PROVIDER TOKENS
PROVIDER:[TOKEN:[[id number]],FOLLOWUP:[1 week],ESTABLISHEDPATIENT:[T]] PROVIDER:[TOKEN:[[id number]],FOLLOWUP:[1 week],ESTABLISHEDPATIENT:[T]],PROVIDER:[TOKEN:[[id number]],FOLLOWUP:[1 week]]

## 2025-03-04 DIAGNOSIS — G47.30 SLEEP APNEA, UNSPECIFIED: ICD-10-CM

## 2025-03-04 DIAGNOSIS — Z98.84 BARIATRIC SURGERY STATUS: ICD-10-CM

## 2025-03-04 DIAGNOSIS — L03.311 CELLULITIS OF ABDOMINAL WALL: ICD-10-CM

## 2025-03-04 DIAGNOSIS — I73.9 PERIPHERAL VASCULAR DISEASE, UNSPECIFIED: ICD-10-CM

## 2025-03-04 DIAGNOSIS — I87.2 VENOUS INSUFFICIENCY (CHRONIC) (PERIPHERAL): ICD-10-CM

## 2025-03-04 DIAGNOSIS — J45.909 UNSPECIFIED ASTHMA, UNCOMPLICATED: ICD-10-CM

## 2025-03-04 DIAGNOSIS — I89.0 LYMPHEDEMA, NOT ELSEWHERE CLASSIFIED: ICD-10-CM

## 2025-03-04 DIAGNOSIS — I10 ESSENTIAL (PRIMARY) HYPERTENSION: ICD-10-CM

## 2025-03-04 DIAGNOSIS — E66.01 MORBID (SEVERE) OBESITY DUE TO EXCESS CALORIES: ICD-10-CM

## 2025-03-04 DIAGNOSIS — Z95.820 PERIPHERAL VASCULAR ANGIOPLASTY STATUS WITH IMPLANTS AND GRAFTS: ICD-10-CM

## 2025-03-04 DIAGNOSIS — Z88.6 ALLERGY STATUS TO ANALGESIC AGENT: ICD-10-CM

## 2025-07-17 ENCOUNTER — APPOINTMENT (OUTPATIENT)
Dept: UROLOGY | Facility: CLINIC | Age: 70
End: 2025-07-17
Payer: COMMERCIAL

## 2025-07-17 VITALS
HEIGHT: 64 IN | BODY MASS INDEX: 47.46 KG/M2 | RESPIRATION RATE: 14 BRPM | WEIGHT: 278 LBS | HEART RATE: 74 BPM | DIASTOLIC BLOOD PRESSURE: 84 MMHG | SYSTOLIC BLOOD PRESSURE: 152 MMHG | OXYGEN SATURATION: 100 %

## 2025-07-17 PROCEDURE — 99213 OFFICE O/P EST LOW 20 MIN: CPT

## 2025-08-07 ENCOUNTER — APPOINTMENT (OUTPATIENT)
Dept: UROLOGY | Facility: CLINIC | Age: 70
End: 2025-08-07
Payer: COMMERCIAL

## 2025-08-07 VITALS
HEIGHT: 64 IN | SYSTOLIC BLOOD PRESSURE: 162 MMHG | DIASTOLIC BLOOD PRESSURE: 88 MMHG | RESPIRATION RATE: 14 BRPM | BODY MASS INDEX: 47.46 KG/M2 | TEMPERATURE: 98 F | OXYGEN SATURATION: 98 % | WEIGHT: 278 LBS | HEART RATE: 77 BPM

## 2025-08-07 DIAGNOSIS — R39.15 URGENCY OF URINATION: ICD-10-CM

## 2025-08-07 DIAGNOSIS — N34.3 URETHRAL SYNDROME, UNSPECIFIED: ICD-10-CM

## 2025-08-07 PROCEDURE — 51797 INTRAABDOMINAL PRESSURE TEST: CPT

## 2025-08-07 PROCEDURE — 51784 ANAL/URINARY MUSCLE STUDY: CPT

## 2025-08-07 PROCEDURE — 52285 CYSTOSCOPY AND TREATMENT: CPT

## 2025-08-07 PROCEDURE — 51729 CYSTOMETROGRAM W/VP&UP: CPT

## 2025-08-07 PROCEDURE — 51798 US URINE CAPACITY MEASURE: CPT

## (undated) DEVICE — SOL INJ NS 0.9% 1000ML

## (undated) DEVICE — SOL IRR POUR H2O 1000ML

## (undated) DEVICE — WRAP COMPRESSION CALF MED

## (undated) DEVICE — SUT POLYSORB 0 30" GU-46

## (undated) DEVICE — SUT BIOSYN 4-0 18" P-12

## (undated) DEVICE — TROCAR ETHICON ENDOPATH XCEL BLADELESS 5MM-100MM

## (undated) DEVICE — GLV 8 PROTEXIS

## (undated) DEVICE — PACK GENERAL LAPAROSCOPY

## (undated) DEVICE — TROCAR ETHICON XCEL UNIVERSAL SLEEVE W OPTIVIEW 5MM-100MM

## (undated) DEVICE — DRAPE CHEST BREAST 106X122"

## (undated) DEVICE — PREP BETADINE KIT

## (undated) DEVICE — SOL IRR POUR NS 0.9% 1000ML

## (undated) DEVICE — TUBING STRYKEFLOW II SUCTION / IRRIGATOR

## (undated) DEVICE — DRAIN RESERVOIR FOR JACKSON PRATT 100CC CARDINAL

## (undated) DEVICE — TROCAR ETHICON 11MM XCEL BLADELESS

## (undated) DEVICE — ENDOCATCH 10MM SPECIMEN POUCH

## (undated) DEVICE — BLADE SURGICAL #15 CARBON

## (undated) DEVICE — GLV 7.5 PROTEXIS

## (undated) DEVICE — DRSG STERISTRIPS 0.5X4"

## (undated) DEVICE — TROCAR COVIDIEN VERSAONE BLUNT TIP HASSAN 12MM

## (undated) DEVICE — D HELP - CLEARVIEW CLEARIFY SYSTEM

## (undated) DEVICE — DRAIN JACKSON PRATT 10MM FLAT 3/4 NO TROCAR

## (undated) DEVICE — SPONGE ENDO PEANUT 5MM

## (undated) DEVICE — SUT DERMABOND 0.7ML

## (undated) DEVICE — DRAPE 3/4 SHEET 52X76"

## (undated) DEVICE — BLANKET WARMER UPPER ADULT

## (undated) DEVICE — DRAPE TOWEL BLUE 17" X 24"

## (undated) DEVICE — ENDO SHEARS COVIDIEN 5MM W UNIPOLAR CAUTERY

## (undated) DEVICE — NDL HYPO SAFE 18G X 1.5"

## (undated) DEVICE — DRSG MASTISOL